# Patient Record
Sex: FEMALE | Race: WHITE | NOT HISPANIC OR LATINO | Employment: OTHER
[De-identification: names, ages, dates, MRNs, and addresses within clinical notes are randomized per-mention and may not be internally consistent; named-entity substitution may affect disease eponyms.]

---

## 2019-10-02 ENCOUNTER — HEALTH MAINTENANCE LETTER (OUTPATIENT)
Age: 69
End: 2019-10-02

## 2019-12-15 ENCOUNTER — HEALTH MAINTENANCE LETTER (OUTPATIENT)
Age: 69
End: 2019-12-15

## 2021-01-15 ENCOUNTER — HEALTH MAINTENANCE LETTER (OUTPATIENT)
Age: 71
End: 2021-01-15

## 2021-09-04 ENCOUNTER — HEALTH MAINTENANCE LETTER (OUTPATIENT)
Age: 71
End: 2021-09-04

## 2021-10-30 ENCOUNTER — HEALTH MAINTENANCE LETTER (OUTPATIENT)
Age: 71
End: 2021-10-30

## 2022-01-01 ENCOUNTER — HEALTH MAINTENANCE LETTER (OUTPATIENT)
Age: 72
End: 2022-01-01

## 2022-02-19 ENCOUNTER — HEALTH MAINTENANCE LETTER (OUTPATIENT)
Age: 72
End: 2022-02-19

## 2023-01-01 ENCOUNTER — APPOINTMENT (OUTPATIENT)
Dept: GENERAL RADIOLOGY | Facility: CLINIC | Age: 73
DRG: 870 | End: 2023-01-01
Attending: INTERNAL MEDICINE
Payer: MEDICARE

## 2023-01-01 ENCOUNTER — APPOINTMENT (OUTPATIENT)
Dept: CARDIOLOGY | Facility: CLINIC | Age: 73
DRG: 870 | End: 2023-01-01
Attending: INTERNAL MEDICINE
Payer: MEDICARE

## 2023-01-01 ENCOUNTER — HOSPITAL ENCOUNTER (EMERGENCY)
Facility: CLINIC | Age: 73
Discharge: HOME | End: 2023-05-17
Attending: EMERGENCY MEDICINE | Admitting: EMERGENCY MEDICINE
Payer: MEDICARE

## 2023-01-01 ENCOUNTER — ANESTHESIA (OUTPATIENT)
Dept: INTENSIVE CARE | Facility: CLINIC | Age: 73
DRG: 870 | End: 2023-01-01
Payer: MEDICARE

## 2023-01-01 ENCOUNTER — APPOINTMENT (OUTPATIENT)
Dept: CT IMAGING | Facility: CLINIC | Age: 73
DRG: 870 | End: 2023-01-01
Attending: INTERNAL MEDICINE
Payer: MEDICARE

## 2023-01-01 ENCOUNTER — HOSPITAL ENCOUNTER (EMERGENCY)
Facility: CLINIC | Age: 73
Discharge: HOME | DRG: 870 | End: 2023-05-21
Attending: EMERGENCY MEDICINE | Admitting: EMERGENCY MEDICINE
Payer: MEDICARE

## 2023-01-01 ENCOUNTER — APPOINTMENT (OUTPATIENT)
Dept: GENERAL RADIOLOGY | Facility: CLINIC | Age: 73
DRG: 870 | End: 2023-01-01
Attending: ANESTHESIOLOGY
Payer: MEDICARE

## 2023-01-01 ENCOUNTER — APPOINTMENT (OUTPATIENT)
Dept: CT IMAGING | Facility: CLINIC | Age: 73
DRG: 870 | End: 2023-01-01
Attending: EMERGENCY MEDICINE
Payer: MEDICARE

## 2023-01-01 ENCOUNTER — APPOINTMENT (OUTPATIENT)
Dept: GENERAL RADIOLOGY | Facility: CLINIC | Age: 73
DRG: 870 | End: 2023-01-01
Attending: EMERGENCY MEDICINE
Payer: MEDICARE

## 2023-01-01 ENCOUNTER — ANESTHESIA EVENT (OUTPATIENT)
Dept: INTENSIVE CARE | Facility: CLINIC | Age: 73
DRG: 870 | End: 2023-01-01
Payer: MEDICARE

## 2023-01-01 ENCOUNTER — HEALTH MAINTENANCE LETTER (OUTPATIENT)
Age: 73
End: 2023-01-01

## 2023-01-01 ENCOUNTER — APPOINTMENT (OUTPATIENT)
Dept: ULTRASOUND IMAGING | Facility: CLINIC | Age: 73
DRG: 870 | End: 2023-01-01
Attending: EMERGENCY MEDICINE
Payer: MEDICARE

## 2023-01-01 ENCOUNTER — HOSPITAL ENCOUNTER (INPATIENT)
Facility: CLINIC | Age: 73
LOS: 13 days | DRG: 870 | End: 2023-06-04
Attending: EMERGENCY MEDICINE | Admitting: INTERNAL MEDICINE
Payer: MEDICARE

## 2023-01-01 ENCOUNTER — APPOINTMENT (OUTPATIENT)
Dept: MRI IMAGING | Facility: CLINIC | Age: 73
DRG: 870 | End: 2023-01-01
Attending: INTERNAL MEDICINE
Payer: MEDICARE

## 2023-01-01 VITALS
BODY MASS INDEX: 30.02 KG/M2 | WEIGHT: 159 LBS | OXYGEN SATURATION: 100 % | TEMPERATURE: 99.6 F | HEIGHT: 61 IN | RESPIRATION RATE: 19 BRPM | DIASTOLIC BLOOD PRESSURE: 42 MMHG | HEART RATE: 77 BPM | SYSTOLIC BLOOD PRESSURE: 92 MMHG

## 2023-01-01 VITALS
OXYGEN SATURATION: 98 % | WEIGHT: 150 LBS | HEART RATE: 69 BPM | TEMPERATURE: 97.1 F | DIASTOLIC BLOOD PRESSURE: 52 MMHG | RESPIRATION RATE: 20 BRPM | SYSTOLIC BLOOD PRESSURE: 99 MMHG | BODY MASS INDEX: 27.89 KG/M2

## 2023-01-01 VITALS
TEMPERATURE: 94.9 F | WEIGHT: 171.08 LBS | DIASTOLIC BLOOD PRESSURE: 45 MMHG | BODY MASS INDEX: 32.32 KG/M2 | SYSTOLIC BLOOD PRESSURE: 102 MMHG | OXYGEN SATURATION: 89 %

## 2023-01-01 DIAGNOSIS — R78.81 GRAM-NEGATIVE BACTEREMIA: ICD-10-CM

## 2023-01-01 DIAGNOSIS — N17.9 ACUTE RENAL FAILURE SUPERIMPOSED ON CHRONIC KIDNEY DISEASE, UNSPECIFIED CKD STAGE, UNSPECIFIED ACUTE RENAL FAILURE TYPE: ICD-10-CM

## 2023-01-01 DIAGNOSIS — L29.9 ITCHING OF BOTH HANDS: ICD-10-CM

## 2023-01-01 DIAGNOSIS — R10.13 ABDOMINAL PAIN, EPIGASTRIC: ICD-10-CM

## 2023-01-01 DIAGNOSIS — M62.81 GENERALIZED MUSCLE WEAKNESS: ICD-10-CM

## 2023-01-01 DIAGNOSIS — D69.6 THROMBOCYTOPENIA (H): ICD-10-CM

## 2023-01-01 DIAGNOSIS — K74.3 PRIMARY BILIARY CIRRHOSIS (H): ICD-10-CM

## 2023-01-01 DIAGNOSIS — I95.9 HYPOTENSION, UNSPECIFIED HYPOTENSION TYPE: ICD-10-CM

## 2023-01-01 DIAGNOSIS — N18.9 ACUTE RENAL FAILURE SUPERIMPOSED ON CHRONIC KIDNEY DISEASE, UNSPECIFIED CKD STAGE, UNSPECIFIED ACUTE RENAL FAILURE TYPE: ICD-10-CM

## 2023-01-01 DIAGNOSIS — A41.9 SEPTIC SHOCK (H): ICD-10-CM

## 2023-01-01 DIAGNOSIS — R65.21 SEPTIC SHOCK (H): ICD-10-CM

## 2023-01-01 DIAGNOSIS — E16.2 HYPOGLYCEMIA: ICD-10-CM

## 2023-01-01 LAB
ABO/RH(D): NORMAL
ABO/RH(D): NORMAL
ABSOLUTE NEUTROPHILS, BODY FLUID: 1757 /UL
ABSOLUTE NEUTROPHILS, BODY FLUID: 96 /UL
ACINETOBACTER SPECIES: NOT DETECTED
ALBUMIN BODY FLUID SOURCE: NORMAL
ALBUMIN FLD-MCNC: 0.3 G/DL
ALBUMIN SERPL BCG-MCNC: 2.3 G/DL (ref 3.5–5.2)
ALBUMIN SERPL BCG-MCNC: 2.6 G/DL (ref 3.5–5.2)
ALBUMIN SERPL BCG-MCNC: 2.7 G/DL (ref 3.5–5.2)
ALBUMIN SERPL BCG-MCNC: 2.8 G/DL (ref 3.5–5.2)
ALBUMIN SERPL BCG-MCNC: 2.9 G/DL (ref 3.5–5.2)
ALBUMIN SERPL BCG-MCNC: 3 G/DL (ref 3.5–5.2)
ALBUMIN SERPL BCG-MCNC: 3 G/DL (ref 3.5–5.2)
ALBUMIN SERPL BCG-MCNC: 3.1 G/DL (ref 3.5–5.2)
ALBUMIN SERPL BCG-MCNC: 3.1 G/DL (ref 3.5–5.2)
ALBUMIN SERPL BCG-MCNC: 3.2 G/DL (ref 3.5–5.2)
ALBUMIN SERPL BCG-MCNC: 3.3 G/DL (ref 3.5–5.2)
ALBUMIN SERPL BCG-MCNC: 3.4 G/DL (ref 3.5–5.2)
ALBUMIN SERPL BCG-MCNC: 3.5 G/DL (ref 3.5–5.2)
ALBUMIN SERPL BCG-MCNC: 3.5 G/DL (ref 3.5–5.2)
ALBUMIN SERPL BCG-MCNC: 3.6 G/DL (ref 3.5–5.2)
ALBUMIN SERPL BCG-MCNC: 3.7 G/DL (ref 3.5–5.2)
ALBUMIN SERPL BCG-MCNC: 3.7 G/DL (ref 3.5–5.2)
ALBUMIN SERPL BCG-MCNC: 3.9 G/DL (ref 3.5–5.2)
ALBUMIN SERPL BCG-MCNC: 4 G/DL (ref 3.5–5.2)
ALBUMIN UR-MCNC: 30 MG/DL
ALLEN'S TEST: ABNORMAL
ALLEN'S TEST: YES
ALP SERPL-CCNC: 100 U/L (ref 35–104)
ALP SERPL-CCNC: 109 U/L (ref 35–104)
ALP SERPL-CCNC: 125 U/L (ref 35–104)
ALP SERPL-CCNC: 126 U/L (ref 35–104)
ALP SERPL-CCNC: 128 U/L (ref 35–104)
ALP SERPL-CCNC: 137 U/L (ref 35–104)
ALP SERPL-CCNC: 138 U/L (ref 35–104)
ALP SERPL-CCNC: 141 U/L (ref 35–104)
ALP SERPL-CCNC: 155 U/L (ref 35–104)
ALP SERPL-CCNC: 160 U/L (ref 35–104)
ALP SERPL-CCNC: 177 U/L (ref 35–104)
ALP SERPL-CCNC: 179 U/L (ref 35–104)
ALP SERPL-CCNC: 181 U/L (ref 35–104)
ALP SERPL-CCNC: 183 U/L (ref 35–104)
ALP SERPL-CCNC: 194 U/L (ref 35–104)
ALP SERPL-CCNC: 221 U/L (ref 35–104)
ALP SERPL-CCNC: 268 U/L (ref 35–104)
ALP SERPL-CCNC: 294 U/L (ref 35–104)
ALP SERPL-CCNC: 326 U/L (ref 35–104)
ALT SERPL W P-5'-P-CCNC: 30 U/L (ref 10–35)
ALT SERPL W P-5'-P-CCNC: 30 U/L (ref 10–35)
ALT SERPL W P-5'-P-CCNC: 31 U/L (ref 10–35)
ALT SERPL W P-5'-P-CCNC: 34 U/L (ref 10–35)
ALT SERPL W P-5'-P-CCNC: 36 U/L (ref 10–35)
ALT SERPL W P-5'-P-CCNC: 38 U/L (ref 10–35)
ALT SERPL W P-5'-P-CCNC: 39 U/L (ref 10–35)
ALT SERPL W P-5'-P-CCNC: 40 U/L (ref 10–35)
ALT SERPL W P-5'-P-CCNC: 43 U/L (ref 10–35)
ALT SERPL W P-5'-P-CCNC: 44 U/L (ref 10–35)
ALT SERPL W P-5'-P-CCNC: 44 U/L (ref 10–35)
ALT SERPL W P-5'-P-CCNC: 45 U/L (ref 10–35)
ALT SERPL W P-5'-P-CCNC: 46 U/L (ref 10–35)
ALT SERPL W P-5'-P-CCNC: 46 U/L (ref 10–35)
ALT SERPL W P-5'-P-CCNC: 48 U/L (ref 10–35)
ALT SERPL W P-5'-P-CCNC: 53 U/L (ref 10–35)
ALT SERPL W P-5'-P-CCNC: 74 U/L (ref 10–35)
ALT SERPL W P-5'-P-CCNC: 79 U/L (ref 10–35)
ALT SERPL W P-5'-P-CCNC: 81 U/L (ref 10–35)
AMMONIA PLAS-SCNC: 46 UMOL/L (ref 11–51)
ANION GAP SERPL CALCULATED.3IONS-SCNC: 10 MMOL/L (ref 7–15)
ANION GAP SERPL CALCULATED.3IONS-SCNC: 10 MMOL/L (ref 7–15)
ANION GAP SERPL CALCULATED.3IONS-SCNC: 11 MMOL/L (ref 7–15)
ANION GAP SERPL CALCULATED.3IONS-SCNC: 12 MMOL/L (ref 7–15)
ANION GAP SERPL CALCULATED.3IONS-SCNC: 13 MMOL/L (ref 7–15)
ANION GAP SERPL CALCULATED.3IONS-SCNC: 13 MMOL/L (ref 7–15)
ANION GAP SERPL CALCULATED.3IONS-SCNC: 14 MMOL/L (ref 7–15)
ANION GAP SERPL CALCULATED.3IONS-SCNC: 15 MMOL/L (ref 7–15)
ANION GAP SERPL CALCULATED.3IONS-SCNC: 15 MMOL/L (ref 7–15)
ANION GAP SERPL CALCULATED.3IONS-SCNC: 16 MMOL/L (ref 7–15)
ANION GAP SERPL CALCULATED.3IONS-SCNC: 17 MMOL/L (ref 7–15)
ANION GAP SERPL CALCULATED.3IONS-SCNC: 18 MMOL/L (ref 7–15)
ANION GAP SERPL CALCULATED.3IONS-SCNC: 20 MMOL/L (ref 7–15)
ANION GAP SERPL CALCULATED.3IONS-SCNC: 21 MMOL/L (ref 7–15)
ANION GAP SERPL CALCULATED.3IONS-SCNC: 22 MMOL/L (ref 7–15)
ANION GAP SERPL CALCULATED.3IONS-SCNC: 23 MMOL/L (ref 7–15)
ANION GAP SERPL CALCULATED.3IONS-SCNC: 24 MMOL/L (ref 7–15)
ANION GAP SERPL CALCULATED.3IONS-SCNC: 24 MMOL/L (ref 7–15)
ANION GAP SERPL CALCULATED.3IONS-SCNC: 25 MMOL/L (ref 7–15)
ANION GAP SERPL CALCULATED.3IONS-SCNC: 25 MMOL/L (ref 7–15)
ANION GAP SERPL CALCULATED.3IONS-SCNC: 26 MMOL/L (ref 7–15)
ANION GAP SERPL CALCULATED.3IONS-SCNC: 26 MMOL/L (ref 7–15)
ANION GAP SERPL CALCULATED.3IONS-SCNC: 27 MMOL/L (ref 7–15)
ANTIBODY SCREEN: NEGATIVE
ANTIBODY SCREEN: NEGATIVE
APPEARANCE FLD: ABNORMAL
APPEARANCE FLD: ABNORMAL
APPEARANCE UR: ABNORMAL
APTT PPP: 41 SECONDS (ref 22–38)
APTT PPP: 42 SECONDS (ref 22–38)
APTT PPP: 43 SECONDS (ref 22–38)
APTT PPP: 46 SECONDS (ref 22–38)
APTT PPP: 48 SECONDS (ref 22–38)
APTT PPP: 49 SECONDS (ref 22–38)
APTT PPP: 50 SECONDS (ref 22–38)
APTT PPP: 51 SECONDS (ref 22–38)
APTT PPP: 53 SECONDS (ref 22–38)
APTT PPP: 58 SECONDS (ref 22–38)
APTT PPP: 59 SECONDS (ref 22–38)
APTT PPP: 60 SECONDS (ref 22–38)
APTT PPP: 61 SECONDS (ref 22–38)
APTT PPP: 63 SECONDS (ref 22–38)
APTT PPP: 68 SECONDS (ref 22–38)
APTT PPP: 75 SECONDS (ref 22–38)
APTT PPP: 78 SECONDS (ref 22–38)
APTT PPP: 79 SECONDS (ref 22–38)
AST SERPL W P-5'-P-CCNC: 113 U/L (ref 10–35)
AST SERPL W P-5'-P-CCNC: 120 U/L (ref 10–35)
AST SERPL W P-5'-P-CCNC: 42 U/L (ref 10–35)
AST SERPL W P-5'-P-CCNC: 43 U/L (ref 10–35)
AST SERPL W P-5'-P-CCNC: 45 U/L (ref 10–35)
AST SERPL W P-5'-P-CCNC: 47 U/L (ref 10–35)
AST SERPL W P-5'-P-CCNC: 49 U/L (ref 10–35)
AST SERPL W P-5'-P-CCNC: 57 U/L (ref 10–35)
AST SERPL W P-5'-P-CCNC: 58 U/L (ref 10–35)
AST SERPL W P-5'-P-CCNC: 60 U/L (ref 10–35)
AST SERPL W P-5'-P-CCNC: 61 U/L (ref 10–35)
AST SERPL W P-5'-P-CCNC: 61 U/L (ref 10–35)
AST SERPL W P-5'-P-CCNC: 62 U/L (ref 10–35)
AST SERPL W P-5'-P-CCNC: 63 U/L (ref 10–35)
AST SERPL W P-5'-P-CCNC: 65 U/L (ref 10–35)
AST SERPL W P-5'-P-CCNC: 68 U/L (ref 10–35)
AST SERPL W P-5'-P-CCNC: 69 U/L (ref 10–35)
AST SERPL W P-5'-P-CCNC: 71 U/L (ref 10–35)
AST SERPL W P-5'-P-CCNC: 98 U/L (ref 10–35)
ATRIAL RATE - MUSE: 69 BPM
ATRIAL RATE - MUSE: 77 BPM
ATRIAL RATE - MUSE: 82 BPM
BACTERIA BLD CULT: ABNORMAL
BACTERIA BLD CULT: NO GROWTH
BACTERIA BRONCH: ABNORMAL
BACTERIA FLD CULT: ABNORMAL
BACTERIA FLD CULT: ABNORMAL
BASE EXCESS BLDA CALC-SCNC: -0.5 MMOL/L (ref -9–1.8)
BASE EXCESS BLDA CALC-SCNC: -10.6 MMOL/L (ref -9–1.8)
BASE EXCESS BLDA CALC-SCNC: -10.6 MMOL/L (ref -9–1.8)
BASE EXCESS BLDA CALC-SCNC: -10.7 MMOL/L (ref -9–1.8)
BASE EXCESS BLDA CALC-SCNC: -11.2 MMOL/L (ref -9–1.8)
BASE EXCESS BLDA CALC-SCNC: -12.9 MMOL/L (ref -9–1.8)
BASE EXCESS BLDA CALC-SCNC: -13.4 MMOL/L (ref -9–1.8)
BASE EXCESS BLDA CALC-SCNC: -14 MMOL/L (ref -9–1.8)
BASE EXCESS BLDA CALC-SCNC: -16.3 MMOL/L (ref -9–1.8)
BASE EXCESS BLDA CALC-SCNC: -18.5 MMOL/L (ref -9–1.8)
BASE EXCESS BLDA CALC-SCNC: -2 MMOL/L (ref -9–1.8)
BASE EXCESS BLDA CALC-SCNC: -2.3 MMOL/L (ref -9–1.8)
BASE EXCESS BLDA CALC-SCNC: -5.6 MMOL/L (ref -9–1.8)
BASE EXCESS BLDA CALC-SCNC: -8.1 MMOL/L (ref -9–1.8)
BASE EXCESS BLDA CALC-SCNC: -8.6 MMOL/L (ref -9–1.8)
BASE EXCESS BLDA CALC-SCNC: -8.8 MMOL/L (ref -9–1.8)
BASE EXCESS BLDA CALC-SCNC: -9.1 MMOL/L (ref -9–1.8)
BASE EXCESS BLDA CALC-SCNC: 0 MMOL/L (ref -9–1.8)
BASE EXCESS BLDA CALC-SCNC: 0.6 MMOL/L (ref -9–1.8)
BASE EXCESS BLDA CALC-SCNC: 0.7 MMOL/L (ref -9–1.8)
BASE EXCESS BLDA CALC-SCNC: 0.8 MMOL/L (ref -9–1.8)
BASE EXCESS BLDA CALC-SCNC: 1.5 MMOL/L (ref -9–1.8)
BASE EXCESS BLDA CALC-SCNC: 2.2 MMOL/L (ref -9–1.8)
BASE EXCESS BLDA CALC-SCNC: 3 MMOL/L (ref -9–1.8)
BASE EXCESS BLDA CALC-SCNC: 3 MMOL/L (ref -9–1.8)
BASE EXCESS BLDA CALC-SCNC: 3.6 MMOL/L (ref -9–1.8)
BASE EXCESS BLDA CALC-SCNC: 4.8 MMOL/L (ref -9–1.8)
BASE EXCESS BLDA CALC-SCNC: 6 MMOL/L (ref -9–1.8)
BASE EXCESS BLDA CALC-SCNC: 6.1 MMOL/L (ref -9–1.8)
BASE EXCESS BLDA CALC-SCNC: 6.3 MMOL/L (ref -9–1.8)
BASOPHILS # BLD AUTO: 0.1 10E3/UL (ref 0–0.2)
BASOPHILS # BLD AUTO: 0.1 10E3/UL (ref 0–0.2)
BASOPHILS # BLD MANUAL: 0 10E3/UL (ref 0–0.2)
BASOPHILS NFR BLD AUTO: 0 %
BASOPHILS NFR BLD AUTO: 0 %
BASOPHILS NFR BLD MANUAL: 0 %
BILIRUB DIRECT SERPL-MCNC: 2.93 MG/DL (ref 0–0.3)
BILIRUB DIRECT SERPL-MCNC: 2.94 MG/DL (ref 0–0.3)
BILIRUB DIRECT SERPL-MCNC: 3.3 MG/DL (ref 0–0.3)
BILIRUB DIRECT SERPL-MCNC: 3.35 MG/DL (ref 0–0.3)
BILIRUB DIRECT SERPL-MCNC: 3.76 MG/DL (ref 0–0.3)
BILIRUB DIRECT SERPL-MCNC: 3.93 MG/DL (ref 0–0.3)
BILIRUB DIRECT SERPL-MCNC: 5.29 MG/DL (ref 0–0.3)
BILIRUB DIRECT SERPL-MCNC: 6.89 MG/DL (ref 0–0.3)
BILIRUB SERPL-MCNC: 11.6 MG/DL
BILIRUB SERPL-MCNC: 11.9 MG/DL
BILIRUB SERPL-MCNC: 13.7 MG/DL
BILIRUB SERPL-MCNC: 13.9 MG/DL
BILIRUB SERPL-MCNC: 16.4 MG/DL
BILIRUB SERPL-MCNC: 16.4 MG/DL
BILIRUB SERPL-MCNC: 4.3 MG/DL
BILIRUB SERPL-MCNC: 4.6 MG/DL
BILIRUB SERPL-MCNC: 4.7 MG/DL
BILIRUB SERPL-MCNC: 5 MG/DL
BILIRUB SERPL-MCNC: 5.3 MG/DL
BILIRUB SERPL-MCNC: 5.3 MG/DL
BILIRUB SERPL-MCNC: 5.9 MG/DL
BILIRUB SERPL-MCNC: 6.2 MG/DL
BILIRUB SERPL-MCNC: 7 MG/DL
BILIRUB SERPL-MCNC: 7.7 MG/DL
BILIRUB SERPL-MCNC: 9.8 MG/DL
BILIRUB UR QL STRIP: ABNORMAL
BLD PROD TYP BPU: NORMAL
BLOOD COMPONENT TYPE: NORMAL
BUN SERPL-MCNC: 14.6 MG/DL (ref 8–23)
BUN SERPL-MCNC: 16.4 MG/DL (ref 8–23)
BUN SERPL-MCNC: 16.6 MG/DL (ref 8–23)
BUN SERPL-MCNC: 16.9 MG/DL (ref 8–23)
BUN SERPL-MCNC: 17 MG/DL (ref 8–23)
BUN SERPL-MCNC: 17.1 MG/DL (ref 8–23)
BUN SERPL-MCNC: 17.1 MG/DL (ref 8–23)
BUN SERPL-MCNC: 17.3 MG/DL (ref 8–23)
BUN SERPL-MCNC: 17.7 MG/DL (ref 8–23)
BUN SERPL-MCNC: 17.8 MG/DL (ref 8–23)
BUN SERPL-MCNC: 18 MG/DL (ref 8–23)
BUN SERPL-MCNC: 19 MG/DL (ref 8–23)
BUN SERPL-MCNC: 19.4 MG/DL (ref 8–23)
BUN SERPL-MCNC: 20.1 MG/DL (ref 8–23)
BUN SERPL-MCNC: 20.9 MG/DL (ref 8–23)
BUN SERPL-MCNC: 21 MG/DL (ref 8–23)
BUN SERPL-MCNC: 21.8 MG/DL (ref 8–23)
BUN SERPL-MCNC: 22.1 MG/DL (ref 8–23)
BUN SERPL-MCNC: 23.3 MG/DL (ref 8–23)
BUN SERPL-MCNC: 24.5 MG/DL (ref 8–23)
BUN SERPL-MCNC: 24.5 MG/DL (ref 8–23)
BUN SERPL-MCNC: 24.7 MG/DL (ref 8–23)
BUN SERPL-MCNC: 24.7 MG/DL (ref 8–23)
BUN SERPL-MCNC: 25.1 MG/DL (ref 8–23)
BUN SERPL-MCNC: 27.1 MG/DL (ref 8–23)
BUN SERPL-MCNC: 27.7 MG/DL (ref 8–23)
BUN SERPL-MCNC: 27.7 MG/DL (ref 8–23)
BUN SERPL-MCNC: 28.9 MG/DL (ref 8–23)
BUN SERPL-MCNC: 29 MG/DL (ref 8–23)
BUN SERPL-MCNC: 30.1 MG/DL (ref 8–23)
BUN SERPL-MCNC: 31.3 MG/DL (ref 8–23)
BUN SERPL-MCNC: 31.7 MG/DL (ref 8–23)
BUN SERPL-MCNC: 33.6 MG/DL (ref 8–23)
BUN SERPL-MCNC: 33.8 MG/DL (ref 8–23)
BUN SERPL-MCNC: 35.4 MG/DL (ref 8–23)
BUN SERPL-MCNC: 35.4 MG/DL (ref 8–23)
BUN SERPL-MCNC: 35.9 MG/DL (ref 8–23)
BUN SERPL-MCNC: 35.9 MG/DL (ref 8–23)
BUN SERPL-MCNC: 36.6 MG/DL (ref 8–23)
BUN SERPL-MCNC: 37.6 MG/DL (ref 8–23)
BUN SERPL-MCNC: 39 MG/DL (ref 8–23)
BUN SERPL-MCNC: 41.9 MG/DL (ref 8–23)
BUN SERPL-MCNC: 46.9 MG/DL (ref 8–23)
BUN SERPL-MCNC: 50.7 MG/DL (ref 8–23)
BUN SERPL-MCNC: 54.9 MG/DL (ref 8–23)
BUN SERPL-MCNC: 56.3 MG/DL (ref 8–23)
BUN SERPL-MCNC: 56.4 MG/DL (ref 8–23)
BUN SERPL-MCNC: 56.6 MG/DL (ref 8–23)
BURR CELLS BLD QL SMEAR: SLIGHT
C DIFF TOX B STL QL: NEGATIVE
CA-I BLD-MCNC: 4 MG/DL (ref 4.4–5.2)
CA-I BLD-MCNC: 4.1 MG/DL (ref 4.4–5.2)
CA-I BLD-MCNC: 4.2 MG/DL (ref 4.4–5.2)
CA-I BLD-MCNC: 4.3 MG/DL (ref 4.4–5.2)
CA-I BLD-MCNC: 4.4 MG/DL (ref 4.4–5.2)
CA-I BLD-MCNC: 4.5 MG/DL (ref 4.4–5.2)
CA-I BLD-MCNC: 4.6 MG/DL (ref 4.4–5.2)
CA-I BLD-MCNC: 4.7 MG/DL (ref 4.4–5.2)
CA-I BLD-MCNC: 4.8 MG/DL (ref 4.4–5.2)
CA-I BLD-MCNC: 5.1 MG/DL (ref 4.4–5.2)
CALCIUM SERPL-MCNC: 7.6 MG/DL (ref 8.8–10.2)
CALCIUM SERPL-MCNC: 7.7 MG/DL (ref 8.8–10.2)
CALCIUM SERPL-MCNC: 7.9 MG/DL (ref 8.8–10.2)
CALCIUM SERPL-MCNC: 8 MG/DL (ref 8.8–10.2)
CALCIUM SERPL-MCNC: 8.1 MG/DL (ref 8.8–10.2)
CALCIUM SERPL-MCNC: 8.1 MG/DL (ref 8.8–10.2)
CALCIUM SERPL-MCNC: 8.2 MG/DL (ref 8.8–10.2)
CALCIUM SERPL-MCNC: 8.3 MG/DL (ref 8.8–10.2)
CALCIUM SERPL-MCNC: 8.4 MG/DL (ref 8.8–10.2)
CALCIUM SERPL-MCNC: 8.6 MG/DL (ref 8.8–10.2)
CALCIUM SERPL-MCNC: 8.7 MG/DL (ref 8.8–10.2)
CALCIUM SERPL-MCNC: 8.7 MG/DL (ref 8.8–10.2)
CALCIUM SERPL-MCNC: 8.8 MG/DL (ref 8.8–10.2)
CALCIUM SERPL-MCNC: 8.8 MG/DL (ref 8.8–10.2)
CALCIUM SERPL-MCNC: 8.9 MG/DL (ref 8.8–10.2)
CALCIUM SERPL-MCNC: 8.9 MG/DL (ref 8.8–10.2)
CALCIUM SERPL-MCNC: 9 MG/DL (ref 8.8–10.2)
CALCIUM SERPL-MCNC: 9 MG/DL (ref 8.8–10.2)
CALCIUM SERPL-MCNC: 9.2 MG/DL (ref 8.8–10.2)
CALCIUM SERPL-MCNC: 9.2 MG/DL (ref 8.8–10.2)
CALCIUM SERPL-MCNC: 9.3 MG/DL (ref 8.8–10.2)
CALCIUM SERPL-MCNC: 9.3 MG/DL (ref 8.8–10.2)
CALCIUM SERPL-MCNC: 9.4 MG/DL (ref 8.8–10.2)
CALCIUM SERPL-MCNC: 9.4 MG/DL (ref 8.8–10.2)
CALCIUM SERPL-MCNC: 9.5 MG/DL (ref 8.8–10.2)
CALCIUM SERPL-MCNC: 9.5 MG/DL (ref 8.8–10.2)
CALCIUM SERPL-MCNC: 9.7 MG/DL (ref 8.8–10.2)
CELL COUNT BODY FLUID SOURCE: ABNORMAL
CELL COUNT BODY FLUID SOURCE: ABNORMAL
CHLORIDE SERPL-SCNC: 100 MMOL/L (ref 98–107)
CHLORIDE SERPL-SCNC: 101 MMOL/L (ref 98–107)
CHLORIDE SERPL-SCNC: 102 MMOL/L (ref 98–107)
CHLORIDE SERPL-SCNC: 103 MMOL/L (ref 98–107)
CHLORIDE SERPL-SCNC: 104 MMOL/L (ref 98–107)
CHLORIDE SERPL-SCNC: 105 MMOL/L (ref 98–107)
CHLORIDE SERPL-SCNC: 105 MMOL/L (ref 98–107)
CHLORIDE SERPL-SCNC: 93 MMOL/L (ref 98–107)
CHLORIDE SERPL-SCNC: 94 MMOL/L (ref 98–107)
CHLORIDE SERPL-SCNC: 94 MMOL/L (ref 98–107)
CHLORIDE SERPL-SCNC: 95 MMOL/L (ref 98–107)
CHLORIDE SERPL-SCNC: 95 MMOL/L (ref 98–107)
CHLORIDE SERPL-SCNC: 99 MMOL/L (ref 98–107)
CITROBACTER SPECIES: NOT DETECTED
CMV DNA SPEC NAA+PROBE-ACNC: NOT DETECTED IU/ML
CODING SYSTEM: NORMAL
COLOR FLD: ABNORMAL
COLOR FLD: YELLOW
COLOR UR AUTO: ABNORMAL
CORTIS SERPL-MCNC: 68.7 UG/DL
CREAT SERPL-MCNC: 0.54 MG/DL (ref 0.51–0.95)
CREAT SERPL-MCNC: 0.6 MG/DL (ref 0.51–0.95)
CREAT SERPL-MCNC: 0.63 MG/DL (ref 0.51–0.95)
CREAT SERPL-MCNC: 0.66 MG/DL (ref 0.51–0.95)
CREAT SERPL-MCNC: 0.75 MG/DL (ref 0.51–0.95)
CREAT SERPL-MCNC: 0.75 MG/DL (ref 0.51–0.95)
CREAT SERPL-MCNC: 0.87 MG/DL (ref 0.51–0.95)
CREAT SERPL-MCNC: 0.88 MG/DL (ref 0.51–0.95)
CREAT SERPL-MCNC: 0.89 MG/DL (ref 0.51–0.95)
CREAT SERPL-MCNC: 0.9 MG/DL (ref 0.51–0.95)
CREAT SERPL-MCNC: 0.91 MG/DL (ref 0.51–0.95)
CREAT SERPL-MCNC: 0.92 MG/DL (ref 0.51–0.95)
CREAT SERPL-MCNC: 0.93 MG/DL (ref 0.51–0.95)
CREAT SERPL-MCNC: 0.94 MG/DL (ref 0.51–0.95)
CREAT SERPL-MCNC: 0.95 MG/DL (ref 0.51–0.95)
CREAT SERPL-MCNC: 0.96 MG/DL (ref 0.51–0.95)
CREAT SERPL-MCNC: 0.97 MG/DL (ref 0.51–0.95)
CREAT SERPL-MCNC: 1.02 MG/DL (ref 0.51–0.95)
CREAT SERPL-MCNC: 1.06 MG/DL (ref 0.51–0.95)
CREAT SERPL-MCNC: 1.1 MG/DL (ref 0.51–0.95)
CREAT SERPL-MCNC: 1.12 MG/DL (ref 0.51–0.95)
CREAT SERPL-MCNC: 1.13 MG/DL (ref 0.51–0.95)
CREAT SERPL-MCNC: 1.21 MG/DL (ref 0.51–0.95)
CREAT SERPL-MCNC: 1.32 MG/DL (ref 0.51–0.95)
CREAT SERPL-MCNC: 1.46 MG/DL (ref 0.51–0.95)
CREAT SERPL-MCNC: 1.53 MG/DL (ref 0.51–0.95)
CREAT SERPL-MCNC: 1.68 MG/DL (ref 0.51–0.95)
CREAT SERPL-MCNC: 1.74 MG/DL (ref 0.51–0.95)
CREAT SERPL-MCNC: 2.03 MG/DL (ref 0.51–0.95)
CREAT SERPL-MCNC: 2.21 MG/DL (ref 0.51–0.95)
CREAT SERPL-MCNC: 2.42 MG/DL (ref 0.51–0.95)
CREAT SERPL-MCNC: 2.5 MG/DL (ref 0.51–0.95)
CREAT SERPL-MCNC: 2.58 MG/DL (ref 0.51–0.95)
CREAT SERPL-MCNC: 2.82 MG/DL (ref 0.51–0.95)
CREAT SERPL-MCNC: 3.04 MG/DL (ref 0.51–0.95)
CROSSMATCH: NORMAL
CTX-M: NOT DETECTED
DAT POLY: NORMAL
DEPRECATED HCO3 PLAS-SCNC: 11 MMOL/L (ref 22–29)
DEPRECATED HCO3 PLAS-SCNC: 12 MMOL/L (ref 22–29)
DEPRECATED HCO3 PLAS-SCNC: 12 MMOL/L (ref 22–29)
DEPRECATED HCO3 PLAS-SCNC: 13 MMOL/L (ref 22–29)
DEPRECATED HCO3 PLAS-SCNC: 14 MMOL/L (ref 22–29)
DEPRECATED HCO3 PLAS-SCNC: 14 MMOL/L (ref 22–29)
DEPRECATED HCO3 PLAS-SCNC: 15 MMOL/L (ref 22–29)
DEPRECATED HCO3 PLAS-SCNC: 16 MMOL/L (ref 22–29)
DEPRECATED HCO3 PLAS-SCNC: 16 MMOL/L (ref 22–29)
DEPRECATED HCO3 PLAS-SCNC: 17 MMOL/L (ref 22–29)
DEPRECATED HCO3 PLAS-SCNC: 17 MMOL/L (ref 22–29)
DEPRECATED HCO3 PLAS-SCNC: 18 MMOL/L (ref 22–29)
DEPRECATED HCO3 PLAS-SCNC: 19 MMOL/L (ref 22–29)
DEPRECATED HCO3 PLAS-SCNC: 20 MMOL/L (ref 22–29)
DEPRECATED HCO3 PLAS-SCNC: 21 MMOL/L (ref 22–29)
DEPRECATED HCO3 PLAS-SCNC: 22 MMOL/L (ref 22–29)
DEPRECATED HCO3 PLAS-SCNC: 23 MMOL/L (ref 22–29)
DEPRECATED HCO3 PLAS-SCNC: 24 MMOL/L (ref 22–29)
DEPRECATED HCO3 PLAS-SCNC: 24 MMOL/L (ref 22–29)
DEPRECATED HCO3 PLAS-SCNC: 25 MMOL/L (ref 22–29)
DEPRECATED HCO3 PLAS-SCNC: 26 MMOL/L (ref 22–29)
DEPRECATED HCO3 PLAS-SCNC: 27 MMOL/L (ref 22–29)
DEPRECATED HCO3 PLAS-SCNC: 27 MMOL/L (ref 22–29)
DIASTOLIC BLOOD PRESSURE - MUSE: NORMAL MMHG
ENTEROBACTER SPECIES: NOT DETECTED
EOSINOPHIL # BLD AUTO: 0 10E3/UL (ref 0–0.7)
EOSINOPHIL # BLD AUTO: 0.2 10E3/UL (ref 0–0.7)
EOSINOPHIL # BLD MANUAL: 0 10E3/UL (ref 0–0.7)
EOSINOPHIL # BLD MANUAL: 0.2 10E3/UL (ref 0–0.7)
EOSINOPHIL NFR BLD AUTO: 0 %
EOSINOPHIL NFR BLD AUTO: 1 %
EOSINOPHIL NFR BLD MANUAL: 0 %
EOSINOPHIL NFR BLD MANUAL: 1 %
ERYTHROCYTE [DISTWIDTH] IN BLOOD BY AUTOMATED COUNT: 17.1 % (ref 10–15)
ERYTHROCYTE [DISTWIDTH] IN BLOOD BY AUTOMATED COUNT: 17.6 % (ref 10–15)
ERYTHROCYTE [DISTWIDTH] IN BLOOD BY AUTOMATED COUNT: 17.7 % (ref 10–15)
ERYTHROCYTE [DISTWIDTH] IN BLOOD BY AUTOMATED COUNT: 17.9 % (ref 10–15)
ERYTHROCYTE [DISTWIDTH] IN BLOOD BY AUTOMATED COUNT: 17.9 % (ref 10–15)
ERYTHROCYTE [DISTWIDTH] IN BLOOD BY AUTOMATED COUNT: 18 % (ref 10–15)
ERYTHROCYTE [DISTWIDTH] IN BLOOD BY AUTOMATED COUNT: 18.1 % (ref 10–15)
ERYTHROCYTE [DISTWIDTH] IN BLOOD BY AUTOMATED COUNT: 18.1 % (ref 10–15)
ERYTHROCYTE [DISTWIDTH] IN BLOOD BY AUTOMATED COUNT: 18.3 % (ref 10–15)
ERYTHROCYTE [DISTWIDTH] IN BLOOD BY AUTOMATED COUNT: 18.3 % (ref 10–15)
ERYTHROCYTE [DISTWIDTH] IN BLOOD BY AUTOMATED COUNT: 18.4 % (ref 10–15)
ERYTHROCYTE [DISTWIDTH] IN BLOOD BY AUTOMATED COUNT: 18.5 % (ref 10–15)
ERYTHROCYTE [DISTWIDTH] IN BLOOD BY AUTOMATED COUNT: 18.8 % (ref 10–15)
ERYTHROCYTE [DISTWIDTH] IN BLOOD BY AUTOMATED COUNT: 18.9 % (ref 10–15)
ERYTHROCYTE [DISTWIDTH] IN BLOOD BY AUTOMATED COUNT: 19 % (ref 10–15)
ERYTHROCYTE [DISTWIDTH] IN BLOOD BY AUTOMATED COUNT: 19 % (ref 10–15)
ERYTHROCYTE [DISTWIDTH] IN BLOOD BY AUTOMATED COUNT: 19.1 % (ref 10–15)
ERYTHROCYTE [DISTWIDTH] IN BLOOD BY AUTOMATED COUNT: 19.1 % (ref 10–15)
ERYTHROCYTE [DISTWIDTH] IN BLOOD BY AUTOMATED COUNT: 19.2 % (ref 10–15)
ERYTHROCYTE [DISTWIDTH] IN BLOOD BY AUTOMATED COUNT: 19.3 % (ref 10–15)
ERYTHROCYTE [DISTWIDTH] IN BLOOD BY AUTOMATED COUNT: 19.4 % (ref 10–15)
ERYTHROCYTE [DISTWIDTH] IN BLOOD BY AUTOMATED COUNT: 19.4 % (ref 10–15)
ERYTHROCYTE [DISTWIDTH] IN BLOOD BY AUTOMATED COUNT: 19.5 % (ref 10–15)
ERYTHROCYTE [DISTWIDTH] IN BLOOD BY AUTOMATED COUNT: 20 % (ref 10–15)
ERYTHROCYTE [DISTWIDTH] IN BLOOD BY AUTOMATED COUNT: 20.7 % (ref 10–15)
ERYTHROCYTE [DISTWIDTH] IN BLOOD BY AUTOMATED COUNT: 20.8 % (ref 10–15)
ERYTHROCYTE [DISTWIDTH] IN BLOOD BY AUTOMATED COUNT: 21.5 % (ref 10–15)
ERYTHROCYTE [DISTWIDTH] IN BLOOD BY AUTOMATED COUNT: 22.2 % (ref 10–15)
ERYTHROCYTE [DISTWIDTH] IN BLOOD BY AUTOMATED COUNT: 22.5 % (ref 10–15)
ERYTHROCYTE [DISTWIDTH] IN BLOOD BY AUTOMATED COUNT: 23.2 % (ref 10–15)
ERYTHROCYTE [DISTWIDTH] IN BLOOD BY AUTOMATED COUNT: 23.8 % (ref 10–15)
ERYTHROCYTE [DISTWIDTH] IN BLOOD BY AUTOMATED COUNT: 24.3 % (ref 10–15)
ERYTHROCYTE [DISTWIDTH] IN BLOOD BY AUTOMATED COUNT: 25 % (ref 10–15)
ERYTHROCYTE [DISTWIDTH] IN BLOOD BY AUTOMATED COUNT: 25.3 % (ref 10–15)
ERYTHROCYTE [DISTWIDTH] IN BLOOD BY AUTOMATED COUNT: 25.4 % (ref 10–15)
ERYTHROCYTE [DISTWIDTH] IN BLOOD BY AUTOMATED COUNT: 26.6 % (ref 10–15)
ERYTHROCYTE [DISTWIDTH] IN BLOOD BY AUTOMATED COUNT: ABNORMAL %
ESCHERICHIA COLI: DETECTED
FIBRINOGEN PPP-MCNC: 101 MG/DL (ref 170–490)
FIBRINOGEN PPP-MCNC: 106 MG/DL (ref 170–490)
FIBRINOGEN PPP-MCNC: 108 MG/DL (ref 170–490)
FIBRINOGEN PPP-MCNC: 108 MG/DL (ref 170–490)
FIBRINOGEN PPP-MCNC: 110 MG/DL (ref 170–490)
FIBRINOGEN PPP-MCNC: 112 MG/DL (ref 170–490)
FIBRINOGEN PPP-MCNC: 118 MG/DL (ref 170–490)
FIBRINOGEN PPP-MCNC: 127 MG/DL (ref 170–490)
FIBRINOGEN PPP-MCNC: 130 MG/DL (ref 170–490)
FIBRINOGEN PPP-MCNC: 134 MG/DL (ref 170–490)
FIBRINOGEN PPP-MCNC: 137 MG/DL (ref 170–490)
FIBRINOGEN PPP-MCNC: 138 MG/DL (ref 170–490)
FIBRINOGEN PPP-MCNC: 146 MG/DL (ref 170–490)
FIBRINOGEN PPP-MCNC: 147 MG/DL (ref 170–490)
FIBRINOGEN PPP-MCNC: 147 MG/DL (ref 170–490)
FIBRINOGEN PPP-MCNC: 149 MG/DL (ref 170–490)
FIBRINOGEN PPP-MCNC: 71 MG/DL (ref 170–490)
FIBRINOGEN PPP-MCNC: 72 MG/DL (ref 170–490)
FIBRINOGEN PPP-MCNC: 85 MG/DL (ref 170–490)
FIBRINOGEN PPP-MCNC: 88 MG/DL (ref 170–490)
FIBRINOGEN PPP-MCNC: 93 MG/DL (ref 170–490)
FIBRINOGEN PPP-MCNC: 93 MG/DL (ref 170–490)
FIBRINOGEN PPP-MCNC: 94 MG/DL (ref 170–490)
FIBRINOGEN PPP-MCNC: 98 MG/DL (ref 170–490)
FIBRINOGEN PPP-MCNC: 98 MG/DL (ref 170–490)
FIBRINOGEN PPP-MCNC: <61 MG/DL (ref 170–490)
FLUAV RNA SPEC QL NAA+PROBE: NEGATIVE
FLUBV RNA RESP QL NAA+PROBE: NEGATIVE
GFR SERPL CREATININE-BSD FRML MDRD: 15 ML/MIN/1.73M2
GFR SERPL CREATININE-BSD FRML MDRD: 17 ML/MIN/1.73M2
GFR SERPL CREATININE-BSD FRML MDRD: 19 ML/MIN/1.73M2
GFR SERPL CREATININE-BSD FRML MDRD: 19 ML/MIN/1.73M2
GFR SERPL CREATININE-BSD FRML MDRD: 20 ML/MIN/1.73M2
GFR SERPL CREATININE-BSD FRML MDRD: 23 ML/MIN/1.73M2
GFR SERPL CREATININE-BSD FRML MDRD: 25 ML/MIN/1.73M2
GFR SERPL CREATININE-BSD FRML MDRD: 30 ML/MIN/1.73M2
GFR SERPL CREATININE-BSD FRML MDRD: 31 ML/MIN/1.73M2
GFR SERPL CREATININE-BSD FRML MDRD: 35 ML/MIN/1.73M2
GFR SERPL CREATININE-BSD FRML MDRD: 37 ML/MIN/1.73M2
GFR SERPL CREATININE-BSD FRML MDRD: 42 ML/MIN/1.73M2
GFR SERPL CREATININE-BSD FRML MDRD: 47 ML/MIN/1.73M2
GFR SERPL CREATININE-BSD FRML MDRD: 50 ML/MIN/1.73M2
GFR SERPL CREATININE-BSD FRML MDRD: 51 ML/MIN/1.73M2
GFR SERPL CREATININE-BSD FRML MDRD: 52 ML/MIN/1.73M2
GFR SERPL CREATININE-BSD FRML MDRD: 55 ML/MIN/1.73M2
GFR SERPL CREATININE-BSD FRML MDRD: 57 ML/MIN/1.73M2
GFR SERPL CREATININE-BSD FRML MDRD: 61 ML/MIN/1.73M2
GFR SERPL CREATININE-BSD FRML MDRD: 61 ML/MIN/1.73M2
GFR SERPL CREATININE-BSD FRML MDRD: 62 ML/MIN/1.73M2
GFR SERPL CREATININE-BSD FRML MDRD: 63 ML/MIN/1.73M2
GFR SERPL CREATININE-BSD FRML MDRD: 64 ML/MIN/1.73M2
GFR SERPL CREATININE-BSD FRML MDRD: 65 ML/MIN/1.73M2
GFR SERPL CREATININE-BSD FRML MDRD: 66 ML/MIN/1.73M2
GFR SERPL CREATININE-BSD FRML MDRD: 67 ML/MIN/1.73M2
GFR SERPL CREATININE-BSD FRML MDRD: 68 ML/MIN/1.73M2
GFR SERPL CREATININE-BSD FRML MDRD: 69 ML/MIN/1.73M2
GFR SERPL CREATININE-BSD FRML MDRD: 83 ML/MIN/1.73M2
GFR SERPL CREATININE-BSD FRML MDRD: 83 ML/MIN/1.73M2
GFR SERPL CREATININE-BSD FRML MDRD: >90 ML/MIN/1.73M2
GLUCOSE BLDC GLUCOMTR-MCNC: 105 MG/DL (ref 70–99)
GLUCOSE BLDC GLUCOMTR-MCNC: 106 MG/DL (ref 70–99)
GLUCOSE BLDC GLUCOMTR-MCNC: 106 MG/DL (ref 70–99)
GLUCOSE BLDC GLUCOMTR-MCNC: 107 MG/DL (ref 70–99)
GLUCOSE BLDC GLUCOMTR-MCNC: 109 MG/DL (ref 70–99)
GLUCOSE BLDC GLUCOMTR-MCNC: 110 MG/DL (ref 70–99)
GLUCOSE BLDC GLUCOMTR-MCNC: 111 MG/DL (ref 70–99)
GLUCOSE BLDC GLUCOMTR-MCNC: 112 MG/DL (ref 70–99)
GLUCOSE BLDC GLUCOMTR-MCNC: 112 MG/DL (ref 70–99)
GLUCOSE BLDC GLUCOMTR-MCNC: 113 MG/DL (ref 70–99)
GLUCOSE BLDC GLUCOMTR-MCNC: 113 MG/DL (ref 70–99)
GLUCOSE BLDC GLUCOMTR-MCNC: 116 MG/DL (ref 70–99)
GLUCOSE BLDC GLUCOMTR-MCNC: 117 MG/DL (ref 70–99)
GLUCOSE BLDC GLUCOMTR-MCNC: 117 MG/DL (ref 70–99)
GLUCOSE BLDC GLUCOMTR-MCNC: 118 MG/DL (ref 70–99)
GLUCOSE BLDC GLUCOMTR-MCNC: 119 MG/DL (ref 70–99)
GLUCOSE BLDC GLUCOMTR-MCNC: 120 MG/DL (ref 70–99)
GLUCOSE BLDC GLUCOMTR-MCNC: 120 MG/DL (ref 70–99)
GLUCOSE BLDC GLUCOMTR-MCNC: 125 MG/DL (ref 70–99)
GLUCOSE BLDC GLUCOMTR-MCNC: 127 MG/DL (ref 70–99)
GLUCOSE BLDC GLUCOMTR-MCNC: 128 MG/DL (ref 70–99)
GLUCOSE BLDC GLUCOMTR-MCNC: 130 MG/DL (ref 70–99)
GLUCOSE BLDC GLUCOMTR-MCNC: 132 MG/DL (ref 70–99)
GLUCOSE BLDC GLUCOMTR-MCNC: 132 MG/DL (ref 70–99)
GLUCOSE BLDC GLUCOMTR-MCNC: 139 MG/DL (ref 70–99)
GLUCOSE BLDC GLUCOMTR-MCNC: 142 MG/DL (ref 70–99)
GLUCOSE BLDC GLUCOMTR-MCNC: 143 MG/DL (ref 70–99)
GLUCOSE BLDC GLUCOMTR-MCNC: 143 MG/DL (ref 70–99)
GLUCOSE BLDC GLUCOMTR-MCNC: 144 MG/DL (ref 70–99)
GLUCOSE BLDC GLUCOMTR-MCNC: 145 MG/DL (ref 70–99)
GLUCOSE BLDC GLUCOMTR-MCNC: 146 MG/DL (ref 70–99)
GLUCOSE BLDC GLUCOMTR-MCNC: 146 MG/DL (ref 70–99)
GLUCOSE BLDC GLUCOMTR-MCNC: 148 MG/DL (ref 70–99)
GLUCOSE BLDC GLUCOMTR-MCNC: 149 MG/DL (ref 70–99)
GLUCOSE BLDC GLUCOMTR-MCNC: 151 MG/DL (ref 70–99)
GLUCOSE BLDC GLUCOMTR-MCNC: 151 MG/DL (ref 70–99)
GLUCOSE BLDC GLUCOMTR-MCNC: 154 MG/DL (ref 70–99)
GLUCOSE BLDC GLUCOMTR-MCNC: 155 MG/DL (ref 70–99)
GLUCOSE BLDC GLUCOMTR-MCNC: 155 MG/DL (ref 70–99)
GLUCOSE BLDC GLUCOMTR-MCNC: 157 MG/DL (ref 70–99)
GLUCOSE BLDC GLUCOMTR-MCNC: 159 MG/DL (ref 70–99)
GLUCOSE BLDC GLUCOMTR-MCNC: 160 MG/DL (ref 70–99)
GLUCOSE BLDC GLUCOMTR-MCNC: 161 MG/DL (ref 70–99)
GLUCOSE BLDC GLUCOMTR-MCNC: 161 MG/DL (ref 70–99)
GLUCOSE BLDC GLUCOMTR-MCNC: 162 MG/DL (ref 70–99)
GLUCOSE BLDC GLUCOMTR-MCNC: 163 MG/DL (ref 70–99)
GLUCOSE BLDC GLUCOMTR-MCNC: 164 MG/DL (ref 70–99)
GLUCOSE BLDC GLUCOMTR-MCNC: 168 MG/DL (ref 70–99)
GLUCOSE BLDC GLUCOMTR-MCNC: 169 MG/DL (ref 70–99)
GLUCOSE BLDC GLUCOMTR-MCNC: 172 MG/DL (ref 70–99)
GLUCOSE BLDC GLUCOMTR-MCNC: 173 MG/DL (ref 70–99)
GLUCOSE BLDC GLUCOMTR-MCNC: 173 MG/DL (ref 70–99)
GLUCOSE BLDC GLUCOMTR-MCNC: 182 MG/DL (ref 70–99)
GLUCOSE BLDC GLUCOMTR-MCNC: 20 MG/DL (ref 70–99)
GLUCOSE BLDC GLUCOMTR-MCNC: 46 MG/DL (ref 70–99)
GLUCOSE BLDC GLUCOMTR-MCNC: 57 MG/DL (ref 70–99)
GLUCOSE BLDC GLUCOMTR-MCNC: 62 MG/DL (ref 70–99)
GLUCOSE BLDC GLUCOMTR-MCNC: 64 MG/DL (ref 70–99)
GLUCOSE BLDC GLUCOMTR-MCNC: 67 MG/DL (ref 70–99)
GLUCOSE BLDC GLUCOMTR-MCNC: 74 MG/DL (ref 70–99)
GLUCOSE BLDC GLUCOMTR-MCNC: 79 MG/DL (ref 70–99)
GLUCOSE BLDC GLUCOMTR-MCNC: 80 MG/DL (ref 70–99)
GLUCOSE BLDC GLUCOMTR-MCNC: 81 MG/DL (ref 70–99)
GLUCOSE BLDC GLUCOMTR-MCNC: 82 MG/DL (ref 70–99)
GLUCOSE BLDC GLUCOMTR-MCNC: 83 MG/DL (ref 70–99)
GLUCOSE BLDC GLUCOMTR-MCNC: 83 MG/DL (ref 70–99)
GLUCOSE BLDC GLUCOMTR-MCNC: 84 MG/DL (ref 70–99)
GLUCOSE BLDC GLUCOMTR-MCNC: 88 MG/DL (ref 70–99)
GLUCOSE BLDC GLUCOMTR-MCNC: 90 MG/DL (ref 70–99)
GLUCOSE BLDC GLUCOMTR-MCNC: 90 MG/DL (ref 70–99)
GLUCOSE BLDC GLUCOMTR-MCNC: 92 MG/DL (ref 70–99)
GLUCOSE BLDC GLUCOMTR-MCNC: 93 MG/DL (ref 70–99)
GLUCOSE BLDC GLUCOMTR-MCNC: 94 MG/DL (ref 70–99)
GLUCOSE BLDC GLUCOMTR-MCNC: 95 MG/DL (ref 70–99)
GLUCOSE BLDC GLUCOMTR-MCNC: 96 MG/DL (ref 70–99)
GLUCOSE BLDC GLUCOMTR-MCNC: 96 MG/DL (ref 70–99)
GLUCOSE BLDC GLUCOMTR-MCNC: 97 MG/DL (ref 70–99)
GLUCOSE BLDC GLUCOMTR-MCNC: 97 MG/DL (ref 70–99)
GLUCOSE BLDC GLUCOMTR-MCNC: 98 MG/DL (ref 70–99)
GLUCOSE BLDC GLUCOMTR-MCNC: 99 MG/DL (ref 70–99)
GLUCOSE SERPL-MCNC: 103 MG/DL (ref 70–99)
GLUCOSE SERPL-MCNC: 104 MG/DL (ref 70–99)
GLUCOSE SERPL-MCNC: 106 MG/DL (ref 70–99)
GLUCOSE SERPL-MCNC: 108 MG/DL (ref 70–99)
GLUCOSE SERPL-MCNC: 109 MG/DL (ref 70–99)
GLUCOSE SERPL-MCNC: 109 MG/DL (ref 70–99)
GLUCOSE SERPL-MCNC: 112 MG/DL (ref 70–99)
GLUCOSE SERPL-MCNC: 116 MG/DL (ref 70–99)
GLUCOSE SERPL-MCNC: 117 MG/DL (ref 70–99)
GLUCOSE SERPL-MCNC: 120 MG/DL (ref 70–99)
GLUCOSE SERPL-MCNC: 123 MG/DL (ref 70–99)
GLUCOSE SERPL-MCNC: 126 MG/DL (ref 70–99)
GLUCOSE SERPL-MCNC: 128 MG/DL (ref 70–99)
GLUCOSE SERPL-MCNC: 129 MG/DL (ref 70–99)
GLUCOSE SERPL-MCNC: 133 MG/DL (ref 70–99)
GLUCOSE SERPL-MCNC: 136 MG/DL (ref 70–99)
GLUCOSE SERPL-MCNC: 139 MG/DL (ref 70–99)
GLUCOSE SERPL-MCNC: 149 MG/DL (ref 70–99)
GLUCOSE SERPL-MCNC: 159 MG/DL (ref 70–99)
GLUCOSE SERPL-MCNC: 162 MG/DL (ref 70–99)
GLUCOSE SERPL-MCNC: 163 MG/DL (ref 70–99)
GLUCOSE SERPL-MCNC: 164 MG/DL (ref 70–99)
GLUCOSE SERPL-MCNC: 168 MG/DL (ref 70–99)
GLUCOSE SERPL-MCNC: 168 MG/DL (ref 70–99)
GLUCOSE SERPL-MCNC: 169 MG/DL (ref 70–99)
GLUCOSE SERPL-MCNC: 172 MG/DL (ref 70–99)
GLUCOSE SERPL-MCNC: 173 MG/DL (ref 70–99)
GLUCOSE SERPL-MCNC: 176 MG/DL (ref 70–99)
GLUCOSE SERPL-MCNC: 176 MG/DL (ref 70–99)
GLUCOSE SERPL-MCNC: 177 MG/DL (ref 70–99)
GLUCOSE SERPL-MCNC: 181 MG/DL (ref 70–99)
GLUCOSE SERPL-MCNC: 188 MG/DL (ref 70–99)
GLUCOSE SERPL-MCNC: 188 MG/DL (ref 70–99)
GLUCOSE SERPL-MCNC: 192 MG/DL (ref 70–99)
GLUCOSE SERPL-MCNC: 40 MG/DL (ref 70–99)
GLUCOSE SERPL-MCNC: 47 MG/DL (ref 70–99)
GLUCOSE SERPL-MCNC: 79 MG/DL (ref 70–99)
GLUCOSE SERPL-MCNC: 87 MG/DL (ref 70–99)
GLUCOSE SERPL-MCNC: 89 MG/DL (ref 70–99)
GLUCOSE SERPL-MCNC: 89 MG/DL (ref 70–99)
GLUCOSE SERPL-MCNC: 93 MG/DL (ref 70–99)
GLUCOSE SERPL-MCNC: 94 MG/DL (ref 70–99)
GLUCOSE SERPL-MCNC: 97 MG/DL (ref 70–99)
GLUCOSE UR STRIP-MCNC: NEGATIVE MG/DL
GRAM STAIN RESULT: NORMAL
GRANULAR CAST: 10 /LPF
HAPTOGLOB SERPL-MCNC: 4 MG/DL (ref 32–197)
HCO3 BLD-SCNC: 12 MMOL/L (ref 21–28)
HCO3 BLD-SCNC: 13 MMOL/L (ref 21–28)
HCO3 BLD-SCNC: 13 MMOL/L (ref 21–28)
HCO3 BLD-SCNC: 14 MMOL/L (ref 21–28)
HCO3 BLD-SCNC: 14 MMOL/L (ref 21–28)
HCO3 BLD-SCNC: 15 MMOL/L (ref 21–28)
HCO3 BLD-SCNC: 15 MMOL/L (ref 21–28)
HCO3 BLD-SCNC: 16 MMOL/L (ref 21–28)
HCO3 BLD-SCNC: 16 MMOL/L (ref 21–28)
HCO3 BLD-SCNC: 17 MMOL/L (ref 21–28)
HCO3 BLD-SCNC: 19 MMOL/L (ref 21–28)
HCO3 BLD-SCNC: 20 MMOL/L (ref 21–28)
HCO3 BLD-SCNC: 21 MMOL/L (ref 21–28)
HCO3 BLD-SCNC: 24 MMOL/L (ref 21–28)
HCO3 BLD-SCNC: 25 MMOL/L (ref 21–28)
HCO3 BLD-SCNC: 25 MMOL/L (ref 21–28)
HCO3 BLD-SCNC: 26 MMOL/L (ref 21–28)
HCO3 BLD-SCNC: 27 MMOL/L (ref 21–28)
HCO3 BLD-SCNC: 29 MMOL/L (ref 21–28)
HCO3 BLD-SCNC: 30 MMOL/L (ref 21–28)
HCO3 BLDV-SCNC: 17 MMOL/L (ref 21–28)
HCT VFR BLD AUTO: 19.4 % (ref 35–47)
HCT VFR BLD AUTO: 20 % (ref 35–47)
HCT VFR BLD AUTO: 21.1 % (ref 35–47)
HCT VFR BLD AUTO: 21.5 % (ref 35–47)
HCT VFR BLD AUTO: 21.7 % (ref 35–47)
HCT VFR BLD AUTO: 22 % (ref 35–47)
HCT VFR BLD AUTO: 22.2 % (ref 35–47)
HCT VFR BLD AUTO: 22.4 % (ref 35–47)
HCT VFR BLD AUTO: 22.4 % (ref 35–47)
HCT VFR BLD AUTO: 22.6 % (ref 35–47)
HCT VFR BLD AUTO: 23 % (ref 35–47)
HCT VFR BLD AUTO: 23.1 % (ref 35–47)
HCT VFR BLD AUTO: 23.2 % (ref 35–47)
HCT VFR BLD AUTO: 23.2 % (ref 35–47)
HCT VFR BLD AUTO: 23.3 % (ref 35–47)
HCT VFR BLD AUTO: 23.3 % (ref 35–47)
HCT VFR BLD AUTO: 23.5 % (ref 35–47)
HCT VFR BLD AUTO: 23.5 % (ref 35–47)
HCT VFR BLD AUTO: 23.6 % (ref 35–47)
HCT VFR BLD AUTO: 23.8 % (ref 35–47)
HCT VFR BLD AUTO: 24 % (ref 35–47)
HCT VFR BLD AUTO: 24.1 % (ref 35–47)
HCT VFR BLD AUTO: 24.1 % (ref 35–47)
HCT VFR BLD AUTO: 24.4 % (ref 35–47)
HCT VFR BLD AUTO: 24.5 % (ref 35–47)
HCT VFR BLD AUTO: 24.6 % (ref 35–47)
HCT VFR BLD AUTO: 24.6 % (ref 35–47)
HCT VFR BLD AUTO: 24.8 % (ref 35–47)
HCT VFR BLD AUTO: 25.1 % (ref 35–47)
HCT VFR BLD AUTO: 25.4 % (ref 35–47)
HCT VFR BLD AUTO: 25.6 % (ref 35–47)
HCT VFR BLD AUTO: 25.6 % (ref 35–47)
HCT VFR BLD AUTO: 25.8 % (ref 35–47)
HCT VFR BLD AUTO: 26 % (ref 35–47)
HCT VFR BLD AUTO: 26.9 % (ref 35–47)
HCT VFR BLD AUTO: 27.4 % (ref 35–47)
HCT VFR BLD AUTO: 27.6 % (ref 35–47)
HCT VFR BLD AUTO: 28.4 % (ref 35–47)
HCT VFR BLD AUTO: 30.3 % (ref 35–47)
HCT VFR BLD AUTO: 30.8 % (ref 35–47)
HCT VFR BLD AUTO: 31.3 % (ref 35–47)
HGB BLD-MCNC: 10.2 G/DL (ref 11.7–15.7)
HGB BLD-MCNC: 10.3 G/DL (ref 11.7–15.7)
HGB BLD-MCNC: 10.6 G/DL (ref 11.7–15.7)
HGB BLD-MCNC: 6.4 G/DL (ref 11.7–15.7)
HGB BLD-MCNC: 6.7 G/DL (ref 11.7–15.7)
HGB BLD-MCNC: 6.9 G/DL (ref 11.7–15.7)
HGB BLD-MCNC: 7.1 G/DL (ref 11.7–15.7)
HGB BLD-MCNC: 7.1 G/DL (ref 11.7–15.7)
HGB BLD-MCNC: 7.2 G/DL (ref 11.7–15.7)
HGB BLD-MCNC: 7.2 G/DL (ref 11.7–15.7)
HGB BLD-MCNC: 7.4 G/DL (ref 11.7–15.7)
HGB BLD-MCNC: 7.5 G/DL (ref 11.7–15.7)
HGB BLD-MCNC: 7.5 G/DL (ref 11.7–15.7)
HGB BLD-MCNC: 7.6 G/DL (ref 11.7–15.7)
HGB BLD-MCNC: 7.7 G/DL (ref 11.7–15.7)
HGB BLD-MCNC: 7.8 G/DL (ref 11.7–15.7)
HGB BLD-MCNC: 7.9 G/DL (ref 11.7–15.7)
HGB BLD-MCNC: 8 G/DL (ref 11.7–15.7)
HGB BLD-MCNC: 8 G/DL (ref 11.7–15.7)
HGB BLD-MCNC: 8.1 G/DL (ref 11.7–15.7)
HGB BLD-MCNC: 8.2 G/DL (ref 11.7–15.7)
HGB BLD-MCNC: 8.3 G/DL (ref 11.7–15.7)
HGB BLD-MCNC: 8.4 G/DL (ref 11.7–15.7)
HGB BLD-MCNC: 8.4 G/DL (ref 11.7–15.7)
HGB BLD-MCNC: 8.5 G/DL (ref 11.7–15.7)
HGB BLD-MCNC: 8.6 G/DL (ref 11.7–15.7)
HGB BLD-MCNC: 8.7 G/DL (ref 11.7–15.7)
HGB BLD-MCNC: 8.8 G/DL (ref 11.7–15.7)
HGB BLD-MCNC: 9.6 G/DL (ref 11.7–15.7)
HGB UR QL STRIP: ABNORMAL
HOLD SPECIMEN: NORMAL
HOLD SPECIMEN: NORMAL
HYALINE CASTS: 33 /LPF
IMM GRANULOCYTES # BLD: 0.2 10E3/UL
IMM GRANULOCYTES # BLD: 1.1 10E3/UL
IMM GRANULOCYTES NFR BLD: 1 %
IMM GRANULOCYTES NFR BLD: 2 %
IMP: NOT DETECTED
INR PPP: 1.75 (ref 0.85–1.15)
INR PPP: 2.02 (ref 0.85–1.15)
INR PPP: 2.23 (ref 0.85–1.15)
INR PPP: 2.24 (ref 0.85–1.15)
INR PPP: 2.37 (ref 0.85–1.15)
INR PPP: 2.48 (ref 0.85–1.15)
INR PPP: 2.51 (ref 0.85–1.15)
INR PPP: 2.52 (ref 0.85–1.15)
INR PPP: 2.53 (ref 0.85–1.15)
INR PPP: 2.53 (ref 0.85–1.15)
INR PPP: 2.57 (ref 0.85–1.15)
INR PPP: 2.65 (ref 0.85–1.15)
INR PPP: 2.66 (ref 0.85–1.15)
INR PPP: 2.68 (ref 0.85–1.15)
INR PPP: 2.72 (ref 0.85–1.15)
INR PPP: 2.82 (ref 0.85–1.15)
INR PPP: 2.88 (ref 0.85–1.15)
INR PPP: 2.95 (ref 0.85–1.15)
INR PPP: 2.97 (ref 0.85–1.15)
INR PPP: 3.05 (ref 0.85–1.15)
INR PPP: 3.42 (ref 0.85–1.15)
INR PPP: 3.46 (ref 0.85–1.15)
INR PPP: 3.56 (ref 0.85–1.15)
INR PPP: 4.66 (ref 0.85–1.15)
INR PPP: 5.27 (ref 0.85–1.15)
INR PPP: 6.18 (ref 0.85–1.15)
INTERPRETATION ECG - MUSE: NORMAL
ISSUE DATE AND TIME: NORMAL
KETONES UR STRIP-MCNC: ABNORMAL MG/DL
KLEBSIELLA OXYTOCA: NOT DETECTED
KLEBSIELLA PNEUMONIAE: NOT DETECTED
KOH PREPARATION: NORMAL
KOH PREPARATION: NORMAL
KPC: NOT DETECTED
LACTATE BLD-SCNC: 5.5 MMOL/L
LACTATE SERPL-SCNC: 10.2 MMOL/L (ref 0.7–2)
LACTATE SERPL-SCNC: 10.2 MMOL/L (ref 0.7–2)
LACTATE SERPL-SCNC: 10.3 MMOL/L (ref 0.7–2)
LACTATE SERPL-SCNC: 11.4 MMOL/L (ref 0.7–2)
LACTATE SERPL-SCNC: 11.8 MMOL/L (ref 0.7–2)
LACTATE SERPL-SCNC: 11.9 MMOL/L (ref 0.7–2)
LACTATE SERPL-SCNC: 12.4 MMOL/L (ref 0.7–2)
LACTATE SERPL-SCNC: 12.4 MMOL/L (ref 0.7–2)
LACTATE SERPL-SCNC: 13.2 MMOL/L (ref 0.7–2)
LACTATE SERPL-SCNC: 13.5 MMOL/L (ref 0.7–2)
LACTATE SERPL-SCNC: 13.6 MMOL/L (ref 0.7–2)
LACTATE SERPL-SCNC: 14.1 MMOL/L (ref 0.7–2)
LACTATE SERPL-SCNC: 14.5 MMOL/L (ref 0.7–2)
LACTATE SERPL-SCNC: 14.8 MMOL/L (ref 0.7–2)
LACTATE SERPL-SCNC: 15 MMOL/L (ref 0.7–2)
LACTATE SERPL-SCNC: 16 MMOL/L (ref 0.7–2)
LACTATE SERPL-SCNC: 2 MMOL/L (ref 0.7–2)
LACTATE SERPL-SCNC: 2.6 MMOL/L (ref 0.7–2)
LACTATE SERPL-SCNC: 2.7 MMOL/L (ref 0.7–2)
LACTATE SERPL-SCNC: 2.8 MMOL/L (ref 0.7–2)
LACTATE SERPL-SCNC: 3.1 MMOL/L (ref 0.7–2)
LACTATE SERPL-SCNC: 3.1 MMOL/L (ref 0.7–2)
LACTATE SERPL-SCNC: 3.2 MMOL/L (ref 0.7–2)
LACTATE SERPL-SCNC: 3.2 MMOL/L (ref 0.7–2)
LACTATE SERPL-SCNC: 3.4 MMOL/L (ref 0.7–2)
LACTATE SERPL-SCNC: 3.7 MMOL/L (ref 0.7–2)
LACTATE SERPL-SCNC: 3.7 MMOL/L (ref 0.7–2)
LACTATE SERPL-SCNC: 4.6 MMOL/L (ref 0.7–2)
LACTATE SERPL-SCNC: 6.1 MMOL/L (ref 0.7–2)
LACTATE SERPL-SCNC: 6.2 MMOL/L (ref 0.7–2)
LACTATE SERPL-SCNC: 6.2 MMOL/L (ref 0.7–2)
LACTATE SERPL-SCNC: 6.6 MMOL/L (ref 0.7–2)
LACTATE SERPL-SCNC: 7 MMOL/L (ref 0.7–2)
LACTATE SERPL-SCNC: 7.2 MMOL/L (ref 0.7–2)
LACTATE SERPL-SCNC: 7.7 MMOL/L (ref 0.7–2)
LACTATE SERPL-SCNC: 7.9 MMOL/L (ref 0.7–2)
LACTATE SERPL-SCNC: 8 MMOL/L (ref 0.7–2)
LACTATE SERPL-SCNC: 8.5 MMOL/L (ref 0.7–2)
LACTATE SERPL-SCNC: 9.7 MMOL/L (ref 0.7–2)
LDH SERPL L TO P-CCNC: 167 U/L (ref 0–250)
LEUKOCYTE ESTERASE UR QL STRIP: NEGATIVE
LIPASE SERPL-CCNC: 42 U/L (ref 13–60)
LIPASE SERPL-CCNC: 49 U/L (ref 13–60)
LVEF ECHO: NORMAL
LYMPHOCYTES # BLD AUTO: 11.7 10E3/UL (ref 0.8–5.3)
LYMPHOCYTES # BLD AUTO: 17.5 10E3/UL (ref 0.8–5.3)
LYMPHOCYTES # BLD MANUAL: 14 10E3/UL (ref 0.8–5.3)
LYMPHOCYTES # BLD MANUAL: 14.7 10E3/UL (ref 0.8–5.3)
LYMPHOCYTES # BLD MANUAL: 2.5 10E3/UL (ref 0.8–5.3)
LYMPHOCYTES # BLD MANUAL: 26.7 10E3/UL (ref 0.8–5.3)
LYMPHOCYTES # BLD MANUAL: 36 10E3/UL (ref 0.8–5.3)
LYMPHOCYTES NFR BLD AUTO: 32 %
LYMPHOCYTES NFR BLD AUTO: 57 %
LYMPHOCYTES NFR BLD MANUAL: 11 %
LYMPHOCYTES NFR BLD MANUAL: 38 %
LYMPHOCYTES NFR BLD MANUAL: 62 %
LYMPHOCYTES NFR BLD MANUAL: 70 %
LYMPHOCYTES NFR BLD MANUAL: 80 %
LYMPHOCYTES NFR FLD MANUAL: 1 %
LYMPHOCYTES NFR FLD MANUAL: 1 %
LYMPHOCYTES NFR FLD MANUAL: 15 %
MAGNESIUM SERPL-MCNC: 1.8 MG/DL (ref 1.7–2.3)
MAGNESIUM SERPL-MCNC: 1.8 MG/DL (ref 1.7–2.3)
MAGNESIUM SERPL-MCNC: 1.9 MG/DL (ref 1.7–2.3)
MAGNESIUM SERPL-MCNC: 2 MG/DL (ref 1.7–2.3)
MAGNESIUM SERPL-MCNC: 2.1 MG/DL (ref 1.7–2.3)
MAGNESIUM SERPL-MCNC: 2.2 MG/DL (ref 1.7–2.3)
MAGNESIUM SERPL-MCNC: 2.3 MG/DL (ref 1.7–2.3)
MAGNESIUM SERPL-MCNC: 2.4 MG/DL (ref 1.7–2.3)
MCH RBC QN AUTO: 31.1 PG (ref 26.5–33)
MCH RBC QN AUTO: 31.1 PG (ref 26.5–33)
MCH RBC QN AUTO: 31.2 PG (ref 26.5–33)
MCH RBC QN AUTO: 31.2 PG (ref 26.5–33)
MCH RBC QN AUTO: 31.3 PG (ref 26.5–33)
MCH RBC QN AUTO: 31.4 PG (ref 26.5–33)
MCH RBC QN AUTO: 31.5 PG (ref 26.5–33)
MCH RBC QN AUTO: 31.6 PG (ref 26.5–33)
MCH RBC QN AUTO: 31.6 PG (ref 26.5–33)
MCH RBC QN AUTO: 31.7 PG (ref 26.5–33)
MCH RBC QN AUTO: 31.8 PG (ref 26.5–33)
MCH RBC QN AUTO: 31.8 PG (ref 26.5–33)
MCH RBC QN AUTO: 31.9 PG (ref 26.5–33)
MCH RBC QN AUTO: 31.9 PG (ref 26.5–33)
MCH RBC QN AUTO: 32 PG (ref 26.5–33)
MCH RBC QN AUTO: 32 PG (ref 26.5–33)
MCH RBC QN AUTO: 32.1 PG (ref 26.5–33)
MCH RBC QN AUTO: 32.2 PG (ref 26.5–33)
MCH RBC QN AUTO: 32.2 PG (ref 26.5–33)
MCH RBC QN AUTO: 32.4 PG (ref 26.5–33)
MCH RBC QN AUTO: 32.5 PG (ref 26.5–33)
MCH RBC QN AUTO: 32.6 PG (ref 26.5–33)
MCH RBC QN AUTO: 32.6 PG (ref 26.5–33)
MCH RBC QN AUTO: 32.7 PG (ref 26.5–33)
MCH RBC QN AUTO: 32.8 PG (ref 26.5–33)
MCH RBC QN AUTO: 32.9 PG (ref 26.5–33)
MCH RBC QN AUTO: 33 PG (ref 26.5–33)
MCH RBC QN AUTO: 33.1 PG (ref 26.5–33)
MCH RBC QN AUTO: 33.2 PG (ref 26.5–33)
MCH RBC QN AUTO: 33.3 PG (ref 26.5–33)
MCH RBC QN AUTO: 33.6 PG (ref 26.5–33)
MCHC RBC AUTO-ENTMCNC: 31.1 G/DL (ref 31.5–36.5)
MCHC RBC AUTO-ENTMCNC: 31.2 G/DL (ref 31.5–36.5)
MCHC RBC AUTO-ENTMCNC: 31.2 G/DL (ref 31.5–36.5)
MCHC RBC AUTO-ENTMCNC: 31.9 G/DL (ref 31.5–36.5)
MCHC RBC AUTO-ENTMCNC: 32 G/DL (ref 31.5–36.5)
MCHC RBC AUTO-ENTMCNC: 32 G/DL (ref 31.5–36.5)
MCHC RBC AUTO-ENTMCNC: 32.1 G/DL (ref 31.5–36.5)
MCHC RBC AUTO-ENTMCNC: 32.1 G/DL (ref 31.5–36.5)
MCHC RBC AUTO-ENTMCNC: 32.2 G/DL (ref 31.5–36.5)
MCHC RBC AUTO-ENTMCNC: 32.4 G/DL (ref 31.5–36.5)
MCHC RBC AUTO-ENTMCNC: 32.4 G/DL (ref 31.5–36.5)
MCHC RBC AUTO-ENTMCNC: 32.5 G/DL (ref 31.5–36.5)
MCHC RBC AUTO-ENTMCNC: 32.6 G/DL (ref 31.5–36.5)
MCHC RBC AUTO-ENTMCNC: 32.7 G/DL (ref 31.5–36.5)
MCHC RBC AUTO-ENTMCNC: 32.8 G/DL (ref 31.5–36.5)
MCHC RBC AUTO-ENTMCNC: 32.8 G/DL (ref 31.5–36.5)
MCHC RBC AUTO-ENTMCNC: 33 G/DL (ref 31.5–36.5)
MCHC RBC AUTO-ENTMCNC: 33 G/DL (ref 31.5–36.5)
MCHC RBC AUTO-ENTMCNC: 33.1 G/DL (ref 31.5–36.5)
MCHC RBC AUTO-ENTMCNC: 33.2 G/DL (ref 31.5–36.5)
MCHC RBC AUTO-ENTMCNC: 33.3 G/DL (ref 31.5–36.5)
MCHC RBC AUTO-ENTMCNC: 33.3 G/DL (ref 31.5–36.5)
MCHC RBC AUTO-ENTMCNC: 33.4 G/DL (ref 31.5–36.5)
MCHC RBC AUTO-ENTMCNC: 33.5 G/DL (ref 31.5–36.5)
MCHC RBC AUTO-ENTMCNC: 33.5 G/DL (ref 31.5–36.5)
MCHC RBC AUTO-ENTMCNC: 33.6 G/DL (ref 31.5–36.5)
MCHC RBC AUTO-ENTMCNC: 33.7 G/DL (ref 31.5–36.5)
MCHC RBC AUTO-ENTMCNC: 33.8 G/DL (ref 31.5–36.5)
MCHC RBC AUTO-ENTMCNC: 33.9 G/DL (ref 31.5–36.5)
MCHC RBC AUTO-ENTMCNC: 34.3 G/DL (ref 31.5–36.5)
MCHC RBC AUTO-ENTMCNC: 34.5 G/DL (ref 31.5–36.5)
MCHC RBC AUTO-ENTMCNC: 34.9 G/DL (ref 31.5–36.5)
MCV RBC AUTO: 100 FL (ref 78–100)
MCV RBC AUTO: 101 FL (ref 78–100)
MCV RBC AUTO: 102 FL (ref 78–100)
MCV RBC AUTO: 103 FL (ref 78–100)
MCV RBC AUTO: 105 FL (ref 78–100)
MCV RBC AUTO: 107 FL (ref 78–100)
MCV RBC AUTO: 90 FL (ref 78–100)
MCV RBC AUTO: 91 FL (ref 78–100)
MCV RBC AUTO: 92 FL (ref 78–100)
MCV RBC AUTO: 93 FL (ref 78–100)
MCV RBC AUTO: 94 FL (ref 78–100)
MCV RBC AUTO: 94 FL (ref 78–100)
MCV RBC AUTO: 95 FL (ref 78–100)
MCV RBC AUTO: 95 FL (ref 78–100)
MCV RBC AUTO: 96 FL (ref 78–100)
MCV RBC AUTO: 97 FL (ref 78–100)
MCV RBC AUTO: 98 FL (ref 78–100)
MCV RBC AUTO: 99 FL (ref 78–100)
METAMYELOCYTES # BLD MANUAL: 0.4 10E3/UL
METAMYELOCYTES NFR BLD MANUAL: 2 %
MONOCYTES # BLD AUTO: 1.2 10E3/UL (ref 0–1.3)
MONOCYTES # BLD AUTO: 1.5 10E3/UL (ref 0–1.3)
MONOCYTES # BLD MANUAL: 0.6 10E3/UL (ref 0–1.3)
MONOCYTES # BLD MANUAL: 0.6 10E3/UL (ref 0–1.3)
MONOCYTES # BLD MANUAL: 0.7 10E3/UL (ref 0–1.3)
MONOCYTES # BLD MANUAL: 1.2 10E3/UL (ref 0–1.3)
MONOCYTES # BLD MANUAL: 1.8 10E3/UL (ref 0–1.3)
MONOCYTES NFR BLD AUTO: 3 %
MONOCYTES NFR BLD AUTO: 6 %
MONOCYTES NFR BLD MANUAL: 1 %
MONOCYTES NFR BLD MANUAL: 2 %
MONOCYTES NFR BLD MANUAL: 3 %
MONOCYTES NFR BLD MANUAL: 3 %
MONOCYTES NFR BLD MANUAL: 8 %
MONOS+MACROS NFR FLD MANUAL: 14 %
MONOS+MACROS NFR FLD MANUAL: 3 %
MONOS+MACROS NFR FLD MANUAL: ABNORMAL %
MRSA DNA SPEC QL NAA+PROBE: NEGATIVE
MUCOUS THREADS #/AREA URNS LPF: PRESENT /LPF
NDM: NOT DETECTED
NEUTROPHILS # BLD AUTO: 34.6 10E3/UL (ref 1.6–8.3)
NEUTROPHILS # BLD AUTO: 7 10E3/UL (ref 1.6–8.3)
NEUTROPHILS # BLD MANUAL: 18.2 10E3/UL (ref 1.6–8.3)
NEUTROPHILS # BLD MANUAL: 2.6 10E3/UL (ref 1.6–8.3)
NEUTROPHILS # BLD MANUAL: 20.9 10E3/UL (ref 1.6–8.3)
NEUTROPHILS # BLD MANUAL: 42.9 10E3/UL (ref 1.6–8.3)
NEUTROPHILS # BLD MANUAL: 5.4 10E3/UL (ref 1.6–8.3)
NEUTROPHILS NFR BLD AUTO: 35 %
NEUTROPHILS NFR BLD AUTO: 63 %
NEUTROPHILS NFR BLD MANUAL: 14 %
NEUTROPHILS NFR BLD MANUAL: 27 %
NEUTROPHILS NFR BLD MANUAL: 36 %
NEUTROPHILS NFR BLD MANUAL: 61 %
NEUTROPHILS NFR BLD MANUAL: 81 %
NEUTS BAND NFR FLD MANUAL: 85 %
NEUTS BAND NFR FLD MANUAL: 85 %
NEUTS BAND NFR FLD MANUAL: 96 %
NITRATE UR QL: NEGATIVE
NRBC # BLD AUTO: 0.2 10E3/UL
NRBC # BLD AUTO: 1.1 10E3/UL
NRBC BLD AUTO-RTO: 1 /100
NRBC BLD MANUAL-RTO: 5 %
O2/TOTAL GAS SETTING VFR VENT: 100 %
O2/TOTAL GAS SETTING VFR VENT: 45 %
O2/TOTAL GAS SETTING VFR VENT: 50 %
O2/TOTAL GAS SETTING VFR VENT: 55 %
O2/TOTAL GAS SETTING VFR VENT: 60 %
O2/TOTAL GAS SETTING VFR VENT: 75 %
O2/TOTAL GAS SETTING VFR VENT: 75 %
O2/TOTAL GAS SETTING VFR VENT: 80 %
OXA (DETECTED/NOT DETECTED): NOT DETECTED
OXYHGB MFR BLD: 93 % (ref 92–100)
P AXIS - MUSE: 50 DEGREES
P AXIS - MUSE: 54 DEGREES
P AXIS - MUSE: 67 DEGREES
PATH REPORT.COMMENTS IMP SPEC: NORMAL
PATH REPORT.FINAL DX SPEC: NORMAL
PATH REPORT.MICROSCOPIC SPEC OTHER STN: NORMAL
PATH REPORT.MICROSCOPIC SPEC OTHER STN: NORMAL
PCO2 BLD: 24 MM HG (ref 35–45)
PCO2 BLD: 24 MM HG (ref 35–45)
PCO2 BLD: 26 MM HG (ref 35–45)
PCO2 BLD: 27 MM HG (ref 35–45)
PCO2 BLD: 28 MM HG (ref 35–45)
PCO2 BLD: 28 MM HG (ref 35–45)
PCO2 BLD: 30 MM HG (ref 35–45)
PCO2 BLD: 31 MM HG (ref 35–45)
PCO2 BLD: 31 MM HG (ref 35–45)
PCO2 BLD: 32 MM HG (ref 35–45)
PCO2 BLD: 32 MM HG (ref 35–45)
PCO2 BLD: 33 MM HG (ref 35–45)
PCO2 BLD: 36 MM HG (ref 35–45)
PCO2 BLD: 37 MM HG (ref 35–45)
PCO2 BLD: 38 MM HG (ref 35–45)
PCO2 BLD: 40 MM HG (ref 35–45)
PCO2 BLD: 40 MM HG (ref 35–45)
PCO2 BLD: 42 MM HG (ref 35–45)
PCO2 BLD: 44 MM HG (ref 35–45)
PCO2 BLD: 49 MM HG (ref 35–45)
PCO2 BLDV: 33 MM HG (ref 40–50)
PERFORMING LABORATORY: NORMAL
PH BLD: 6.99 [PH] (ref 7.35–7.45)
PH BLD: 7.07 [PH] (ref 7.35–7.45)
PH BLD: 7.25 [PH] (ref 7.35–7.45)
PH BLD: 7.28 [PH] (ref 7.35–7.45)
PH BLD: 7.28 [PH] (ref 7.35–7.45)
PH BLD: 7.29 [PH] (ref 7.35–7.45)
PH BLD: 7.32 [PH] (ref 7.35–7.45)
PH BLD: 7.33 [PH] (ref 7.35–7.45)
PH BLD: 7.34 [PH] (ref 7.35–7.45)
PH BLD: 7.35 [PH] (ref 7.35–7.45)
PH BLD: 7.36 [PH] (ref 7.35–7.45)
PH BLD: 7.36 [PH] (ref 7.35–7.45)
PH BLD: 7.38 [PH] (ref 7.35–7.45)
PH BLD: 7.4 [PH] (ref 7.35–7.45)
PH BLD: 7.42 [PH] (ref 7.35–7.45)
PH BLD: 7.44 [PH] (ref 7.35–7.45)
PH BLD: 7.44 [PH] (ref 7.35–7.45)
PH BLD: 7.45 [PH] (ref 7.35–7.45)
PH BLD: 7.46 [PH] (ref 7.35–7.45)
PH BLD: 7.47 [PH] (ref 7.35–7.45)
PH BLD: 7.47 [PH] (ref 7.35–7.45)
PH BLD: 7.48 [PH] (ref 7.35–7.45)
PH BLD: 7.48 [PH] (ref 7.35–7.45)
PH BLD: 7.51 [PH] (ref 7.35–7.45)
PH BLD: 7.53 [PH] (ref 7.35–7.45)
PH BLD: 7.53 [PH] (ref 7.35–7.45)
PH BLDV: 7.32 [PH] (ref 7.32–7.43)
PH UR STRIP: 5 [PH] (ref 5–7)
PHOSPHATE SERPL-MCNC: 2.4 MG/DL (ref 2.5–4.5)
PHOSPHATE SERPL-MCNC: 3 MG/DL (ref 2.5–4.5)
PHOSPHATE SERPL-MCNC: 3.1 MG/DL (ref 2.5–4.5)
PHOSPHATE SERPL-MCNC: 3.3 MG/DL (ref 2.5–4.5)
PHOSPHATE SERPL-MCNC: 3.4 MG/DL (ref 2.5–4.5)
PHOSPHATE SERPL-MCNC: 3.5 MG/DL (ref 2.5–4.5)
PHOSPHATE SERPL-MCNC: 3.6 MG/DL (ref 2.5–4.5)
PHOSPHATE SERPL-MCNC: 3.6 MG/DL (ref 2.5–4.5)
PHOSPHATE SERPL-MCNC: 3.7 MG/DL (ref 2.5–4.5)
PHOSPHATE SERPL-MCNC: 3.7 MG/DL (ref 2.5–4.5)
PHOSPHATE SERPL-MCNC: 3.8 MG/DL (ref 2.5–4.5)
PHOSPHATE SERPL-MCNC: 3.8 MG/DL (ref 2.5–4.5)
PHOSPHATE SERPL-MCNC: 3.9 MG/DL (ref 2.5–4.5)
PHOSPHATE SERPL-MCNC: 3.9 MG/DL (ref 2.5–4.5)
PHOSPHATE SERPL-MCNC: 4 MG/DL (ref 2.5–4.5)
PHOSPHATE SERPL-MCNC: 4.1 MG/DL (ref 2.5–4.5)
PHOSPHATE SERPL-MCNC: 4.1 MG/DL (ref 2.5–4.5)
PHOSPHATE SERPL-MCNC: 4.2 MG/DL (ref 2.5–4.5)
PHOSPHATE SERPL-MCNC: 4.3 MG/DL (ref 2.5–4.5)
PHOSPHATE SERPL-MCNC: 4.4 MG/DL (ref 2.5–4.5)
PHOSPHATE SERPL-MCNC: 4.4 MG/DL (ref 2.5–4.5)
PHOSPHATE SERPL-MCNC: 4.5 MG/DL (ref 2.5–4.5)
PHOSPHATE SERPL-MCNC: 4.5 MG/DL (ref 2.5–4.5)
PHOSPHATE SERPL-MCNC: 4.6 MG/DL (ref 2.5–4.5)
PHOSPHATE SERPL-MCNC: 4.7 MG/DL (ref 2.5–4.5)
PHOSPHATE SERPL-MCNC: 4.8 MG/DL (ref 2.5–4.5)
PHOSPHATE SERPL-MCNC: 4.9 MG/DL (ref 2.5–4.5)
PHOSPHATE SERPL-MCNC: 5.1 MG/DL (ref 2.5–4.5)
PHOSPHATE SERPL-MCNC: 5.7 MG/DL (ref 2.5–4.5)
PLAT MORPH BLD: ABNORMAL
PLAT MORPH BLD: NORMAL
PLATELET # BLD AUTO: 13 10E3/UL (ref 150–450)
PLATELET # BLD AUTO: 14 10E3/UL (ref 150–450)
PLATELET # BLD AUTO: 15 10E3/UL (ref 150–450)
PLATELET # BLD AUTO: 15 10E3/UL (ref 150–450)
PLATELET # BLD AUTO: 16 10E3/UL (ref 150–450)
PLATELET # BLD AUTO: 17 10E3/UL (ref 150–450)
PLATELET # BLD AUTO: 20 10E3/UL (ref 150–450)
PLATELET # BLD AUTO: 20 10E3/UL (ref 150–450)
PLATELET # BLD AUTO: 21 10E3/UL (ref 150–450)
PLATELET # BLD AUTO: 23 10E3/UL (ref 150–450)
PLATELET # BLD AUTO: 25 10E3/UL (ref 150–450)
PLATELET # BLD AUTO: 27 10E3/UL (ref 150–450)
PLATELET # BLD AUTO: 28 10E3/UL (ref 150–450)
PLATELET # BLD AUTO: 29 10E3/UL (ref 150–450)
PLATELET # BLD AUTO: 31 10E3/UL (ref 150–450)
PLATELET # BLD AUTO: 31 10E3/UL (ref 150–450)
PLATELET # BLD AUTO: 32 10E3/UL (ref 150–450)
PLATELET # BLD AUTO: 33 10E3/UL (ref 150–450)
PLATELET # BLD AUTO: 34 10E3/UL (ref 150–450)
PLATELET # BLD AUTO: 35 10E3/UL (ref 150–450)
PLATELET # BLD AUTO: 36 10E3/UL (ref 150–450)
PLATELET # BLD AUTO: 37 10E3/UL (ref 150–450)
PLATELET # BLD AUTO: 39 10E3/UL (ref 150–450)
PLATELET # BLD AUTO: 40 10E3/UL (ref 150–450)
PLATELET # BLD AUTO: 48 10E3/UL (ref 150–450)
PLATELET # BLD AUTO: 49 10E3/UL (ref 150–450)
PLATELET # BLD AUTO: 53 10E3/UL (ref 150–450)
PLATELET # BLD AUTO: 53 10E3/UL (ref 150–450)
PLATELET # BLD AUTO: 58 10E3/UL (ref 150–450)
PLATELET # BLD AUTO: 59 10E3/UL (ref 150–450)
PLATELET # BLD AUTO: 60 10E3/UL (ref 150–450)
PLATELET # BLD AUTO: 60 10E3/UL (ref 150–450)
PLATELET # BLD AUTO: 62 10E3/UL (ref 150–450)
PLATELET # BLD AUTO: 66 10E3/UL (ref 150–450)
PLATELET # BLD AUTO: 70 10E3/UL (ref 150–450)
PO2 BLD: 115 MM HG (ref 80–105)
PO2 BLD: 56 MM HG (ref 80–105)
PO2 BLD: 56 MM HG (ref 80–105)
PO2 BLD: 57 MM HG (ref 80–105)
PO2 BLD: 62 MM HG (ref 80–105)
PO2 BLD: 62 MM HG (ref 80–105)
PO2 BLD: 64 MM HG (ref 80–105)
PO2 BLD: 64 MM HG (ref 80–105)
PO2 BLD: 65 MM HG (ref 80–105)
PO2 BLD: 66 MM HG (ref 80–105)
PO2 BLD: 69 MM HG (ref 80–105)
PO2 BLD: 70 MM HG (ref 80–105)
PO2 BLD: 71 MM HG (ref 80–105)
PO2 BLD: 72 MM HG (ref 80–105)
PO2 BLD: 75 MM HG (ref 80–105)
PO2 BLD: 82 MM HG (ref 80–105)
PO2 BLD: 82 MM HG (ref 80–105)
PO2 BLD: 83 MM HG (ref 80–105)
PO2 BLD: 84 MM HG (ref 80–105)
PO2 BLD: 87 MM HG (ref 80–105)
PO2 BLD: 88 MM HG (ref 80–105)
PO2 BLD: 91 MM HG (ref 80–105)
PO2 BLD: 93 MM HG (ref 80–105)
PO2 BLD: 99 MM HG (ref 80–105)
PO2 BLDV: 28 MM HG (ref 25–47)
POTASSIUM SERPL-SCNC: 3.2 MMOL/L (ref 3.4–5.3)
POTASSIUM SERPL-SCNC: 3.4 MMOL/L (ref 3.4–5.3)
POTASSIUM SERPL-SCNC: 3.5 MMOL/L (ref 3.4–5.3)
POTASSIUM SERPL-SCNC: 3.5 MMOL/L (ref 3.4–5.3)
POTASSIUM SERPL-SCNC: 3.6 MMOL/L (ref 3.4–5.3)
POTASSIUM SERPL-SCNC: 3.7 MMOL/L (ref 3.4–5.3)
POTASSIUM SERPL-SCNC: 3.8 MMOL/L (ref 3.4–5.3)
POTASSIUM SERPL-SCNC: 3.8 MMOL/L (ref 3.4–5.3)
POTASSIUM SERPL-SCNC: 3.9 MMOL/L (ref 3.4–5.3)
POTASSIUM SERPL-SCNC: 4 MMOL/L (ref 3.4–5.3)
POTASSIUM SERPL-SCNC: 4.1 MMOL/L (ref 3.4–5.3)
POTASSIUM SERPL-SCNC: 4.2 MMOL/L (ref 3.4–5.3)
POTASSIUM SERPL-SCNC: 4.3 MMOL/L (ref 3.4–5.3)
POTASSIUM SERPL-SCNC: 4.4 MMOL/L (ref 3.4–5.3)
POTASSIUM SERPL-SCNC: 4.5 MMOL/L (ref 3.4–5.3)
POTASSIUM SERPL-SCNC: 4.6 MMOL/L (ref 3.4–5.3)
POTASSIUM SERPL-SCNC: 4.6 MMOL/L (ref 3.4–5.3)
POTASSIUM SERPL-SCNC: 4.7 MMOL/L (ref 3.4–5.3)
POTASSIUM SERPL-SCNC: 4.8 MMOL/L (ref 3.4–5.3)
POTASSIUM SERPL-SCNC: 5 MMOL/L (ref 3.4–5.3)
POTASSIUM SERPL-SCNC: 5 MMOL/L (ref 3.4–5.3)
POTASSIUM SERPL-SCNC: 5.2 MMOL/L (ref 3.4–5.3)
POTASSIUM SERPL-SCNC: 5.3 MMOL/L (ref 3.4–5.3)
PR INTERVAL - MUSE: 172 MS
PR INTERVAL - MUSE: 182 MS
PR INTERVAL - MUSE: 192 MS
PROCALCITONIN SERPL IA-MCNC: 0.72 NG/ML
PROT FLD-MCNC: 0.6 G/DL
PROT SERPL-MCNC: 4.3 G/DL (ref 6.4–8.3)
PROT SERPL-MCNC: 4.4 G/DL (ref 6.4–8.3)
PROT SERPL-MCNC: 4.4 G/DL (ref 6.4–8.3)
PROT SERPL-MCNC: 4.6 G/DL (ref 6.4–8.3)
PROT SERPL-MCNC: 4.6 G/DL (ref 6.4–8.3)
PROT SERPL-MCNC: 4.7 G/DL (ref 6.4–8.3)
PROT SERPL-MCNC: 4.8 G/DL (ref 6.4–8.3)
PROT SERPL-MCNC: 4.8 G/DL (ref 6.4–8.3)
PROT SERPL-MCNC: 4.9 G/DL (ref 6.4–8.3)
PROT SERPL-MCNC: 5 G/DL (ref 6.4–8.3)
PROT SERPL-MCNC: 5 G/DL (ref 6.4–8.3)
PROT SERPL-MCNC: 5.1 G/DL (ref 6.4–8.3)
PROT SERPL-MCNC: 5.1 G/DL (ref 6.4–8.3)
PROT SERPL-MCNC: 5.2 G/DL (ref 6.4–8.3)
PROT SERPL-MCNC: 5.2 G/DL (ref 6.4–8.3)
PROT SERPL-MCNC: 5.4 G/DL (ref 6.4–8.3)
PROT SERPL-MCNC: 5.6 G/DL (ref 6.4–8.3)
PROTEIN BODY FLUID SOURCE: NORMAL
PROTEUS SPECIES: NOT DETECTED
PSEUDOMONAS AERUGINOSA: NOT DETECTED
QRS DURATION - MUSE: 100 MS
QRS DURATION - MUSE: 110 MS
QRS DURATION - MUSE: 96 MS
QT - MUSE: 356 MS
QT - MUSE: 384 MS
QT - MUSE: 392 MS
QTC - MUSE: 415 MS
QTC - MUSE: 420 MS
QTC - MUSE: 434 MS
R AXIS - MUSE: -13 DEGREES
R AXIS - MUSE: -15 DEGREES
R AXIS - MUSE: -9 DEGREES
RBC # BLD AUTO: 2.01 10E6/UL (ref 3.8–5.2)
RBC # BLD AUTO: 2.06 10E6/UL (ref 3.8–5.2)
RBC # BLD AUTO: 2.07 10E6/UL (ref 3.8–5.2)
RBC # BLD AUTO: 2.16 10E6/UL (ref 3.8–5.2)
RBC # BLD AUTO: 2.2 10E6/UL (ref 3.8–5.2)
RBC # BLD AUTO: 2.21 10E6/UL (ref 3.8–5.2)
RBC # BLD AUTO: 2.22 10E6/UL (ref 3.8–5.2)
RBC # BLD AUTO: 2.24 10E6/UL (ref 3.8–5.2)
RBC # BLD AUTO: 2.26 10E6/UL (ref 3.8–5.2)
RBC # BLD AUTO: 2.26 10E6/UL (ref 3.8–5.2)
RBC # BLD AUTO: 2.32 10E6/UL (ref 3.8–5.2)
RBC # BLD AUTO: 2.32 10E6/UL (ref 3.8–5.2)
RBC # BLD AUTO: 2.34 10E6/UL (ref 3.8–5.2)
RBC # BLD AUTO: 2.34 10E6/UL (ref 3.8–5.2)
RBC # BLD AUTO: 2.36 10E6/UL (ref 3.8–5.2)
RBC # BLD AUTO: 2.37 10E6/UL (ref 3.8–5.2)
RBC # BLD AUTO: 2.41 10E6/UL (ref 3.8–5.2)
RBC # BLD AUTO: 2.42 10E6/UL (ref 3.8–5.2)
RBC # BLD AUTO: 2.48 10E6/UL (ref 3.8–5.2)
RBC # BLD AUTO: 2.49 10E6/UL (ref 3.8–5.2)
RBC # BLD AUTO: 2.49 10E6/UL (ref 3.8–5.2)
RBC # BLD AUTO: 2.5 10E6/UL (ref 3.8–5.2)
RBC # BLD AUTO: 2.52 10E6/UL (ref 3.8–5.2)
RBC # BLD AUTO: 2.53 10E6/UL (ref 3.8–5.2)
RBC # BLD AUTO: 2.54 10E6/UL (ref 3.8–5.2)
RBC # BLD AUTO: 2.55 10E6/UL (ref 3.8–5.2)
RBC # BLD AUTO: 2.56 10E6/UL (ref 3.8–5.2)
RBC # BLD AUTO: 2.56 10E6/UL (ref 3.8–5.2)
RBC # BLD AUTO: 2.57 10E6/UL (ref 3.8–5.2)
RBC # BLD AUTO: 2.57 10E6/UL (ref 3.8–5.2)
RBC # BLD AUTO: 2.59 10E6/UL (ref 3.8–5.2)
RBC # BLD AUTO: 2.61 10E6/UL (ref 3.8–5.2)
RBC # BLD AUTO: 2.62 10E6/UL (ref 3.8–5.2)
RBC # BLD AUTO: 2.65 10E6/UL (ref 3.8–5.2)
RBC # BLD AUTO: 2.7 10E6/UL (ref 3.8–5.2)
RBC # BLD AUTO: 2.72 10E6/UL (ref 3.8–5.2)
RBC # BLD AUTO: 2.78 10E6/UL (ref 3.8–5.2)
RBC # BLD AUTO: 3.03 10E6/UL (ref 3.8–5.2)
RBC # BLD AUTO: 3.12 10E6/UL (ref 3.8–5.2)
RBC # BLD AUTO: 3.12 10E6/UL (ref 3.8–5.2)
RBC # BLD AUTO: 3.2 10E6/UL (ref 3.8–5.2)
RBC # FLD: 6150 /UL
RBC MORPH BLD: ABNORMAL
RBC MORPH BLD: NORMAL
RBC URINE: 2 /HPF
RETICS # AUTO: 0.07 10E6/UL (ref 0.03–0.1)
RETICS/RBC NFR AUTO: 2.6 % (ref 0.5–2)
RSV RNA SPEC NAA+PROBE: NEGATIVE
SA TARGET DNA: NEGATIVE
SAO2 % BLDV: 49 % (ref 94–100)
SARS-COV-2 RNA RESP QL NAA+PROBE: NEGATIVE
SMUDGE CELLS BLD QL SMEAR: PRESENT
SODIUM SERPL-SCNC: 130 MMOL/L (ref 136–145)
SODIUM SERPL-SCNC: 130 MMOL/L (ref 136–145)
SODIUM SERPL-SCNC: 132 MMOL/L (ref 136–145)
SODIUM SERPL-SCNC: 133 MMOL/L (ref 136–145)
SODIUM SERPL-SCNC: 134 MMOL/L (ref 136–145)
SODIUM SERPL-SCNC: 135 MMOL/L (ref 136–145)
SODIUM SERPL-SCNC: 136 MMOL/L (ref 136–145)
SODIUM SERPL-SCNC: 137 MMOL/L (ref 136–145)
SODIUM SERPL-SCNC: 138 MMOL/L (ref 136–145)
SODIUM SERPL-SCNC: 139 MMOL/L (ref 136–145)
SODIUM SERPL-SCNC: 139 MMOL/L (ref 136–145)
SODIUM UR-SCNC: 20 MMOL/L
SP GR UR STRIP: 1.03 (ref 1–1.03)
SPECIMEN EXPIRATION DATE: NORMAL
SQUAMOUS EPITHELIAL: 1 /HPF
SYSTOLIC BLOOD PRESSURE - MUSE: NORMAL MMHG
T AXIS - MUSE: 30 DEGREES
T AXIS - MUSE: 56 DEGREES
T AXIS - MUSE: 73 DEGREES
TEST NAME: NORMAL
TOXIC GRANULES BLD QL SMEAR: PRESENT
TROPONIN T SERPL HS-MCNC: 12 NG/L
TROPONIN T SERPL HS-MCNC: 14 NG/L
UNIT ABO/RH: NORMAL
UNIT NUMBER: NORMAL
UNIT STATUS: NORMAL
UNIT TYPE ISBT: 600
UNIT TYPE ISBT: 6200
UNIT TYPE ISBT: 8400
UROBILINOGEN UR STRIP-MCNC: NORMAL MG/DL
VANCOMYCIN SERPL-MCNC: 16.1 UG/ML
VANCOMYCIN SERPL-MCNC: 8.8 UG/ML
VENTRICULAR RATE- MUSE: 69 BPM
VENTRICULAR RATE- MUSE: 77 BPM
VENTRICULAR RATE- MUSE: 82 BPM
VIM: NOT DETECTED
WBC # BLD AUTO: 14.9 10E3/UL (ref 4–11)
WBC # BLD AUTO: 15.3 10E3/UL (ref 4–11)
WBC # BLD AUTO: 18.4 10E3/UL (ref 4–11)
WBC # BLD AUTO: 19.1 10E3/UL (ref 4–11)
WBC # BLD AUTO: 20 10E3/UL (ref 4–11)
WBC # BLD AUTO: 20 10E3/UL (ref 4–11)
WBC # BLD AUTO: 20.3 10E3/UL (ref 4–11)
WBC # BLD AUTO: 20.9 10E3/UL (ref 4–11)
WBC # BLD AUTO: 22.1 10E3/UL (ref 4–11)
WBC # BLD AUTO: 22.5 10E3/UL (ref 4–11)
WBC # BLD AUTO: 22.5 10E3/UL (ref 4–11)
WBC # BLD AUTO: 22.8 10E3/UL (ref 4–11)
WBC # BLD AUTO: 24.8 10E3/UL (ref 4–11)
WBC # BLD AUTO: 24.8 10E3/UL (ref 4–11)
WBC # BLD AUTO: 27.4 10E3/UL (ref 4–11)
WBC # BLD AUTO: 28.2 10E3/UL (ref 4–11)
WBC # BLD AUTO: 30.3 10E3/UL (ref 4–11)
WBC # BLD AUTO: 31.9 10E3/UL (ref 4–11)
WBC # BLD AUTO: 33.3 10E3/UL (ref 4–11)
WBC # BLD AUTO: 34.2 10E3/UL (ref 4–11)
WBC # BLD AUTO: 35.3 10E3/UL (ref 4–11)
WBC # BLD AUTO: 35.3 10E3/UL (ref 4–11)
WBC # BLD AUTO: 36.4 10E3/UL (ref 4–11)
WBC # BLD AUTO: 36.5 10E3/UL (ref 4–11)
WBC # BLD AUTO: 38.9 10E3/UL (ref 4–11)
WBC # BLD AUTO: 39 10E3/UL (ref 4–11)
WBC # BLD AUTO: 39.4 10E3/UL (ref 4–11)
WBC # BLD AUTO: 40.9 10E3/UL (ref 4–11)
WBC # BLD AUTO: 43.4 10E3/UL (ref 4–11)
WBC # BLD AUTO: 44 10E3/UL (ref 4–11)
WBC # BLD AUTO: 45.1 10E3/UL (ref 4–11)
WBC # BLD AUTO: 47.9 10E3/UL (ref 4–11)
WBC # BLD AUTO: 48.3 10E3/UL (ref 4–11)
WBC # BLD AUTO: 49.9 10E3/UL (ref 4–11)
WBC # BLD AUTO: 52.1 10E3/UL (ref 4–11)
WBC # BLD AUTO: 55.2 10E3/UL (ref 4–11)
WBC # BLD AUTO: 56.7 10E3/UL (ref 4–11)
WBC # BLD AUTO: 58.1 10E3/UL (ref 4–11)
WBC # BLD AUTO: 60.8 10E3/UL (ref 4–11)
WBC # BLD AUTO: 65.9 10E3/UL (ref 4–11)
WBC # BLD AUTO: 68.4 10E3/UL (ref 4–11)
WBC # BLD AUTO: 69.2 10E3/UL (ref 4–11)
WBC # BLD AUTO: 70.3 10E3/UL (ref 4–11)
WBC # BLD AUTO: 70.3 10E3/UL (ref 4–11)
WBC # BLD AUTO: ABNORMAL 10*3/UL
WBC # FLD AUTO: 8450 /UL
WBC # FLD AUTO: ABNORMAL /UL
WBC # FLD AUTO: ABNORMAL 10*3/UL
WBC URINE: 3 /HPF

## 2023-01-01 PROCEDURE — 999N000065 XR CHEST PORT 1 VIEW

## 2023-01-01 PROCEDURE — 250N000013 HC RX MED GY IP 250 OP 250 PS 637: Performed by: INTERNAL MEDICINE

## 2023-01-01 PROCEDURE — 84100 ASSAY OF PHOSPHORUS: CPT | Performed by: STUDENT IN AN ORGANIZED HEALTH CARE EDUCATION/TRAINING PROGRAM

## 2023-01-01 PROCEDURE — 250N000011 HC RX IP 250 OP 636: Performed by: ANESTHESIOLOGY

## 2023-01-01 PROCEDURE — 99291 CRITICAL CARE FIRST HOUR: CPT | Mod: 25

## 2023-01-01 PROCEDURE — 250N000009 HC RX 250: Performed by: STUDENT IN AN ORGANIZED HEALTH CARE EDUCATION/TRAINING PROGRAM

## 2023-01-01 PROCEDURE — 85610 PROTHROMBIN TIME: CPT | Performed by: INTERNAL MEDICINE

## 2023-01-01 PROCEDURE — 80053 COMPREHEN METABOLIC PANEL: CPT | Performed by: EMERGENCY MEDICINE

## 2023-01-01 PROCEDURE — 999N000157 HC STATISTIC RCP TIME EA 10 MIN

## 2023-01-01 PROCEDURE — 99207 PR BUNDLED PROCEDURE IN GLOBAL PKG STATISTIC: CPT | Performed by: PHYSICIAN ASSISTANT

## 2023-01-01 PROCEDURE — 80053 COMPREHEN METABOLIC PANEL: CPT | Performed by: STUDENT IN AN ORGANIZED HEALTH CARE EDUCATION/TRAINING PROGRAM

## 2023-01-01 PROCEDURE — 94644 CONT INHLJ TX 1ST HOUR: CPT

## 2023-01-01 PROCEDURE — 250N000011 HC RX IP 250 OP 636: Performed by: INTERNAL MEDICINE

## 2023-01-01 PROCEDURE — 99233 SBSQ HOSP IP/OBS HIGH 50: CPT | Performed by: INTERNAL MEDICINE

## 2023-01-01 PROCEDURE — 85730 THROMBOPLASTIN TIME PARTIAL: CPT | Performed by: INTERNAL MEDICINE

## 2023-01-01 PROCEDURE — 250N000009 HC RX 250: Performed by: INTERNAL MEDICINE

## 2023-01-01 PROCEDURE — 83735 ASSAY OF MAGNESIUM: CPT | Performed by: INTERNAL MEDICINE

## 2023-01-01 PROCEDURE — 87070 CULTURE OTHR SPECIMN AEROBIC: CPT | Performed by: INTERNAL MEDICINE

## 2023-01-01 PROCEDURE — 258N000001 HC RX 258: Performed by: INTERNAL MEDICINE

## 2023-01-01 PROCEDURE — 250N000009 HC RX 250: Performed by: EMERGENCY MEDICINE

## 2023-01-01 PROCEDURE — 87637 SARSCOV2&INF A&B&RSV AMP PRB: CPT

## 2023-01-01 PROCEDURE — 83605 ASSAY OF LACTIC ACID: CPT | Performed by: INTERNAL MEDICINE

## 2023-01-01 PROCEDURE — 84460 ALANINE AMINO (ALT) (SGPT): CPT | Performed by: STUDENT IN AN ORGANIZED HEALTH CARE EDUCATION/TRAINING PROGRAM

## 2023-01-01 PROCEDURE — 258N000003 HC RX IP 258 OP 636: Performed by: ANESTHESIOLOGY

## 2023-01-01 PROCEDURE — 85014 HEMATOCRIT: CPT | Performed by: INTERNAL MEDICINE

## 2023-01-01 PROCEDURE — 99232 SBSQ HOSP IP/OBS MODERATE 35: CPT | Performed by: INTERNAL MEDICINE

## 2023-01-01 PROCEDURE — 84155 ASSAY OF PROTEIN SERUM: CPT | Performed by: INTERNAL MEDICINE

## 2023-01-01 PROCEDURE — 999N000208 ECHOCARDIOGRAM COMPLETE

## 2023-01-01 PROCEDURE — 258N000003 HC RX IP 258 OP 636: Performed by: STUDENT IN AN ORGANIZED HEALTH CARE EDUCATION/TRAINING PROGRAM

## 2023-01-01 PROCEDURE — 82330 ASSAY OF CALCIUM: CPT | Performed by: INTERNAL MEDICINE

## 2023-01-01 PROCEDURE — P9035 PLATELET PHERES LEUKOREDUCED: HCPCS

## 2023-01-01 PROCEDURE — 83605 ASSAY OF LACTIC ACID: CPT | Performed by: EMERGENCY MEDICINE

## 2023-01-01 PROCEDURE — 84157 ASSAY OF PROTEIN OTHER: CPT | Performed by: EMERGENCY MEDICINE

## 2023-01-01 PROCEDURE — 99239 HOSP IP/OBS DSCHRG MGMT >30: CPT | Performed by: INTERNAL MEDICINE

## 2023-01-01 PROCEDURE — P9016 RBC LEUKOCYTES REDUCED: HCPCS | Performed by: INTERNAL MEDICINE

## 2023-01-01 PROCEDURE — 05HN33Z INSERTION OF INFUSION DEVICE INTO LEFT INTERNAL JUGULAR VEIN, PERCUTANEOUS APPROACH: ICD-10-PCS | Performed by: INTERNAL MEDICINE

## 2023-01-01 PROCEDURE — 82247 BILIRUBIN TOTAL: CPT | Performed by: INTERNAL MEDICINE

## 2023-01-01 PROCEDURE — 82803 BLOOD GASES ANY COMBINATION: CPT | Performed by: INTERNAL MEDICINE

## 2023-01-01 PROCEDURE — 87077 CULTURE AEROBIC IDENTIFY: CPT | Performed by: EMERGENCY MEDICINE

## 2023-01-01 PROCEDURE — 82803 BLOOD GASES ANY COMBINATION: CPT | Performed by: ANESTHESIOLOGY

## 2023-01-01 PROCEDURE — 82140 ASSAY OF AMMONIA: CPT | Performed by: HOSPITALIST

## 2023-01-01 PROCEDURE — 71045 X-RAY EXAM CHEST 1 VIEW: CPT

## 2023-01-01 PROCEDURE — 87040 BLOOD CULTURE FOR BACTERIA: CPT | Performed by: INTERNAL MEDICINE

## 2023-01-01 PROCEDURE — 84155 ASSAY OF PROTEIN SERUM: CPT | Performed by: STUDENT IN AN ORGANIZED HEALTH CARE EDUCATION/TRAINING PROGRAM

## 2023-01-01 PROCEDURE — 5A1955Z RESPIRATORY VENTILATION, GREATER THAN 96 CONSECUTIVE HOURS: ICD-10-PCS | Performed by: INTERNAL MEDICINE

## 2023-01-01 PROCEDURE — C9113 INJ PANTOPRAZOLE SODIUM, VIA: HCPCS | Performed by: INTERNAL MEDICINE

## 2023-01-01 PROCEDURE — 82310 ASSAY OF CALCIUM: CPT | Performed by: STUDENT IN AN ORGANIZED HEALTH CARE EDUCATION/TRAINING PROGRAM

## 2023-01-01 PROCEDURE — 82803 BLOOD GASES ANY COMBINATION: CPT | Performed by: HOSPITALIST

## 2023-01-01 PROCEDURE — 99233 SBSQ HOSP IP/OBS HIGH 50: CPT | Performed by: HOSPITALIST

## 2023-01-01 PROCEDURE — 86901 BLOOD TYPING SEROLOGIC RH(D): CPT | Performed by: INTERNAL MEDICINE

## 2023-01-01 PROCEDURE — 87149 DNA/RNA DIRECT PROBE: CPT | Mod: 91

## 2023-01-01 PROCEDURE — 84450 TRANSFERASE (AST) (SGOT): CPT | Performed by: STUDENT IN AN ORGANIZED HEALTH CARE EDUCATION/TRAINING PROGRAM

## 2023-01-01 PROCEDURE — P9037 PLATE PHERES LEUKOREDU IRRAD: HCPCS | Performed by: INTERNAL MEDICINE

## 2023-01-01 PROCEDURE — 83690 ASSAY OF LIPASE: CPT | Performed by: EMERGENCY MEDICINE

## 2023-01-01 PROCEDURE — 99291 CRITICAL CARE FIRST HOUR: CPT | Performed by: INTERNAL MEDICINE

## 2023-01-01 PROCEDURE — 258N000003 HC RX IP 258 OP 636: Performed by: INTERNAL MEDICINE

## 2023-01-01 PROCEDURE — 82040 ASSAY OF SERUM ALBUMIN: CPT | Performed by: STUDENT IN AN ORGANIZED HEALTH CARE EDUCATION/TRAINING PROGRAM

## 2023-01-01 PROCEDURE — 99291 CRITICAL CARE FIRST HOUR: CPT | Mod: 25 | Performed by: INTERNAL MEDICINE

## 2023-01-01 PROCEDURE — 82533 TOTAL CORTISOL: CPT | Performed by: INTERNAL MEDICINE

## 2023-01-01 PROCEDURE — 99285 EMERGENCY DEPT VISIT HI MDM: CPT | Mod: 25

## 2023-01-01 PROCEDURE — G1010 CDSM STANSON: HCPCS

## 2023-01-01 PROCEDURE — 85025 COMPLETE CBC W/AUTO DIFF WBC: CPT | Performed by: EMERGENCY MEDICINE

## 2023-01-01 PROCEDURE — 258N000003 HC RX IP 258 OP 636: Performed by: HOSPITALIST

## 2023-01-01 PROCEDURE — 999N000185 HC STATISTIC TRANSPORT TIME EA 15 MIN

## 2023-01-01 PROCEDURE — 89051 BODY FLUID CELL COUNT: CPT | Performed by: EMERGENCY MEDICINE

## 2023-01-01 PROCEDURE — 36556 INSERT NON-TUNNEL CV CATH: CPT

## 2023-01-01 PROCEDURE — 200N000001 HC R&B ICU

## 2023-01-01 PROCEDURE — 36415 COLL VENOUS BLD VENIPUNCTURE: CPT | Performed by: EMERGENCY MEDICINE

## 2023-01-01 PROCEDURE — 82248 BILIRUBIN DIRECT: CPT | Performed by: STUDENT IN AN ORGANIZED HEALTH CARE EDUCATION/TRAINING PROGRAM

## 2023-01-01 PROCEDURE — P9012 CRYOPRECIPITATE EACH UNIT: HCPCS | Performed by: INTERNAL MEDICINE

## 2023-01-01 PROCEDURE — 94003 VENT MGMT INPAT SUBQ DAY: CPT

## 2023-01-01 PROCEDURE — 85007 BL SMEAR W/DIFF WBC COUNT: CPT

## 2023-01-01 PROCEDURE — 99223 1ST HOSP IP/OBS HIGH 75: CPT | Performed by: STUDENT IN AN ORGANIZED HEALTH CARE EDUCATION/TRAINING PROGRAM

## 2023-01-01 PROCEDURE — 86870 RBC ANTIBODY IDENTIFICATION: CPT | Performed by: INTERNAL MEDICINE

## 2023-01-01 PROCEDURE — 36600 WITHDRAWAL OF ARTERIAL BLOOD: CPT

## 2023-01-01 PROCEDURE — 250N000009 HC RX 250

## 2023-01-01 PROCEDURE — 99231 SBSQ HOSP IP/OBS SF/LOW 25: CPT | Performed by: INTERNAL MEDICINE

## 2023-01-01 PROCEDURE — 87040 BLOOD CULTURE FOR BACTERIA: CPT | Performed by: HOSPITALIST

## 2023-01-01 PROCEDURE — P9047 ALBUMIN (HUMAN), 25%, 50ML: HCPCS | Performed by: INTERNAL MEDICINE

## 2023-01-01 PROCEDURE — 250N000013 HC RX MED GY IP 250 OP 250 PS 637

## 2023-01-01 PROCEDURE — 82247 BILIRUBIN TOTAL: CPT | Performed by: STUDENT IN AN ORGANIZED HEALTH CARE EDUCATION/TRAINING PROGRAM

## 2023-01-01 PROCEDURE — 250N000011 HC RX IP 250 OP 636: Performed by: EMERGENCY MEDICINE

## 2023-01-01 PROCEDURE — 85007 BL SMEAR W/DIFF WBC COUNT: CPT | Performed by: PHYSICIAN ASSISTANT

## 2023-01-01 PROCEDURE — 250N000013 HC RX MED GY IP 250 OP 250 PS 637: Performed by: HOSPITALIST

## 2023-01-01 PROCEDURE — 83010 ASSAY OF HAPTOGLOBIN QUANT: CPT | Performed by: INTERNAL MEDICINE

## 2023-01-01 PROCEDURE — 85027 COMPLETE CBC AUTOMATED: CPT | Performed by: INTERNAL MEDICINE

## 2023-01-01 PROCEDURE — 250N000013 HC RX MED GY IP 250 OP 250 PS 637: Performed by: EMERGENCY MEDICINE

## 2023-01-01 PROCEDURE — 86860 RBC ANTIBODY ELUTION: CPT | Performed by: INTERNAL MEDICINE

## 2023-01-01 PROCEDURE — 85027 COMPLETE CBC AUTOMATED: CPT | Performed by: EMERGENCY MEDICINE

## 2023-01-01 PROCEDURE — 250N000011 HC RX IP 250 OP 636: Performed by: STUDENT IN AN ORGANIZED HEALTH CARE EDUCATION/TRAINING PROGRAM

## 2023-01-01 PROCEDURE — 250N000012 HC RX MED GY IP 250 OP 636 PS 637: Performed by: HOSPITALIST

## 2023-01-01 PROCEDURE — 80069 RENAL FUNCTION PANEL: CPT | Performed by: STUDENT IN AN ORGANIZED HEALTH CARE EDUCATION/TRAINING PROGRAM

## 2023-01-01 PROCEDURE — 250N000011 HC RX IP 250 OP 636

## 2023-01-01 PROCEDURE — 85384 FIBRINOGEN ACTIVITY: CPT | Performed by: INTERNAL MEDICINE

## 2023-01-01 PROCEDURE — 86850 RBC ANTIBODY SCREEN: CPT | Performed by: INTERNAL MEDICINE

## 2023-01-01 PROCEDURE — 99207 PR NO BILLABLE SERVICE THIS VISIT: CPT | Performed by: INTERNAL MEDICINE

## 2023-01-01 PROCEDURE — 250N000009 HC RX 250: Performed by: NURSE ANESTHETIST, CERTIFIED REGISTERED

## 2023-01-01 PROCEDURE — 80053 COMPREHEN METABOLIC PANEL: CPT

## 2023-01-01 PROCEDURE — 84100 ASSAY OF PHOSPHORUS: CPT | Performed by: INTERNAL MEDICINE

## 2023-01-01 PROCEDURE — 87205 SMEAR GRAM STAIN: CPT | Performed by: INTERNAL MEDICINE

## 2023-01-01 PROCEDURE — 80053 COMPREHEN METABOLIC PANEL: CPT | Performed by: INTERNAL MEDICINE

## 2023-01-01 PROCEDURE — 99207 PR NO CHARGE LOS: CPT | Performed by: NURSE PRACTITIONER

## 2023-01-01 PROCEDURE — 87102 FUNGUS ISOLATION CULTURE: CPT | Performed by: INTERNAL MEDICINE

## 2023-01-01 PROCEDURE — 88305 TISSUE EXAM BY PATHOLOGIST: CPT | Mod: TC | Performed by: INTERNAL MEDICINE

## 2023-01-01 PROCEDURE — 99223 1ST HOSP IP/OBS HIGH 75: CPT | Performed by: INTERNAL MEDICINE

## 2023-01-01 PROCEDURE — 93010 ELECTROCARDIOGRAM REPORT: CPT | Performed by: INTERNAL MEDICINE

## 2023-01-01 PROCEDURE — 99232 SBSQ HOSP IP/OBS MODERATE 35: CPT | Performed by: NURSE PRACTITIONER

## 2023-01-01 PROCEDURE — 99223 1ST HOSP IP/OBS HIGH 75: CPT | Mod: AI | Performed by: INTERNAL MEDICINE

## 2023-01-01 PROCEDURE — 0W9G3ZZ DRAINAGE OF PERITONEAL CAVITY, PERCUTANEOUS APPROACH: ICD-10-PCS | Performed by: RADIOLOGY

## 2023-01-01 PROCEDURE — 82042 OTHER SOURCE ALBUMIN QUAN EA: CPT | Performed by: EMERGENCY MEDICINE

## 2023-01-01 PROCEDURE — 87077 CULTURE AEROBIC IDENTIFY: CPT

## 2023-01-01 PROCEDURE — 999N000065 XR ABDOMEN PORT 1 VIEW

## 2023-01-01 PROCEDURE — 272N000706 US PARACENTESIS WITHOUT ALBUMIN

## 2023-01-01 PROCEDURE — 85007 BL SMEAR W/DIFF WBC COUNT: CPT | Performed by: INTERNAL MEDICINE

## 2023-01-01 PROCEDURE — 74176 CT ABD & PELVIS W/O CONTRAST: CPT | Mod: MA

## 2023-01-01 PROCEDURE — 36415 COLL VENOUS BLD VENIPUNCTURE: CPT

## 2023-01-01 PROCEDURE — 99222 1ST HOSP IP/OBS MODERATE 55: CPT | Performed by: NURSE PRACTITIONER

## 2023-01-01 PROCEDURE — 99232 SBSQ HOSP IP/OBS MODERATE 35: CPT | Performed by: STUDENT IN AN ORGANIZED HEALTH CARE EDUCATION/TRAINING PROGRAM

## 2023-01-01 PROCEDURE — 83615 LACTATE (LD) (LDH) ENZYME: CPT | Performed by: INTERNAL MEDICINE

## 2023-01-01 PROCEDURE — 86923 COMPATIBILITY TEST ELECTRIC: CPT | Performed by: INTERNAL MEDICINE

## 2023-01-01 PROCEDURE — 99233 SBSQ HOSP IP/OBS HIGH 50: CPT | Performed by: STUDENT IN AN ORGANIZED HEALTH CARE EDUCATION/TRAINING PROGRAM

## 2023-01-01 PROCEDURE — 87070 CULTURE OTHR SPECIMN AEROBIC: CPT | Performed by: EMERGENCY MEDICINE

## 2023-01-01 PROCEDURE — G0426 INPT/ED TELECONSULT50: HCPCS | Mod: G0 | Performed by: NURSE PRACTITIONER

## 2023-01-01 PROCEDURE — 370N000003 HC ANESTHESIA WARD SERVICE: Performed by: NURSE ANESTHETIST, CERTIFIED REGISTERED

## 2023-01-01 PROCEDURE — 93005 ELECTROCARDIOGRAM TRACING: CPT

## 2023-01-01 PROCEDURE — 0W9G3ZX DRAINAGE OF PERITONEAL CAVITY, PERCUTANEOUS APPROACH, DIAGNOSTIC: ICD-10-PCS | Performed by: INTERNAL MEDICINE

## 2023-01-01 PROCEDURE — 5A1D90Z PERFORMANCE OF URINARY FILTRATION, CONTINUOUS, GREATER THAN 18 HOURS PER DAY: ICD-10-PCS | Performed by: STUDENT IN AN ORGANIZED HEALTH CARE EDUCATION/TRAINING PROGRAM

## 2023-01-01 PROCEDURE — 85018 HEMOGLOBIN: CPT | Performed by: INTERNAL MEDICINE

## 2023-01-01 PROCEDURE — 82962 GLUCOSE BLOOD TEST: CPT

## 2023-01-01 PROCEDURE — 81001 URINALYSIS AUTO W/SCOPE: CPT | Performed by: INTERNAL MEDICINE

## 2023-01-01 PROCEDURE — 87116 MYCOBACTERIA CULTURE: CPT | Performed by: INTERNAL MEDICINE

## 2023-01-01 PROCEDURE — 85045 AUTOMATED RETICULOCYTE COUNT: CPT | Performed by: INTERNAL MEDICINE

## 2023-01-01 PROCEDURE — 31624 DX BRONCHOSCOPE/LAVAGE: CPT | Performed by: INTERNAL MEDICINE

## 2023-01-01 PROCEDURE — 83605 ASSAY OF LACTIC ACID: CPT

## 2023-01-01 PROCEDURE — 89051 BODY FLUID CELL COUNT: CPT | Performed by: INTERNAL MEDICINE

## 2023-01-01 PROCEDURE — P9037 PLATE PHERES LEUKOREDU IRRAD: HCPCS | Performed by: SURGERY

## 2023-01-01 PROCEDURE — 87641 MR-STAPH DNA AMP PROBE: CPT | Performed by: INTERNAL MEDICINE

## 2023-01-01 PROCEDURE — 87206 SMEAR FLUORESCENT/ACID STAI: CPT | Performed by: INTERNAL MEDICINE

## 2023-01-01 PROCEDURE — 258N000001 HC RX 258

## 2023-01-01 PROCEDURE — 84132 ASSAY OF SERUM POTASSIUM: CPT | Performed by: HOSPITALIST

## 2023-01-01 PROCEDURE — 87071 CULTURE AEROBIC QUANT OTHER: CPT | Performed by: INTERNAL MEDICINE

## 2023-01-01 PROCEDURE — 84132 ASSAY OF SERUM POTASSIUM: CPT | Performed by: STUDENT IN AN ORGANIZED HEALTH CARE EDUCATION/TRAINING PROGRAM

## 2023-01-01 PROCEDURE — 258N000003 HC RX IP 258 OP 636: Performed by: EMERGENCY MEDICINE

## 2023-01-01 PROCEDURE — 83690 ASSAY OF LIPASE: CPT

## 2023-01-01 PROCEDURE — P9059 PLASMA, FRZ BETWEEN 8-24HOUR: HCPCS | Performed by: SURGERY

## 2023-01-01 PROCEDURE — 258N000001 HC RX 258: Performed by: HOSPITALIST

## 2023-01-01 PROCEDURE — C9803 HOPD COVID-19 SPEC COLLECT: HCPCS

## 2023-01-01 PROCEDURE — 49083 ABD PARACENTESIS W/IMAGING: CPT

## 2023-01-01 PROCEDURE — 85027 COMPLETE CBC AUTOMATED: CPT

## 2023-01-01 PROCEDURE — 85060 BLOOD SMEAR INTERPRETATION: CPT | Performed by: PATHOLOGY

## 2023-01-01 PROCEDURE — 96360 HYDRATION IV INFUSION INIT: CPT

## 2023-01-01 PROCEDURE — P9059 PLASMA, FRZ BETWEEN 8-24HOUR: HCPCS | Performed by: INTERNAL MEDICINE

## 2023-01-01 PROCEDURE — 99223 1ST HOSP IP/OBS HIGH 75: CPT | Performed by: SPECIALIST

## 2023-01-01 PROCEDURE — 82803 BLOOD GASES ANY COMBINATION: CPT

## 2023-01-01 PROCEDURE — 88305 TISSUE EXAM BY PATHOLOGIST: CPT | Mod: 26 | Performed by: PATHOLOGY

## 2023-01-01 PROCEDURE — 86880 COOMBS TEST DIRECT: CPT | Performed by: INTERNAL MEDICINE

## 2023-01-01 PROCEDURE — 88108 CYTOPATH CONCENTRATE TECH: CPT | Mod: 26 | Performed by: PATHOLOGY

## 2023-01-01 PROCEDURE — 70450 CT HEAD/BRAIN W/O DYE: CPT | Mod: MG

## 2023-01-01 PROCEDURE — 99231 SBSQ HOSP IP/OBS SF/LOW 25: CPT | Performed by: STUDENT IN AN ORGANIZED HEALTH CARE EDUCATION/TRAINING PROGRAM

## 2023-01-01 PROCEDURE — 3E043XZ INTRODUCTION OF VASOPRESSOR INTO CENTRAL VEIN, PERCUTANEOUS APPROACH: ICD-10-PCS | Performed by: EMERGENCY MEDICINE

## 2023-01-01 PROCEDURE — 85018 HEMOGLOBIN: CPT | Performed by: HOSPITALIST

## 2023-01-01 PROCEDURE — 82248 BILIRUBIN DIRECT: CPT | Performed by: EMERGENCY MEDICINE

## 2023-01-01 PROCEDURE — 49083 ABD PARACENTESIS W/IMAGING: CPT | Performed by: INTERNAL MEDICINE

## 2023-01-01 PROCEDURE — 250N000009 HC RX 250: Performed by: RADIOLOGY

## 2023-01-01 PROCEDURE — 87493 C DIFF AMPLIFIED PROBE: CPT | Performed by: INTERNAL MEDICINE

## 2023-01-01 PROCEDURE — P9012 CRYOPRECIPITATE EACH UNIT: HCPCS | Performed by: HOSPITALIST

## 2023-01-01 PROCEDURE — 82805 BLOOD GASES W/O2 SATURATION: CPT | Performed by: INTERNAL MEDICINE

## 2023-01-01 PROCEDURE — 87081 CULTURE SCREEN ONLY: CPT | Performed by: INTERNAL MEDICINE

## 2023-01-01 PROCEDURE — 84999 UNLISTED CHEMISTRY PROCEDURE: CPT | Performed by: INTERNAL MEDICINE

## 2023-01-01 PROCEDURE — 87210 SMEAR WET MOUNT SALINE/INK: CPT | Performed by: INTERNAL MEDICINE

## 2023-01-01 PROCEDURE — 36556 INSERT NON-TUNNEL CV CATH: CPT | Performed by: INTERNAL MEDICINE

## 2023-01-01 PROCEDURE — G0463 HOSPITAL OUTPT CLINIC VISIT: HCPCS

## 2023-01-01 PROCEDURE — 84484 ASSAY OF TROPONIN QUANT: CPT

## 2023-01-01 PROCEDURE — 85027 COMPLETE CBC AUTOMATED: CPT | Performed by: HOSPITALIST

## 2023-01-01 PROCEDURE — 93306 TTE W/DOPPLER COMPLETE: CPT | Mod: 26 | Performed by: INTERNAL MEDICINE

## 2023-01-01 PROCEDURE — 84300 ASSAY OF URINE SODIUM: CPT | Performed by: INTERNAL MEDICINE

## 2023-01-01 PROCEDURE — 85007 BL SMEAR W/DIFF WBC COUNT: CPT | Performed by: EMERGENCY MEDICINE

## 2023-01-01 PROCEDURE — P9012 CRYOPRECIPITATE EACH UNIT: HCPCS | Performed by: SURGERY

## 2023-01-01 PROCEDURE — 83735 ASSAY OF MAGNESIUM: CPT | Performed by: STUDENT IN AN ORGANIZED HEALTH CARE EDUCATION/TRAINING PROGRAM

## 2023-01-01 PROCEDURE — 250N000011 HC RX IP 250 OP 636: Performed by: NURSE ANESTHETIST, CERTIFIED REGISTERED

## 2023-01-01 PROCEDURE — 99291 CRITICAL CARE FIRST HOUR: CPT | Performed by: SURGERY

## 2023-01-01 PROCEDURE — 80202 ASSAY OF VANCOMYCIN: CPT | Performed by: INTERNAL MEDICINE

## 2023-01-01 PROCEDURE — 86900 BLOOD TYPING SEROLOGIC ABO: CPT | Performed by: INTERNAL MEDICINE

## 2023-01-01 PROCEDURE — 99498 ADVNCD CARE PLAN ADDL 30 MIN: CPT | Mod: 25 | Performed by: NURSE PRACTITIONER

## 2023-01-01 PROCEDURE — 94645 CONT INHLJ TX EACH ADDL HOUR: CPT

## 2023-01-01 PROCEDURE — 255N000002 HC RX 255 OP 636: Performed by: INTERNAL MEDICINE

## 2023-01-01 PROCEDURE — 82248 BILIRUBIN DIRECT: CPT | Performed by: INTERNAL MEDICINE

## 2023-01-01 PROCEDURE — 84484 ASSAY OF TROPONIN QUANT: CPT | Performed by: EMERGENCY MEDICINE

## 2023-01-01 PROCEDURE — 94002 VENT MGMT INPAT INIT DAY: CPT

## 2023-01-01 PROCEDURE — 99284 EMERGENCY DEPT VISIT MOD MDM: CPT

## 2023-01-01 PROCEDURE — 99497 ADVNCD CARE PLAN 30 MIN: CPT | Mod: 25 | Performed by: NURSE PRACTITIONER

## 2023-01-01 PROCEDURE — 84145 PROCALCITONIN (PCT): CPT | Performed by: INTERNAL MEDICINE

## 2023-01-01 PROCEDURE — 80051 ELECTROLYTE PANEL: CPT | Performed by: INTERNAL MEDICINE

## 2023-01-01 RX ORDER — NALOXONE HYDROCHLORIDE 0.4 MG/ML
0.4 INJECTION, SOLUTION INTRAMUSCULAR; INTRAVENOUS; SUBCUTANEOUS
Status: DISCONTINUED | OUTPATIENT
Start: 2023-01-01 | End: 2023-06-05 | Stop reason: HOSPADM

## 2023-01-01 RX ORDER — MIDAZOLAM HCL IN 0.9 % NACL/PF 1 MG/ML
1-8 PLASTIC BAG, INJECTION (ML) INTRAVENOUS CONTINUOUS
Status: DISCONTINUED | OUTPATIENT
Start: 2023-01-01 | End: 2023-06-05 | Stop reason: HOSPADM

## 2023-01-01 RX ORDER — DEXTROSE MONOHYDRATE 100 MG/ML
INJECTION, SOLUTION INTRAVENOUS CONTINUOUS PRN
Status: DISCONTINUED | OUTPATIENT
Start: 2023-01-01 | End: 2023-06-05 | Stop reason: HOSPADM

## 2023-01-01 RX ORDER — ROPIVACAINE IN 0.9% SOD CHL/PF 0.1 %
.03-.125 PLASTIC BAG, INJECTION (ML) EPIDURAL CONTINUOUS
Status: DISCONTINUED | OUTPATIENT
Start: 2023-01-01 | End: 2023-01-01

## 2023-01-01 RX ORDER — LIDOCAINE 40 MG/G
CREAM TOPICAL
Status: ACTIVE | OUTPATIENT
Start: 2023-01-01 | End: 2023-01-01

## 2023-01-01 RX ORDER — URSODIOL 250 MG/1
500 TABLET, FILM COATED ORAL 2 TIMES DAILY
Status: DISCONTINUED | OUTPATIENT
Start: 2023-01-01 | End: 2023-06-05 | Stop reason: HOSPADM

## 2023-01-01 RX ORDER — DEXTROSE MONOHYDRATE 25 G/50ML
25 INJECTION, SOLUTION INTRAVENOUS ONCE
Status: COMPLETED | OUTPATIENT
Start: 2023-01-01 | End: 2023-01-01

## 2023-01-01 RX ORDER — CALCIUM CHLORIDE, MAGNESIUM CHLORIDE, DEXTROSE MONOHYDRATE, LACTIC ACID, SODIUM CHLORIDE, SODIUM BICARBONATE AND POTASSIUM CHLORIDE 5.15; 2.03; 22; 5.4; 6.46; 3.09; .157 G/L; G/L; G/L; G/L; G/L; G/L; G/L
INJECTION INTRAVENOUS CONTINUOUS
Status: DISCONTINUED | OUTPATIENT
Start: 2023-01-01 | End: 2023-01-01

## 2023-01-01 RX ORDER — DEXTROSE MONOHYDRATE 50 MG/ML
10-20 INJECTION, SOLUTION INTRAVENOUS EVERY 24 HOURS
Status: DISCONTINUED | OUTPATIENT
Start: 2023-01-01 | End: 2023-06-05 | Stop reason: HOSPADM

## 2023-01-01 RX ORDER — DEXTROSE MONOHYDRATE 100 MG/ML
INJECTION, SOLUTION INTRAVENOUS CONTINUOUS
Status: DISCONTINUED | OUTPATIENT
Start: 2023-01-01 | End: 2023-01-01

## 2023-01-01 RX ORDER — NOREPINEPHRINE BITARTRATE 0.02 MG/ML
.01-.6 INJECTION, SOLUTION INTRAVENOUS CONTINUOUS
Status: DISCONTINUED | OUTPATIENT
Start: 2023-01-01 | End: 2023-01-01

## 2023-01-01 RX ORDER — CALCIUM GLUCONATE 20 MG/ML
2 INJECTION, SOLUTION INTRAVENOUS EVERY 8 HOURS PRN
Status: DISCONTINUED | OUTPATIENT
Start: 2023-01-01 | End: 2023-01-01

## 2023-01-01 RX ORDER — ALBUMIN (HUMAN) 12.5 G/50ML
100 SOLUTION INTRAVENOUS ONCE
Status: COMPLETED | OUTPATIENT
Start: 2023-01-01 | End: 2023-01-01

## 2023-01-01 RX ORDER — MAGNESIUM SULFATE HEPTAHYDRATE 40 MG/ML
2 INJECTION, SOLUTION INTRAVENOUS EVERY 8 HOURS PRN
Status: DISCONTINUED | OUTPATIENT
Start: 2023-01-01 | End: 2023-01-01

## 2023-01-01 RX ORDER — DEXTROSE MONOHYDRATE 25 G/50ML
25-50 INJECTION, SOLUTION INTRAVENOUS
Status: DISCONTINUED | OUTPATIENT
Start: 2023-01-01 | End: 2023-01-01

## 2023-01-01 RX ORDER — ONDANSETRON 4 MG/1
4 TABLET, ORALLY DISINTEGRATING ORAL ONCE
Status: COMPLETED | OUTPATIENT
Start: 2023-01-01 | End: 2023-01-01

## 2023-01-01 RX ORDER — NOREPINEPHRINE BITARTRATE 0.02 MG/ML
.01-.6 INJECTION, SOLUTION INTRAVENOUS CONTINUOUS
Status: DISCONTINUED | OUTPATIENT
Start: 2023-01-01 | End: 2023-01-01 | Stop reason: ALTCHOICE

## 2023-01-01 RX ORDER — NICOTINE POLACRILEX 4 MG
15-30 LOZENGE BUCCAL
Status: DISCONTINUED | OUTPATIENT
Start: 2023-01-01 | End: 2023-01-01

## 2023-01-01 RX ORDER — B COMPLEX C NO.10/FOLIC ACID 900MCG/5ML
5 LIQUID (ML) ORAL DAILY
Status: DISCONTINUED | OUTPATIENT
Start: 2023-01-01 | End: 2023-06-05 | Stop reason: HOSPADM

## 2023-01-01 RX ORDER — THYROID 30 MG/1
90 TABLET ORAL DAILY
Status: DISCONTINUED | OUTPATIENT
Start: 2023-01-01 | End: 2023-06-05 | Stop reason: HOSPADM

## 2023-01-01 RX ORDER — SPIRONOLACTONE 50 MG/1
50 TABLET, FILM COATED ORAL DAILY
COMMUNITY

## 2023-01-01 RX ORDER — DIPHENHYDRAMINE HCL 25 MG
25 CAPSULE ORAL EVERY 24 HOURS
Status: DISCONTINUED | OUTPATIENT
Start: 2023-01-01 | End: 2023-06-05 | Stop reason: HOSPADM

## 2023-01-01 RX ORDER — CIPROFLOXACIN 500 MG/1
500 TABLET, FILM COATED ORAL
Status: DISCONTINUED | OUTPATIENT
Start: 2023-01-01 | End: 2023-01-01

## 2023-01-01 RX ORDER — HYDROXYZINE HYDROCHLORIDE 25 MG/1
25 TABLET, FILM COATED ORAL ONCE
Status: COMPLETED | OUTPATIENT
Start: 2023-01-01 | End: 2023-01-01

## 2023-01-01 RX ORDER — PROPOFOL 10 MG/ML
INJECTION, EMULSION INTRAVENOUS
Status: COMPLETED
Start: 2023-01-01 | End: 2023-01-01

## 2023-01-01 RX ORDER — PROPOFOL 10 MG/ML
5-75 INJECTION, EMULSION INTRAVENOUS CONTINUOUS
Status: DISCONTINUED | OUTPATIENT
Start: 2023-01-01 | End: 2023-01-01

## 2023-01-01 RX ORDER — MAGNESIUM CARB/ALUMINUM HYDROX 105-160MG
30 TABLET,CHEWABLE ORAL ONCE
Status: DISCONTINUED | OUTPATIENT
Start: 2023-01-01 | End: 2023-01-01

## 2023-01-01 RX ORDER — FUROSEMIDE 20 MG
20 TABLET ORAL
COMMUNITY

## 2023-01-01 RX ORDER — NOREPINEPHRINE BITARTRATE 0.06 MG/ML
.01-1 INJECTION, SOLUTION INTRAVENOUS CONTINUOUS
Status: DISCONTINUED | OUTPATIENT
Start: 2023-01-01 | End: 2023-06-05 | Stop reason: HOSPADM

## 2023-01-01 RX ORDER — CALCIUM CHLORIDE, MAGNESIUM CHLORIDE, SODIUM CHLORIDE, SODIUM BICARBONATE, POTASSIUM CHLORIDE AND SODIUM PHOSPHATE DIBASIC DIHYDRATE 3.68; 3.05; 6.34; 3.09; .314; .187 G/L; G/L; G/L; G/L; G/L; G/L
INJECTION INTRAVENOUS CONTINUOUS
Status: DISCONTINUED | OUTPATIENT
Start: 2023-01-01 | End: 2023-01-01

## 2023-01-01 RX ORDER — PHENYLEPHRINE HCL IN 0.9% NACL 50MG/250ML
.1-6 PLASTIC BAG, INJECTION (ML) INTRAVENOUS CONTINUOUS
Status: DISCONTINUED | OUTPATIENT
Start: 2023-01-01 | End: 2023-01-01 | Stop reason: ALTCHOICE

## 2023-01-01 RX ORDER — METOPROLOL TARTRATE 1 MG/ML
5 INJECTION, SOLUTION INTRAVENOUS ONCE
Status: COMPLETED | OUTPATIENT
Start: 2023-01-01 | End: 2023-01-01

## 2023-01-01 RX ORDER — METOPROLOL TARTRATE 1 MG/ML
INJECTION, SOLUTION INTRAVENOUS
Status: COMPLETED
Start: 2023-01-01 | End: 2023-01-01

## 2023-01-01 RX ORDER — DEXTROSE MONOHYDRATE 25 G/50ML
INJECTION, SOLUTION INTRAVENOUS
Status: COMPLETED
Start: 2023-01-01 | End: 2023-01-01

## 2023-01-01 RX ORDER — FENTANYL CITRATE 50 UG/ML
50 INJECTION, SOLUTION INTRAMUSCULAR; INTRAVENOUS
Status: DISCONTINUED | OUTPATIENT
Start: 2023-01-01 | End: 2023-06-05 | Stop reason: HOSPADM

## 2023-01-01 RX ORDER — PROCHLORPERAZINE 25 MG
12.5 SUPPOSITORY, RECTAL RECTAL EVERY 12 HOURS PRN
Status: DISCONTINUED | OUTPATIENT
Start: 2023-01-01 | End: 2023-06-05 | Stop reason: HOSPADM

## 2023-01-01 RX ORDER — DIPHENHYDRAMINE HYDROCHLORIDE 50 MG/ML
25 INJECTION INTRAMUSCULAR; INTRAVENOUS EVERY 24 HOURS
Status: DISCONTINUED | OUTPATIENT
Start: 2023-01-01 | End: 2023-06-05 | Stop reason: HOSPADM

## 2023-01-01 RX ORDER — POLYETHYLENE GLYCOL 3350 17 G/17G
1 POWDER, FOR SOLUTION ORAL DAILY
COMMUNITY
End: 2023-01-01

## 2023-01-01 RX ORDER — ACETAMINOPHEN 325 MG/10.15ML
650 LIQUID ORAL EVERY 24 HOURS
Status: DISCONTINUED | OUTPATIENT
Start: 2023-01-01 | End: 2023-06-05 | Stop reason: HOSPADM

## 2023-01-01 RX ORDER — VECURONIUM BROMIDE 1 MG/ML
10 INJECTION, POWDER, LYOPHILIZED, FOR SOLUTION INTRAVENOUS ONCE
Status: COMPLETED | OUTPATIENT
Start: 2023-01-01 | End: 2023-01-01

## 2023-01-01 RX ORDER — FENTANYL CITRATE 50 UG/ML
100 INJECTION, SOLUTION INTRAMUSCULAR; INTRAVENOUS ONCE
Status: DISCONTINUED | OUTPATIENT
Start: 2023-01-01 | End: 2023-06-05 | Stop reason: HOSPADM

## 2023-01-01 RX ORDER — MAGNESIUM CARB/ALUMINUM HYDROX 105-160MG
148 TABLET,CHEWABLE ORAL ONCE
Status: DISCONTINUED | OUTPATIENT
Start: 2023-01-01 | End: 2023-01-01

## 2023-01-01 RX ORDER — NALOXONE HYDROCHLORIDE 0.4 MG/ML
0.2 INJECTION, SOLUTION INTRAMUSCULAR; INTRAVENOUS; SUBCUTANEOUS
Status: DISCONTINUED | OUTPATIENT
Start: 2023-01-01 | End: 2023-06-05 | Stop reason: HOSPADM

## 2023-01-01 RX ORDER — CALCIUM CHLORIDE, MAGNESIUM CHLORIDE, SODIUM CHLORIDE, SODIUM BICARBONATE, POTASSIUM CHLORIDE AND SODIUM PHOSPHATE DIBASIC DIHYDRATE 3.68; 3.05; 6.34; 3.09; .314; .187 G/L; G/L; G/L; G/L; G/L; G/L
200 INJECTION INTRAVENOUS CONTINUOUS
Status: DISCONTINUED | OUTPATIENT
Start: 2023-01-01 | End: 2023-01-01

## 2023-01-01 RX ORDER — ETOMIDATE 2 MG/ML
INJECTION INTRAVENOUS PRN
Status: DISCONTINUED | OUTPATIENT
Start: 2023-01-01 | End: 2023-01-01

## 2023-01-01 RX ORDER — SODIUM CHLORIDE 9 MG/ML
INJECTION, SOLUTION INTRAVENOUS CONTINUOUS
Status: DISCONTINUED | OUTPATIENT
Start: 2023-01-01 | End: 2023-06-05 | Stop reason: HOSPADM

## 2023-01-01 RX ORDER — CALCIUM GLUCONATE 20 MG/ML
1 INJECTION, SOLUTION INTRAVENOUS ONCE
Status: COMPLETED | OUTPATIENT
Start: 2023-01-01 | End: 2023-01-01

## 2023-01-01 RX ORDER — POLYETHYLENE GLYCOL 3350 17 G/17G
17 POWDER, FOR SOLUTION ORAL DAILY
Status: DISCONTINUED | OUTPATIENT
Start: 2023-01-01 | End: 2023-01-01

## 2023-01-01 RX ORDER — METOPROLOL TARTRATE 1 MG/ML
5 INJECTION, SOLUTION INTRAVENOUS EVERY 4 HOURS PRN
Status: DISCONTINUED | OUTPATIENT
Start: 2023-01-01 | End: 2023-06-05 | Stop reason: HOSPADM

## 2023-01-01 RX ORDER — CEFTRIAXONE 2 G/1
2 INJECTION, POWDER, FOR SOLUTION INTRAMUSCULAR; INTRAVENOUS EVERY 24 HOURS
Status: DISCONTINUED | OUTPATIENT
Start: 2023-01-01 | End: 2023-01-01

## 2023-01-01 RX ORDER — VANCOMYCIN HYDROCHLORIDE 500 MG/10ML
500 INJECTION, POWDER, LYOPHILIZED, FOR SOLUTION INTRAVENOUS
Status: DISCONTINUED | OUTPATIENT
Start: 2023-01-01 | End: 2023-01-01

## 2023-01-01 RX ORDER — SODIUM CHLORIDE 9 MG/ML
INJECTION, SOLUTION INTRAVENOUS CONTINUOUS
Status: DISCONTINUED | OUTPATIENT
Start: 2023-01-01 | End: 2023-01-01

## 2023-01-01 RX ORDER — FENTANYL CITRATE 50 UG/ML
INJECTION, SOLUTION INTRAMUSCULAR; INTRAVENOUS
Status: COMPLETED
Start: 2023-01-01 | End: 2023-01-01

## 2023-01-01 RX ORDER — MIDAZOLAM HCL IN 0.9 % NACL/PF 1 MG/ML
1-8 PLASTIC BAG, INJECTION (ML) INTRAVENOUS CONTINUOUS
Status: DISCONTINUED | OUTPATIENT
Start: 2023-01-01 | End: 2023-01-01

## 2023-01-01 RX ORDER — FENTANYL CITRATE 50 UG/ML
50 INJECTION, SOLUTION INTRAMUSCULAR; INTRAVENOUS
Status: DISCONTINUED | OUTPATIENT
Start: 2023-01-01 | End: 2023-01-01

## 2023-01-01 RX ORDER — ONDANSETRON 2 MG/ML
4 INJECTION INTRAMUSCULAR; INTRAVENOUS ONCE
Status: COMPLETED | OUTPATIENT
Start: 2023-01-01 | End: 2023-01-01

## 2023-01-01 RX ORDER — OMEPRAZOLE 40 MG/1
40 CAPSULE, DELAYED RELEASE ORAL DAILY
Qty: 30 CAPSULE | Refills: 0 | Status: SHIPPED | OUTPATIENT
Start: 2023-01-01 | End: 2023-06-16

## 2023-01-01 RX ORDER — URSODIOL 500 MG/1
500 TABLET, FILM COATED ORAL 2 TIMES DAILY
COMMUNITY

## 2023-01-01 RX ORDER — PROCHLORPERAZINE MALEATE 5 MG
5 TABLET ORAL EVERY 6 HOURS PRN
Status: DISCONTINUED | OUTPATIENT
Start: 2023-01-01 | End: 2023-06-05 | Stop reason: HOSPADM

## 2023-01-01 RX ORDER — MEROPENEM 1 G/1
1 INJECTION, POWDER, FOR SOLUTION INTRAVENOUS EVERY 8 HOURS
Status: DISCONTINUED | OUTPATIENT
Start: 2023-01-01 | End: 2023-06-05 | Stop reason: HOSPADM

## 2023-01-01 RX ORDER — NOREPINEPHRINE BITARTRATE 0.06 MG/ML
.01-.6 INJECTION, SOLUTION INTRAVENOUS CONTINUOUS
Status: DISCONTINUED | OUTPATIENT
Start: 2023-01-01 | End: 2023-01-01

## 2023-01-01 RX ORDER — OXYCODONE HYDROCHLORIDE 5 MG/1
5 TABLET ORAL EVERY 6 HOURS PRN
Qty: 12 TABLET | Refills: 0 | Status: SHIPPED | OUTPATIENT
Start: 2023-01-01 | End: 2023-01-01

## 2023-01-01 RX ORDER — NICOTINE POLACRILEX 4 MG
15-30 LOZENGE BUCCAL
Status: DISCONTINUED | OUTPATIENT
Start: 2023-01-01 | End: 2023-06-05 | Stop reason: HOSPADM

## 2023-01-01 RX ORDER — THYROID 30 MG/1
90 TABLET ORAL DAILY
Status: DISCONTINUED | OUTPATIENT
Start: 2023-01-01 | End: 2023-01-01

## 2023-01-01 RX ORDER — POTASSIUM CHLORIDE 29.8 MG/ML
20 INJECTION INTRAVENOUS EVERY 8 HOURS PRN
Status: DISCONTINUED | OUTPATIENT
Start: 2023-01-01 | End: 2023-01-01

## 2023-01-01 RX ORDER — CALCIUM CHLORIDE, MAGNESIUM CHLORIDE, SODIUM CHLORIDE, SODIUM BICARBONATE, POTASSIUM CHLORIDE AND SODIUM PHOSPHATE DIBASIC DIHYDRATE 3.68; 3.05; 6.34; 3.09; .314; .187 G/L; G/L; G/L; G/L; G/L; G/L
INJECTION INTRAVENOUS CONTINUOUS
Status: DISCONTINUED | OUTPATIENT
Start: 2023-01-01 | End: 2023-01-01 | Stop reason: ALTCHOICE

## 2023-01-01 RX ORDER — DEXTROSE MONOHYDRATE 25 G/50ML
25-50 INJECTION, SOLUTION INTRAVENOUS
Status: DISCONTINUED | OUTPATIENT
Start: 2023-01-01 | End: 2023-06-05 | Stop reason: HOSPADM

## 2023-01-01 RX ORDER — ALBUMIN (HUMAN) 12.5 G/50ML
100 SOLUTION INTRAVENOUS ONCE
Status: DISCONTINUED | OUTPATIENT
Start: 2023-01-01 | End: 2023-01-01

## 2023-01-01 RX ORDER — ACETAMINOPHEN 325 MG/1
650 TABLET ORAL EVERY 24 HOURS
Status: DISCONTINUED | OUTPATIENT
Start: 2023-01-01 | End: 2023-01-01

## 2023-01-01 RX ORDER — EPINEPHRINE IN 0.9 % SOD CHLOR 5 MG/250ML
.01-.3 PLASTIC BAG, INJECTION (ML) INTRAVENOUS CONTINUOUS
Status: DISCONTINUED | OUTPATIENT
Start: 2023-01-01 | End: 2023-01-01

## 2023-01-01 RX ORDER — DIPHENHYDRAMINE HCL 25 MG
25 CAPSULE ORAL EVERY 6 HOURS PRN
Status: DISCONTINUED | OUTPATIENT
Start: 2023-01-01 | End: 2023-06-05 | Stop reason: HOSPADM

## 2023-01-01 RX ORDER — ALBUMIN (HUMAN) 12.5 G/50ML
12.5 SOLUTION INTRAVENOUS EVERY 6 HOURS
Status: DISCONTINUED | OUTPATIENT
Start: 2023-01-01 | End: 2023-01-01

## 2023-01-01 RX ORDER — MAGNESIUM HYDROXIDE/ALUMINUM HYDROXICE/SIMETHICONE 120; 1200; 1200 MG/30ML; MG/30ML; MG/30ML
15 SUSPENSION ORAL ONCE
Status: COMPLETED | OUTPATIENT
Start: 2023-01-01 | End: 2023-01-01

## 2023-01-01 RX ORDER — ONDANSETRON 4 MG/1
4 TABLET, ORALLY DISINTEGRATING ORAL ONCE
Status: DISCONTINUED | OUTPATIENT
Start: 2023-01-01 | End: 2023-01-01

## 2023-01-01 RX ORDER — DIPHENHYDRAMINE HYDROCHLORIDE 50 MG/ML
25 INJECTION INTRAMUSCULAR; INTRAVENOUS EVERY 6 HOURS PRN
Status: DISCONTINUED | OUTPATIENT
Start: 2023-01-01 | End: 2023-06-05 | Stop reason: HOSPADM

## 2023-01-01 RX ADMIN — CALCIUM GLUCONATE 2 G: 20 INJECTION, SOLUTION INTRAVENOUS at 20:33

## 2023-01-01 RX ADMIN — CALCIUM CHLORIDE, MAGNESIUM CHLORIDE, DEXTROSE MONOHYDRATE, LACTIC ACID, SODIUM CHLORIDE, SODIUM BICARBONATE AND POTASSIUM CHLORIDE 5000 ML: 5.15; 2.03; 22; 5.4; 6.46; 3.09; .157 INJECTION INTRAVENOUS at 20:03

## 2023-01-01 RX ADMIN — HYDROCORTISONE SODIUM SUCCINATE 50 MG: 100 INJECTION, POWDER, FOR SOLUTION INTRAMUSCULAR; INTRAVENOUS at 22:51

## 2023-01-01 RX ADMIN — VASOPRESSIN 2.4 UNITS/HR: 20 INJECTION, SOLUTION INTRAMUSCULAR; SUBCUTANEOUS at 00:06

## 2023-01-01 RX ADMIN — VASOPRESSIN 2.4 UNITS/HR: 20 INJECTION, SOLUTION INTRAMUSCULAR; SUBCUTANEOUS at 20:09

## 2023-01-01 RX ADMIN — CALCIUM GLUCONATE 2 G: 20 INJECTION, SOLUTION INTRAVENOUS at 14:55

## 2023-01-01 RX ADMIN — CALCIUM CHLORIDE, MAGNESIUM CHLORIDE, SODIUM CHLORIDE, SODIUM BICARBONATE, POTASSIUM CHLORIDE AND SODIUM PHOSPHATE DIBASIC DIHYDRATE 5000 ML: 3.68; 3.05; 6.34; 3.09; .314; .187 INJECTION INTRAVENOUS at 04:45

## 2023-01-01 RX ADMIN — INSULIN ASPART 1 UNITS: 100 INJECTION, SOLUTION INTRAVENOUS; SUBCUTANEOUS at 04:07

## 2023-01-01 RX ADMIN — METOPROLOL TARTRATE 5 MG: 5 INJECTION INTRAVENOUS at 19:00

## 2023-01-01 RX ADMIN — PIPERACILLIN SODIUM AND TAZOBACTAM SODIUM 4.5 G: 4; .5 INJECTION, SOLUTION INTRAVENOUS at 08:16

## 2023-01-01 RX ADMIN — SODIUM CHLORIDE 1000 ML: 9 INJECTION, SOLUTION INTRAVENOUS at 08:51

## 2023-01-01 RX ADMIN — CALCIUM GLUCONATE 2 G: 20 INJECTION, SOLUTION INTRAVENOUS at 08:17

## 2023-01-01 RX ADMIN — URSODIOL 500 MG: 250 TABLET ORAL at 20:33

## 2023-01-01 RX ADMIN — Medication 0.8 MCG/KG/MIN: at 06:58

## 2023-01-01 RX ADMIN — INSULIN ASPART 1 UNITS: 100 INJECTION, SOLUTION INTRAVENOUS; SUBCUTANEOUS at 08:16

## 2023-01-01 RX ADMIN — HYDROCORTISONE SODIUM SUCCINATE 25 MG: 100 INJECTION, POWDER, FOR SOLUTION INTRAMUSCULAR; INTRAVENOUS at 20:08

## 2023-01-01 RX ADMIN — MEROPENEM 1 G: 1 INJECTION, POWDER, FOR SOLUTION INTRAVENOUS at 00:07

## 2023-01-01 RX ADMIN — CALCIUM CHLORIDE, MAGNESIUM CHLORIDE, SODIUM CHLORIDE, SODIUM BICARBONATE, POTASSIUM CHLORIDE AND SODIUM PHOSPHATE DIBASIC DIHYDRATE 5000 ML: 3.68; 3.05; 6.34; 3.09; .314; .187 INJECTION INTRAVENOUS at 13:18

## 2023-01-01 RX ADMIN — Medication 5 ML: at 08:49

## 2023-01-01 RX ADMIN — ALBUMIN HUMAN 12.5 G: 0.25 SOLUTION INTRAVENOUS at 00:06

## 2023-01-01 RX ADMIN — CALCIUM CHLORIDE, MAGNESIUM CHLORIDE, SODIUM CHLORIDE, SODIUM BICARBONATE, POTASSIUM CHLORIDE AND SODIUM PHOSPHATE DIBASIC DIHYDRATE 5000 ML: 3.68; 3.05; 6.34; 3.09; .314; .187 INJECTION INTRAVENOUS at 10:05

## 2023-01-01 RX ADMIN — CALCIUM CHLORIDE, MAGNESIUM CHLORIDE, DEXTROSE MONOHYDRATE, LACTIC ACID, SODIUM CHLORIDE, SODIUM BICARBONATE AND POTASSIUM CHLORIDE 5000 ML: 5.15; 2.03; 22; 5.4; 6.46; 3.09; .157 INJECTION INTRAVENOUS at 21:52

## 2023-01-01 RX ADMIN — ALBUMIN HUMAN 12.5 G: 0.25 SOLUTION INTRAVENOUS at 23:32

## 2023-01-01 RX ADMIN — MEROPENEM 1 G: 1 INJECTION, POWDER, FOR SOLUTION INTRAVENOUS at 16:33

## 2023-01-01 RX ADMIN — CALCIUM GLUCONATE 1 G: 20 INJECTION, SOLUTION INTRAVENOUS at 23:12

## 2023-01-01 RX ADMIN — PIPERACILLIN SODIUM AND TAZOBACTAM SODIUM 4.5 G: 4; .5 INJECTION, SOLUTION INTRAVENOUS at 08:04

## 2023-01-01 RX ADMIN — EPOPROSTENOL 20 NG/KG/MIN: 1.5 INJECTION, POWDER, LYOPHILIZED, FOR SOLUTION INTRAVENOUS at 13:08

## 2023-01-01 RX ADMIN — HYDROCORTISONE SODIUM SUCCINATE 50 MG: 100 INJECTION, POWDER, FOR SOLUTION INTRAMUSCULAR; INTRAVENOUS at 18:52

## 2023-01-01 RX ADMIN — CALCIUM CHLORIDE, MAGNESIUM CHLORIDE, SODIUM CHLORIDE, SODIUM BICARBONATE, POTASSIUM CHLORIDE AND SODIUM PHOSPHATE DIBASIC DIHYDRATE 5000 ML: 3.68; 3.05; 6.34; 3.09; .314; .187 INJECTION INTRAVENOUS at 01:24

## 2023-01-01 RX ADMIN — CALCIUM CHLORIDE, MAGNESIUM CHLORIDE, DEXTROSE MONOHYDRATE, LACTIC ACID, SODIUM CHLORIDE, SODIUM BICARBONATE AND POTASSIUM CHLORIDE 5000 ML: 5.15; 2.03; 22; 5.4; 6.46; 3.09; .157 INJECTION INTRAVENOUS at 10:05

## 2023-01-01 RX ADMIN — Medication 5 ML: at 08:23

## 2023-01-01 RX ADMIN — VASOPRESSIN 2.4 UNITS/HR: 20 INJECTION, SOLUTION INTRAMUSCULAR; SUBCUTANEOUS at 04:52

## 2023-01-01 RX ADMIN — INSULIN ASPART 1 UNITS: 100 INJECTION, SOLUTION INTRAVENOUS; SUBCUTANEOUS at 12:19

## 2023-01-01 RX ADMIN — Medication 0.34 MCG/KG/MIN: at 18:19

## 2023-01-01 RX ADMIN — DEXTROSE MONOHYDRATE 20 ML: 50 INJECTION, SOLUTION INTRAVENOUS at 18:31

## 2023-01-01 RX ADMIN — Medication 40 MG: at 05:58

## 2023-01-01 RX ADMIN — POTASSIUM CHLORIDE 20 MEQ: 29.8 INJECTION, SOLUTION INTRAVENOUS at 18:22

## 2023-01-01 RX ADMIN — CALCIUM CHLORIDE, MAGNESIUM CHLORIDE, DEXTROSE MONOHYDRATE, LACTIC ACID, SODIUM CHLORIDE, SODIUM BICARBONATE AND POTASSIUM CHLORIDE: 5.15; 2.03; 22; 5.4; 6.46; 3.09; .157 INJECTION INTRAVENOUS at 15:40

## 2023-01-01 RX ADMIN — VANCOMYCIN HYDROCHLORIDE 1250 MG: 10 INJECTION, POWDER, LYOPHILIZED, FOR SOLUTION INTRAVENOUS at 17:44

## 2023-01-01 RX ADMIN — LEVOTHYROXINE, LIOTHYRONINE 90 MG: 19; 4.5 TABLET ORAL at 08:42

## 2023-01-01 RX ADMIN — HYDROCORTISONE SODIUM SUCCINATE 50 MG: 100 INJECTION, POWDER, FOR SOLUTION INTRAMUSCULAR; INTRAVENOUS at 00:01

## 2023-01-01 RX ADMIN — SODIUM CHLORIDE 1000 ML: 9 INJECTION, SOLUTION INTRAVENOUS at 12:01

## 2023-01-01 RX ADMIN — CALCIUM CHLORIDE, MAGNESIUM CHLORIDE, SODIUM CHLORIDE, SODIUM BICARBONATE, POTASSIUM CHLORIDE AND SODIUM PHOSPHATE DIBASIC DIHYDRATE 5000 ML: 3.68; 3.05; 6.34; 3.09; .314; .187 INJECTION INTRAVENOUS at 11:36

## 2023-01-01 RX ADMIN — HYDROCORTISONE SODIUM SUCCINATE 50 MG: 100 INJECTION, POWDER, FOR SOLUTION INTRAMUSCULAR; INTRAVENOUS at 23:08

## 2023-01-01 RX ADMIN — FENTANYL CITRATE 100 MCG: 50 INJECTION INTRAMUSCULAR; INTRAVENOUS at 09:27

## 2023-01-01 RX ADMIN — PHENYLEPHRINE HYDROCHLORIDE 3 MCG/KG/MIN: 50 INJECTION INTRAVENOUS at 00:39

## 2023-01-01 RX ADMIN — HYDROCORTISONE SODIUM SUCCINATE 25 MG: 100 INJECTION, POWDER, FOR SOLUTION INTRAMUSCULAR; INTRAVENOUS at 23:26

## 2023-01-01 RX ADMIN — PIPERACILLIN SODIUM AND TAZOBACTAM SODIUM 4.5 G: 4; .5 INJECTION, SOLUTION INTRAVENOUS at 14:36

## 2023-01-01 RX ADMIN — CALCIUM CHLORIDE, MAGNESIUM CHLORIDE, SODIUM CHLORIDE, SODIUM BICARBONATE, POTASSIUM CHLORIDE AND SODIUM PHOSPHATE DIBASIC DIHYDRATE 5000 ML: 3.68; 3.05; 6.34; 3.09; .314; .187 INJECTION INTRAVENOUS at 00:51

## 2023-01-01 RX ADMIN — CALCIUM CHLORIDE, MAGNESIUM CHLORIDE, SODIUM CHLORIDE, SODIUM BICARBONATE, POTASSIUM CHLORIDE AND SODIUM PHOSPHATE DIBASIC DIHYDRATE 5000 ML: 3.68; 3.05; 6.34; 3.09; .314; .187 INJECTION INTRAVENOUS at 06:47

## 2023-01-01 RX ADMIN — ONDANSETRON 4 MG: 4 TABLET, ORALLY DISINTEGRATING ORAL at 08:35

## 2023-01-01 RX ADMIN — CALCIUM CHLORIDE, MAGNESIUM CHLORIDE, SODIUM CHLORIDE, SODIUM BICARBONATE, POTASSIUM CHLORIDE AND SODIUM PHOSPHATE DIBASIC DIHYDRATE 5000 ML: 3.68; 3.05; 6.34; 3.09; .314; .187 INJECTION INTRAVENOUS at 08:55

## 2023-01-01 RX ADMIN — AMPHOTERICIN B 400 MG: 50 INJECTION, POWDER, LYOPHILIZED, FOR SOLUTION INTRAVENOUS at 18:38

## 2023-01-01 RX ADMIN — INSULIN ASPART 1 UNITS: 100 INJECTION, SOLUTION INTRAVENOUS; SUBCUTANEOUS at 17:02

## 2023-01-01 RX ADMIN — ALBUMIN HUMAN 100 G: 0.25 SOLUTION INTRAVENOUS at 18:47

## 2023-01-01 RX ADMIN — SODIUM CHLORIDE 1000 ML: 9 INJECTION, SOLUTION INTRAVENOUS at 18:03

## 2023-01-01 RX ADMIN — CALCIUM GLUCONATE 2 G: 20 INJECTION, SOLUTION INTRAVENOUS at 04:47

## 2023-01-01 RX ADMIN — CALCIUM CHLORIDE, MAGNESIUM CHLORIDE, SODIUM CHLORIDE, SODIUM BICARBONATE, POTASSIUM CHLORIDE AND SODIUM PHOSPHATE DIBASIC DIHYDRATE 5000 ML: 3.68; 3.05; 6.34; 3.09; .314; .187 INJECTION INTRAVENOUS at 20:32

## 2023-01-01 RX ADMIN — HYDROCORTISONE SODIUM SUCCINATE 50 MG: 100 INJECTION, POWDER, FOR SOLUTION INTRAMUSCULAR; INTRAVENOUS at 17:40

## 2023-01-01 RX ADMIN — INSULIN ASPART 1 UNITS: 100 INJECTION, SOLUTION INTRAVENOUS; SUBCUTANEOUS at 08:06

## 2023-01-01 RX ADMIN — INSULIN ASPART 1 UNITS: 100 INJECTION, SOLUTION INTRAVENOUS; SUBCUTANEOUS at 04:41

## 2023-01-01 RX ADMIN — ALBUMIN HUMAN 12.5 G: 0.25 SOLUTION INTRAVENOUS at 00:13

## 2023-01-01 RX ADMIN — PIPERACILLIN SODIUM AND TAZOBACTAM SODIUM 4.5 G: 4; .5 INJECTION, SOLUTION INTRAVENOUS at 02:02

## 2023-01-01 RX ADMIN — CALCIUM CHLORIDE, MAGNESIUM CHLORIDE, SODIUM CHLORIDE, SODIUM BICARBONATE, POTASSIUM CHLORIDE AND SODIUM PHOSPHATE DIBASIC DIHYDRATE 5000 ML: 3.68; 3.05; 6.34; 3.09; .314; .187 INJECTION INTRAVENOUS at 14:47

## 2023-01-01 RX ADMIN — CALCIUM GLUCONATE 2 G: 20 INJECTION, SOLUTION INTRAVENOUS at 15:00

## 2023-01-01 RX ADMIN — INSULIN ASPART 1 UNITS: 100 INJECTION, SOLUTION INTRAVENOUS; SUBCUTANEOUS at 20:11

## 2023-01-01 RX ADMIN — VANCOMYCIN HYDROCHLORIDE 1500 MG: 5 INJECTION, POWDER, LYOPHILIZED, FOR SOLUTION INTRAVENOUS at 08:58

## 2023-01-01 RX ADMIN — VASOPRESSIN 2.4 UNITS/HR: 20 INJECTION, SOLUTION INTRAMUSCULAR; SUBCUTANEOUS at 02:35

## 2023-01-01 RX ADMIN — ETOMIDATE 20 MG: 40 INJECTION, SOLUTION INTRAVENOUS at 18:39

## 2023-01-01 RX ADMIN — URSODIOL 500 MG: 250 TABLET ORAL at 08:16

## 2023-01-01 RX ADMIN — EPINEPHRINE 0.14 MCG/KG/MIN: 1 INJECTION INTRAMUSCULAR; INTRAVENOUS; SUBCUTANEOUS at 02:33

## 2023-01-01 RX ADMIN — CALCIUM CHLORIDE, MAGNESIUM CHLORIDE, SODIUM CHLORIDE, SODIUM BICARBONATE, POTASSIUM CHLORIDE AND SODIUM PHOSPHATE DIBASIC DIHYDRATE 200 ML/HR: 3.68; 3.05; 6.34; 3.09; .314; .187 INJECTION INTRAVENOUS at 19:48

## 2023-01-01 RX ADMIN — CALCIUM CHLORIDE, MAGNESIUM CHLORIDE, SODIUM CHLORIDE, SODIUM BICARBONATE, POTASSIUM CHLORIDE AND SODIUM PHOSPHATE DIBASIC DIHYDRATE 5000 ML: 3.68; 3.05; 6.34; 3.09; .314; .187 INJECTION INTRAVENOUS at 01:05

## 2023-01-01 RX ADMIN — CALCIUM CHLORIDE, MAGNESIUM CHLORIDE, SODIUM CHLORIDE, SODIUM BICARBONATE, POTASSIUM CHLORIDE AND SODIUM PHOSPHATE DIBASIC DIHYDRATE 5000 ML: 3.68; 3.05; 6.34; 3.09; .314; .187 INJECTION INTRAVENOUS at 20:05

## 2023-01-01 RX ADMIN — URSODIOL 500 MG: 250 TABLET ORAL at 08:15

## 2023-01-01 RX ADMIN — INSULIN ASPART 1 UNITS: 100 INJECTION, SOLUTION INTRAVENOUS; SUBCUTANEOUS at 13:26

## 2023-01-01 RX ADMIN — CALCIUM CHLORIDE, MAGNESIUM CHLORIDE, SODIUM CHLORIDE, SODIUM BICARBONATE, POTASSIUM CHLORIDE AND SODIUM PHOSPHATE DIBASIC DIHYDRATE 200 ML/HR: 3.68; 3.05; 6.34; 3.09; .314; .187 INJECTION INTRAVENOUS at 15:29

## 2023-01-01 RX ADMIN — Medication 5 ML: at 08:14

## 2023-01-01 RX ADMIN — MEROPENEM 1 G: 1 INJECTION, POWDER, FOR SOLUTION INTRAVENOUS at 08:14

## 2023-01-01 RX ADMIN — URSODIOL 500 MG: 250 TABLET ORAL at 08:42

## 2023-01-01 RX ADMIN — HYDROCORTISONE SODIUM SUCCINATE 50 MG: 100 INJECTION, POWDER, FOR SOLUTION INTRAMUSCULAR; INTRAVENOUS at 20:27

## 2023-01-01 RX ADMIN — SODIUM CHLORIDE 1000 ML: 9 INJECTION, SOLUTION INTRAVENOUS at 12:31

## 2023-01-01 RX ADMIN — SODIUM CHLORIDE: 9 INJECTION, SOLUTION INTRAVENOUS at 14:54

## 2023-01-01 RX ADMIN — CALCIUM CHLORIDE, MAGNESIUM CHLORIDE, DEXTROSE MONOHYDRATE, LACTIC ACID, SODIUM CHLORIDE, SODIUM BICARBONATE AND POTASSIUM CHLORIDE 5000 ML: 5.15; 2.03; 22; 5.4; 6.46; 3.09; .157 INJECTION INTRAVENOUS at 16:55

## 2023-01-01 RX ADMIN — HYDROCORTISONE SODIUM SUCCINATE 50 MG: 100 INJECTION, POWDER, FOR SOLUTION INTRAMUSCULAR; INTRAVENOUS at 17:19

## 2023-01-01 RX ADMIN — CALCIUM CHLORIDE, MAGNESIUM CHLORIDE, SODIUM CHLORIDE, SODIUM BICARBONATE, POTASSIUM CHLORIDE AND SODIUM PHOSPHATE DIBASIC DIHYDRATE 5000 ML: 3.68; 3.05; 6.34; 3.09; .314; .187 INJECTION INTRAVENOUS at 20:04

## 2023-01-01 RX ADMIN — CALCIUM GLUCONATE 2 G: 20 INJECTION, SOLUTION INTRAVENOUS at 04:16

## 2023-01-01 RX ADMIN — ALBUMIN HUMAN 12.5 G: 0.25 SOLUTION INTRAVENOUS at 06:47

## 2023-01-01 RX ADMIN — PIPERACILLIN SODIUM AND TAZOBACTAM SODIUM 4.5 G: 4; .5 INJECTION, SOLUTION INTRAVENOUS at 19:41

## 2023-01-01 RX ADMIN — HYDROCORTISONE SODIUM SUCCINATE 50 MG: 100 INJECTION, POWDER, FOR SOLUTION INTRAMUSCULAR; INTRAVENOUS at 16:57

## 2023-01-01 RX ADMIN — CALCIUM CHLORIDE, MAGNESIUM CHLORIDE, DEXTROSE MONOHYDRATE, LACTIC ACID, SODIUM CHLORIDE, SODIUM BICARBONATE AND POTASSIUM CHLORIDE 5000 ML: 5.15; 2.03; 22; 5.4; 6.46; 3.09; .157 INJECTION INTRAVENOUS at 02:41

## 2023-01-01 RX ADMIN — HYDROCORTISONE SODIUM SUCCINATE 50 MG: 100 INJECTION, POWDER, FOR SOLUTION INTRAMUSCULAR; INTRAVENOUS at 04:33

## 2023-01-01 RX ADMIN — LEVOTHYROXINE, LIOTHYRONINE 90 MG: 19; 4.5 TABLET ORAL at 08:17

## 2023-01-01 RX ADMIN — MEROPENEM 1 G: 1 INJECTION, POWDER, FOR SOLUTION INTRAVENOUS at 17:06

## 2023-01-01 RX ADMIN — CALCIUM CHLORIDE, MAGNESIUM CHLORIDE, DEXTROSE MONOHYDRATE, LACTIC ACID, SODIUM CHLORIDE, SODIUM BICARBONATE AND POTASSIUM CHLORIDE 5000 ML: 5.15; 2.03; 22; 5.4; 6.46; 3.09; .157 INJECTION INTRAVENOUS at 07:56

## 2023-01-01 RX ADMIN — CALCIUM CHLORIDE, MAGNESIUM CHLORIDE, SODIUM CHLORIDE, SODIUM BICARBONATE, POTASSIUM CHLORIDE AND SODIUM PHOSPHATE DIBASIC DIHYDRATE 5000 ML: 3.68; 3.05; 6.34; 3.09; .314; .187 INJECTION INTRAVENOUS at 09:23

## 2023-01-01 RX ADMIN — HYDROCORTISONE SODIUM SUCCINATE 25 MG: 100 INJECTION, POWDER, FOR SOLUTION INTRAMUSCULAR; INTRAVENOUS at 21:27

## 2023-01-01 RX ADMIN — Medication 0.03 MCG/KG/MIN: at 09:47

## 2023-01-01 RX ADMIN — FENTANYL CITRATE 50 MCG: 50 INJECTION INTRAMUSCULAR; INTRAVENOUS at 13:19

## 2023-01-01 RX ADMIN — ACETAMINOPHEN 650 MG: 325 SOLUTION ORAL at 17:54

## 2023-01-01 RX ADMIN — CALCIUM CHLORIDE, MAGNESIUM CHLORIDE, SODIUM CHLORIDE, SODIUM BICARBONATE, POTASSIUM CHLORIDE AND SODIUM PHOSPHATE DIBASIC DIHYDRATE 200 ML/HR: 3.68; 3.05; 6.34; 3.09; .314; .187 INJECTION INTRAVENOUS at 11:36

## 2023-01-01 RX ADMIN — PIPERACILLIN SODIUM AND TAZOBACTAM SODIUM 4.5 G: 4; .5 INJECTION, SOLUTION INTRAVENOUS at 02:08

## 2023-01-01 RX ADMIN — DEXTROSE MONOHYDRATE 50 ML: 25 INJECTION, SOLUTION INTRAVENOUS at 09:29

## 2023-01-01 RX ADMIN — ALBUMIN HUMAN 100 G: 0.25 SOLUTION INTRAVENOUS at 15:08

## 2023-01-01 RX ADMIN — HYDROCORTISONE SODIUM SUCCINATE 25 MG: 100 INJECTION, POWDER, FOR SOLUTION INTRAMUSCULAR; INTRAVENOUS at 14:10

## 2023-01-01 RX ADMIN — CALCIUM CHLORIDE, MAGNESIUM CHLORIDE, SODIUM CHLORIDE, SODIUM BICARBONATE, POTASSIUM CHLORIDE AND SODIUM PHOSPHATE DIBASIC DIHYDRATE 5000 ML: 3.68; 3.05; 6.34; 3.09; .314; .187 INJECTION INTRAVENOUS at 19:49

## 2023-01-01 RX ADMIN — CALCIUM CHLORIDE, MAGNESIUM CHLORIDE, DEXTROSE MONOHYDRATE, LACTIC ACID, SODIUM CHLORIDE, SODIUM BICARBONATE AND POTASSIUM CHLORIDE 5000 ML: 5.15; 2.03; 22; 5.4; 6.46; 3.09; .157 INJECTION INTRAVENOUS at 05:35

## 2023-01-01 RX ADMIN — VASOPRESSIN 2.4 UNITS/HR: 20 INJECTION, SOLUTION INTRAMUSCULAR; SUBCUTANEOUS at 11:50

## 2023-01-01 RX ADMIN — CALCIUM CHLORIDE, MAGNESIUM CHLORIDE, DEXTROSE MONOHYDRATE, LACTIC ACID, SODIUM CHLORIDE, SODIUM BICARBONATE AND POTASSIUM CHLORIDE 5000 ML: 5.15; 2.03; 22; 5.4; 6.46; 3.09; .157 INJECTION INTRAVENOUS at 02:43

## 2023-01-01 RX ADMIN — CALCIUM CHLORIDE, MAGNESIUM CHLORIDE, DEXTROSE MONOHYDRATE, LACTIC ACID, SODIUM CHLORIDE, SODIUM BICARBONATE AND POTASSIUM CHLORIDE 5000 ML: 5.15; 2.03; 22; 5.4; 6.46; 3.09; .157 INJECTION INTRAVENOUS at 21:34

## 2023-01-01 RX ADMIN — INSULIN ASPART 1 UNITS: 100 INJECTION, SOLUTION INTRAVENOUS; SUBCUTANEOUS at 16:10

## 2023-01-01 RX ADMIN — CALCIUM CHLORIDE, MAGNESIUM CHLORIDE, SODIUM CHLORIDE, SODIUM BICARBONATE, POTASSIUM CHLORIDE AND SODIUM PHOSPHATE DIBASIC DIHYDRATE 5000 ML: 3.68; 3.05; 6.34; 3.09; .314; .187 INJECTION INTRAVENOUS at 13:53

## 2023-01-01 RX ADMIN — INSULIN ASPART 1 UNITS: 100 INJECTION, SOLUTION INTRAVENOUS; SUBCUTANEOUS at 20:08

## 2023-01-01 RX ADMIN — ONDANSETRON 4 MG: 2 INJECTION INTRAMUSCULAR; INTRAVENOUS at 10:56

## 2023-01-01 RX ADMIN — ALBUMIN HUMAN 12.5 G: 0.25 SOLUTION INTRAVENOUS at 18:13

## 2023-01-01 RX ADMIN — CALCIUM CHLORIDE, MAGNESIUM CHLORIDE, SODIUM CHLORIDE, SODIUM BICARBONATE, POTASSIUM CHLORIDE AND SODIUM PHOSPHATE DIBASIC DIHYDRATE 5000 ML: 3.68; 3.05; 6.34; 3.09; .314; .187 INJECTION INTRAVENOUS at 16:36

## 2023-01-01 RX ADMIN — CALCIUM CHLORIDE, MAGNESIUM CHLORIDE, SODIUM CHLORIDE, SODIUM BICARBONATE, POTASSIUM CHLORIDE AND SODIUM PHOSPHATE DIBASIC DIHYDRATE 5000 ML: 3.68; 3.05; 6.34; 3.09; .314; .187 INJECTION INTRAVENOUS at 12:06

## 2023-01-01 RX ADMIN — HYDROCORTISONE SODIUM SUCCINATE 50 MG: 100 INJECTION, POWDER, FOR SOLUTION INTRAMUSCULAR; INTRAVENOUS at 05:11

## 2023-01-01 RX ADMIN — PIPERACILLIN SODIUM AND TAZOBACTAM SODIUM 4.5 G: 4; .5 INJECTION, SOLUTION INTRAVENOUS at 13:28

## 2023-01-01 RX ADMIN — Medication 5 ML: at 14:14

## 2023-01-01 RX ADMIN — PIPERACILLIN SODIUM AND TAZOBACTAM SODIUM 4.5 G: 4; .5 INJECTION, SOLUTION INTRAVENOUS at 19:52

## 2023-01-01 RX ADMIN — CALCIUM CHLORIDE, MAGNESIUM CHLORIDE, DEXTROSE MONOHYDRATE, LACTIC ACID, SODIUM CHLORIDE, SODIUM BICARBONATE AND POTASSIUM CHLORIDE 5000 ML: 5.15; 2.03; 22; 5.4; 6.46; 3.09; .157 INJECTION INTRAVENOUS at 13:14

## 2023-01-01 RX ADMIN — Medication 40 MG: at 08:07

## 2023-01-01 RX ADMIN — CALCIUM CHLORIDE, MAGNESIUM CHLORIDE, SODIUM CHLORIDE, SODIUM BICARBONATE, POTASSIUM CHLORIDE AND SODIUM PHOSPHATE DIBASIC DIHYDRATE 5000 ML: 3.68; 3.05; 6.34; 3.09; .314; .187 INJECTION INTRAVENOUS at 17:02

## 2023-01-01 RX ADMIN — INSULIN ASPART 1 UNITS: 100 INJECTION, SOLUTION INTRAVENOUS; SUBCUTANEOUS at 00:48

## 2023-01-01 RX ADMIN — CALCIUM CHLORIDE, MAGNESIUM CHLORIDE, SODIUM CHLORIDE, SODIUM BICARBONATE, POTASSIUM CHLORIDE AND SODIUM PHOSPHATE DIBASIC DIHYDRATE 5000 ML: 3.68; 3.05; 6.34; 3.09; .314; .187 INJECTION INTRAVENOUS at 04:40

## 2023-01-01 RX ADMIN — DEXTROSE MONOHYDRATE 50 ML: 25 INJECTION, SOLUTION INTRAVENOUS at 08:49

## 2023-01-01 RX ADMIN — Medication 0.04 MCG/KG/MIN: at 16:43

## 2023-01-01 RX ADMIN — SODIUM BICARBONATE: 84 INJECTION, SOLUTION INTRAVENOUS at 10:28

## 2023-01-01 RX ADMIN — VASOPRESSIN 2.4 UNITS/HR: 20 INJECTION, SOLUTION INTRAMUSCULAR; SUBCUTANEOUS at 09:09

## 2023-01-01 RX ADMIN — SODIUM BICARBONATE 50 MEQ: 84 INJECTION, SOLUTION INTRAVENOUS at 08:55

## 2023-01-01 RX ADMIN — CALCIUM CHLORIDE, MAGNESIUM CHLORIDE, DEXTROSE MONOHYDRATE, LACTIC ACID, SODIUM CHLORIDE, SODIUM BICARBONATE AND POTASSIUM CHLORIDE 5000 ML: 5.15; 2.03; 22; 5.4; 6.46; 3.09; .157 INJECTION INTRAVENOUS at 23:38

## 2023-01-01 RX ADMIN — LIDOCAINE HYDROCHLORIDE 10 ML: 10 INJECTION, SOLUTION EPIDURAL; INFILTRATION; INTRACAUDAL; PERINEURAL at 14:21

## 2023-01-01 RX ADMIN — HYDROCORTISONE SODIUM SUCCINATE 50 MG: 100 INJECTION, POWDER, FOR SOLUTION INTRAMUSCULAR; INTRAVENOUS at 06:01

## 2023-01-01 RX ADMIN — PHENYLEPHRINE HYDROCHLORIDE 6 MCG/KG/MIN: 50 INJECTION INTRAVENOUS at 06:53

## 2023-01-01 RX ADMIN — CALCIUM CHLORIDE, MAGNESIUM CHLORIDE, SODIUM CHLORIDE, SODIUM BICARBONATE, POTASSIUM CHLORIDE AND SODIUM PHOSPHATE DIBASIC DIHYDRATE 200 ML/HR: 3.68; 3.05; 6.34; 3.09; .314; .187 INJECTION INTRAVENOUS at 12:50

## 2023-01-01 RX ADMIN — CALCIUM CHLORIDE, MAGNESIUM CHLORIDE, DEXTROSE MONOHYDRATE, LACTIC ACID, SODIUM CHLORIDE, SODIUM BICARBONATE AND POTASSIUM CHLORIDE 5000 ML: 5.15; 2.03; 22; 5.4; 6.46; 3.09; .157 INJECTION INTRAVENOUS at 05:36

## 2023-01-01 RX ADMIN — CALCIUM CHLORIDE, MAGNESIUM CHLORIDE, SODIUM CHLORIDE, SODIUM BICARBONATE, POTASSIUM CHLORIDE AND SODIUM PHOSPHATE DIBASIC DIHYDRATE 5000 ML: 3.68; 3.05; 6.34; 3.09; .314; .187 INJECTION INTRAVENOUS at 10:07

## 2023-01-01 RX ADMIN — INSULIN ASPART 1 UNITS: 100 INJECTION, SOLUTION INTRAVENOUS; SUBCUTANEOUS at 15:53

## 2023-01-01 RX ADMIN — CALCIUM GLUCONATE 2 G: 20 INJECTION, SOLUTION INTRAVENOUS at 21:11

## 2023-01-01 RX ADMIN — HYDROCORTISONE SODIUM SUCCINATE 50 MG: 100 INJECTION, POWDER, FOR SOLUTION INTRAMUSCULAR; INTRAVENOUS at 04:49

## 2023-01-01 RX ADMIN — SODIUM BICARBONATE: 84 INJECTION, SOLUTION INTRAVENOUS at 00:43

## 2023-01-01 RX ADMIN — VASOPRESSIN 2.4 UNITS/HR: 20 INJECTION, SOLUTION INTRAMUSCULAR; SUBCUTANEOUS at 18:38

## 2023-01-01 RX ADMIN — PHENYLEPHRINE HYDROCHLORIDE 6 MCG/KG/MIN: 50 INJECTION INTRAVENOUS at 02:47

## 2023-01-01 RX ADMIN — PROPOFOL 20 MCG/KG/MIN: 10 INJECTION, EMULSION INTRAVENOUS at 18:05

## 2023-01-01 RX ADMIN — Medication 5 ML: at 08:04

## 2023-01-01 RX ADMIN — CALCIUM CHLORIDE, MAGNESIUM CHLORIDE, SODIUM CHLORIDE, SODIUM BICARBONATE, POTASSIUM CHLORIDE AND SODIUM PHOSPHATE DIBASIC DIHYDRATE 5000 ML: 3.68; 3.05; 6.34; 3.09; .314; .187 INJECTION INTRAVENOUS at 06:16

## 2023-01-01 RX ADMIN — CALCIUM CHLORIDE, MAGNESIUM CHLORIDE, SODIUM CHLORIDE, SODIUM BICARBONATE, POTASSIUM CHLORIDE AND SODIUM PHOSPHATE DIBASIC DIHYDRATE 5000 ML: 3.68; 3.05; 6.34; 3.09; .314; .187 INJECTION INTRAVENOUS at 15:13

## 2023-01-01 RX ADMIN — HYDROCORTISONE SODIUM SUCCINATE 50 MG: 100 INJECTION, POWDER, FOR SOLUTION INTRAMUSCULAR; INTRAVENOUS at 12:40

## 2023-01-01 RX ADMIN — ALBUMIN HUMAN 12.5 G: 0.25 SOLUTION INTRAVENOUS at 05:10

## 2023-01-01 RX ADMIN — LEVOTHYROXINE, LIOTHYRONINE 90 MG: 19; 4.5 TABLET ORAL at 11:41

## 2023-01-01 RX ADMIN — CALCIUM GLUCONATE 2 G: 20 INJECTION, SOLUTION INTRAVENOUS at 06:20

## 2023-01-01 RX ADMIN — CALCIUM CHLORIDE, MAGNESIUM CHLORIDE, SODIUM CHLORIDE, SODIUM BICARBONATE, POTASSIUM CHLORIDE AND SODIUM PHOSPHATE DIBASIC DIHYDRATE 5000 ML: 3.68; 3.05; 6.34; 3.09; .314; .187 INJECTION INTRAVENOUS at 19:00

## 2023-01-01 RX ADMIN — SODIUM CHLORIDE: 9 INJECTION, SOLUTION INTRAVENOUS at 12:00

## 2023-01-01 RX ADMIN — SODIUM BICARBONATE 35 MEQ/HR: 84 INJECTION, SOLUTION INTRAVENOUS at 15:06

## 2023-01-01 RX ADMIN — MEROPENEM 1 G: 1 INJECTION, POWDER, FOR SOLUTION INTRAVENOUS at 08:18

## 2023-01-01 RX ADMIN — DEXTROSE MONOHYDRATE 20 ML: 50 INJECTION, SOLUTION INTRAVENOUS at 20:42

## 2023-01-01 RX ADMIN — Medication 40 MG: at 08:23

## 2023-01-01 RX ADMIN — ALBUMIN HUMAN 12.5 G: 0.25 SOLUTION INTRAVENOUS at 05:51

## 2023-01-01 RX ADMIN — CALCIUM CHLORIDE, MAGNESIUM CHLORIDE, DEXTROSE MONOHYDRATE, LACTIC ACID, SODIUM CHLORIDE, SODIUM BICARBONATE AND POTASSIUM CHLORIDE 5000 ML: 5.15; 2.03; 22; 5.4; 6.46; 3.09; .157 INJECTION INTRAVENOUS at 21:59

## 2023-01-01 RX ADMIN — MAGNESIUM SULFATE HEPTAHYDRATE 2 G: 2 INJECTION, SOLUTION INTRAVENOUS at 21:39

## 2023-01-01 RX ADMIN — PANTOPRAZOLE SODIUM 40 MG: 40 INJECTION, POWDER, FOR SOLUTION INTRAVENOUS at 08:26

## 2023-01-01 RX ADMIN — CALCIUM CHLORIDE, MAGNESIUM CHLORIDE, SODIUM CHLORIDE, SODIUM BICARBONATE, POTASSIUM CHLORIDE AND SODIUM PHOSPHATE DIBASIC DIHYDRATE 5000 ML: 3.68; 3.05; 6.34; 3.09; .314; .187 INJECTION INTRAVENOUS at 18:52

## 2023-01-01 RX ADMIN — INSULIN ASPART 1 UNITS: 100 INJECTION, SOLUTION INTRAVENOUS; SUBCUTANEOUS at 13:19

## 2023-01-01 RX ADMIN — CALCIUM CHLORIDE, MAGNESIUM CHLORIDE, SODIUM CHLORIDE, SODIUM BICARBONATE, POTASSIUM CHLORIDE AND SODIUM PHOSPHATE DIBASIC DIHYDRATE 5000 ML: 3.68; 3.05; 6.34; 3.09; .314; .187 INJECTION INTRAVENOUS at 22:14

## 2023-01-01 RX ADMIN — CALCIUM CHLORIDE, MAGNESIUM CHLORIDE, SODIUM CHLORIDE, SODIUM BICARBONATE, POTASSIUM CHLORIDE AND SODIUM PHOSPHATE DIBASIC DIHYDRATE 5000 ML: 3.68; 3.05; 6.34; 3.09; .314; .187 INJECTION INTRAVENOUS at 14:44

## 2023-01-01 RX ADMIN — HYDROXYZINE HYDROCHLORIDE 25 MG: 25 TABLET, FILM COATED ORAL at 15:06

## 2023-01-01 RX ADMIN — CALCIUM CHLORIDE, MAGNESIUM CHLORIDE, SODIUM CHLORIDE, SODIUM BICARBONATE, POTASSIUM CHLORIDE AND SODIUM PHOSPHATE DIBASIC DIHYDRATE 5000 ML: 3.68; 3.05; 6.34; 3.09; .314; .187 INJECTION INTRAVENOUS at 20:30

## 2023-01-01 RX ADMIN — CALCIUM CHLORIDE, MAGNESIUM CHLORIDE, DEXTROSE MONOHYDRATE, LACTIC ACID, SODIUM CHLORIDE, SODIUM BICARBONATE AND POTASSIUM CHLORIDE 5000 ML: 5.15; 2.03; 22; 5.4; 6.46; 3.09; .157 INJECTION INTRAVENOUS at 08:25

## 2023-01-01 RX ADMIN — CALCIUM CHLORIDE, MAGNESIUM CHLORIDE, SODIUM CHLORIDE, SODIUM BICARBONATE, POTASSIUM CHLORIDE AND SODIUM PHOSPHATE DIBASIC DIHYDRATE 5000 ML: 3.68; 3.05; 6.34; 3.09; .314; .187 INJECTION INTRAVENOUS at 19:48

## 2023-01-01 RX ADMIN — INSULIN ASPART 1 UNITS: 100 INJECTION, SOLUTION INTRAVENOUS; SUBCUTANEOUS at 20:04

## 2023-01-01 RX ADMIN — Medication 0.6 MCG/KG/MIN: at 20:54

## 2023-01-01 RX ADMIN — CALCIUM GLUCONATE 2 G: 20 INJECTION, SOLUTION INTRAVENOUS at 15:19

## 2023-01-01 RX ADMIN — SODIUM CHLORIDE 1000 ML: 9 INJECTION, SOLUTION INTRAVENOUS at 22:16

## 2023-01-01 RX ADMIN — DEXTROSE AND SODIUM CHLORIDE: 5; 900 INJECTION, SOLUTION INTRAVENOUS at 13:38

## 2023-01-01 RX ADMIN — PIPERACILLIN SODIUM AND TAZOBACTAM SODIUM 4.5 G: 4; .5 INJECTION, SOLUTION INTRAVENOUS at 01:41

## 2023-01-01 RX ADMIN — Medication 0.6 MCG/KG/MIN: at 08:17

## 2023-01-01 RX ADMIN — TAZOBACTAM SODIUM AND PIPERACILLIN SODIUM 2.25 G: 250; 2 INJECTION, SOLUTION INTRAVENOUS at 20:40

## 2023-01-01 RX ADMIN — PIPERACILLIN SODIUM AND TAZOBACTAM SODIUM 4.5 G: 4; .5 INJECTION, SOLUTION INTRAVENOUS at 20:02

## 2023-01-01 RX ADMIN — VASOPRESSIN 2.4 UNITS/HR: 20 INJECTION, SOLUTION INTRAMUSCULAR; SUBCUTANEOUS at 14:12

## 2023-01-01 RX ADMIN — DEXTROSE MONOHYDRATE 25 G: 25 INJECTION, SOLUTION INTRAVENOUS at 12:11

## 2023-01-01 RX ADMIN — CALCIUM CHLORIDE, MAGNESIUM CHLORIDE, DEXTROSE MONOHYDRATE, LACTIC ACID, SODIUM CHLORIDE, SODIUM BICARBONATE AND POTASSIUM CHLORIDE 5000 ML: 5.15; 2.03; 22; 5.4; 6.46; 3.09; .157 INJECTION INTRAVENOUS at 15:22

## 2023-01-01 RX ADMIN — SODIUM BICARBONATE 50 MEQ: 84 INJECTION INTRAVENOUS at 06:30

## 2023-01-01 RX ADMIN — CALCIUM CHLORIDE, MAGNESIUM CHLORIDE, SODIUM CHLORIDE, SODIUM BICARBONATE, POTASSIUM CHLORIDE AND SODIUM PHOSPHATE DIBASIC DIHYDRATE 5000 ML: 3.68; 3.05; 6.34; 3.09; .314; .187 INJECTION INTRAVENOUS at 01:28

## 2023-01-01 RX ADMIN — LEVOTHYROXINE, LIOTHYRONINE 90 MG: 19; 4.5 TABLET ORAL at 08:16

## 2023-01-01 RX ADMIN — PHENYLEPHRINE HYDROCHLORIDE 0.5 MCG/KG/MIN: 50 INJECTION INTRAVENOUS at 20:22

## 2023-01-01 RX ADMIN — DEXTROSE MONOHYDRATE: 100 INJECTION, SOLUTION INTRAVENOUS at 11:05

## 2023-01-01 RX ADMIN — PIPERACILLIN SODIUM AND TAZOBACTAM SODIUM 4.5 G: 4; .5 INJECTION, SOLUTION INTRAVENOUS at 14:27

## 2023-01-01 RX ADMIN — CALCIUM CHLORIDE, MAGNESIUM CHLORIDE, SODIUM CHLORIDE, SODIUM BICARBONATE, POTASSIUM CHLORIDE AND SODIUM PHOSPHATE DIBASIC DIHYDRATE 5000 ML: 3.68; 3.05; 6.34; 3.09; .314; .187 INJECTION INTRAVENOUS at 07:21

## 2023-01-01 RX ADMIN — Medication 5 ML: at 08:07

## 2023-01-01 RX ADMIN — SODIUM PHOSPHATE, MONOBASIC, MONOHYDRATE AND SODIUM PHOSPHATE, DIBASIC, ANHYDROUS 15 MMOL: 142; 276 INJECTION, SOLUTION INTRAVENOUS at 15:58

## 2023-01-01 RX ADMIN — EPOPROSTENOL 20 NG/KG/MIN: 1.5 INJECTION, POWDER, LYOPHILIZED, FOR SOLUTION INTRAVENOUS at 04:03

## 2023-01-01 RX ADMIN — CALCIUM CHLORIDE, MAGNESIUM CHLORIDE, SODIUM CHLORIDE, SODIUM BICARBONATE, POTASSIUM CHLORIDE AND SODIUM PHOSPHATE DIBASIC DIHYDRATE 5000 ML: 3.68; 3.05; 6.34; 3.09; .314; .187 INJECTION INTRAVENOUS at 10:04

## 2023-01-01 RX ADMIN — Medication 5 ML: at 09:20

## 2023-01-01 RX ADMIN — CALCIUM CHLORIDE, MAGNESIUM CHLORIDE, SODIUM CHLORIDE, SODIUM BICARBONATE, POTASSIUM CHLORIDE AND SODIUM PHOSPHATE DIBASIC DIHYDRATE 5000 ML: 3.68; 3.05; 6.34; 3.09; .314; .187 INJECTION INTRAVENOUS at 03:41

## 2023-01-01 RX ADMIN — VANCOMYCIN HYDROCHLORIDE 1500 MG: 10 INJECTION, POWDER, LYOPHILIZED, FOR SOLUTION INTRAVENOUS at 18:20

## 2023-01-01 RX ADMIN — Medication 0.15 MCG/KG/MIN: at 05:15

## 2023-01-01 RX ADMIN — CALCIUM CHLORIDE, MAGNESIUM CHLORIDE, SODIUM CHLORIDE, SODIUM BICARBONATE, POTASSIUM CHLORIDE AND SODIUM PHOSPHATE DIBASIC DIHYDRATE 5000 ML: 3.68; 3.05; 6.34; 3.09; .314; .187 INJECTION INTRAVENOUS at 01:41

## 2023-01-01 RX ADMIN — SODIUM BICARBONATE: 84 INJECTION, SOLUTION INTRAVENOUS at 20:28

## 2023-01-01 RX ADMIN — SODIUM BICARBONATE: 84 INJECTION, SOLUTION INTRAVENOUS at 05:15

## 2023-01-01 RX ADMIN — CALCIUM CHLORIDE, MAGNESIUM CHLORIDE, SODIUM CHLORIDE, SODIUM BICARBONATE, POTASSIUM CHLORIDE AND SODIUM PHOSPHATE DIBASIC DIHYDRATE 5000 ML: 3.68; 3.05; 6.34; 3.09; .314; .187 INJECTION INTRAVENOUS at 06:43

## 2023-01-01 RX ADMIN — HYDROCORTISONE SODIUM SUCCINATE 50 MG: 100 INJECTION, POWDER, FOR SOLUTION INTRAMUSCULAR; INTRAVENOUS at 05:58

## 2023-01-01 RX ADMIN — SODIUM CHLORIDE 1000 ML: 9 INJECTION, SOLUTION INTRAVENOUS at 19:49

## 2023-01-01 RX ADMIN — INSULIN ASPART 1 UNITS: 100 INJECTION, SOLUTION INTRAVENOUS; SUBCUTANEOUS at 19:53

## 2023-01-01 RX ADMIN — CALCIUM CHLORIDE, MAGNESIUM CHLORIDE, SODIUM CHLORIDE, SODIUM BICARBONATE, POTASSIUM CHLORIDE AND SODIUM PHOSPHATE DIBASIC DIHYDRATE 5000 ML: 3.68; 3.05; 6.34; 3.09; .314; .187 INJECTION INTRAVENOUS at 07:24

## 2023-01-01 RX ADMIN — HYDROCORTISONE SODIUM SUCCINATE 50 MG: 100 INJECTION, POWDER, FOR SOLUTION INTRAMUSCULAR; INTRAVENOUS at 08:49

## 2023-01-01 RX ADMIN — EPINEPHRINE 0.11 MCG/KG/MIN: 1 INJECTION INTRAMUSCULAR; INTRAVENOUS; SUBCUTANEOUS at 04:28

## 2023-01-01 RX ADMIN — CALCIUM CHLORIDE, MAGNESIUM CHLORIDE, SODIUM CHLORIDE, SODIUM BICARBONATE, POTASSIUM CHLORIDE AND SODIUM PHOSPHATE DIBASIC DIHYDRATE 5000 ML: 3.68; 3.05; 6.34; 3.09; .314; .187 INJECTION INTRAVENOUS at 22:16

## 2023-01-01 RX ADMIN — CALCIUM CHLORIDE, MAGNESIUM CHLORIDE, DEXTROSE MONOHYDRATE, LACTIC ACID, SODIUM CHLORIDE, SODIUM BICARBONATE AND POTASSIUM CHLORIDE 5000 ML: 5.15; 2.03; 22; 5.4; 6.46; 3.09; .157 INJECTION INTRAVENOUS at 03:09

## 2023-01-01 RX ADMIN — PIPERACILLIN SODIUM AND TAZOBACTAM SODIUM 4.5 G: 4; .5 INJECTION, SOLUTION INTRAVENOUS at 20:46

## 2023-01-01 RX ADMIN — HYDROCORTISONE SODIUM SUCCINATE 50 MG: 100 INJECTION, POWDER, FOR SOLUTION INTRAMUSCULAR; INTRAVENOUS at 10:52

## 2023-01-01 RX ADMIN — INSULIN ASPART 1 UNITS: 100 INJECTION, SOLUTION INTRAVENOUS; SUBCUTANEOUS at 20:30

## 2023-01-01 RX ADMIN — CALCIUM CHLORIDE, MAGNESIUM CHLORIDE, SODIUM CHLORIDE, SODIUM BICARBONATE, POTASSIUM CHLORIDE AND SODIUM PHOSPHATE DIBASIC DIHYDRATE 5000 ML: 3.68; 3.05; 6.34; 3.09; .314; .187 INJECTION INTRAVENOUS at 16:37

## 2023-01-01 RX ADMIN — Medication 5 ML: at 08:24

## 2023-01-01 RX ADMIN — ALBUMIN HUMAN 12.5 G: 0.25 SOLUTION INTRAVENOUS at 19:04

## 2023-01-01 RX ADMIN — CALCIUM CHLORIDE, MAGNESIUM CHLORIDE, SODIUM CHLORIDE, SODIUM BICARBONATE, POTASSIUM CHLORIDE AND SODIUM PHOSPHATE DIBASIC DIHYDRATE 5000 ML: 3.68; 3.05; 6.34; 3.09; .314; .187 INJECTION INTRAVENOUS at 00:50

## 2023-01-01 RX ADMIN — CALCIUM CHLORIDE, MAGNESIUM CHLORIDE, DEXTROSE MONOHYDRATE, LACTIC ACID, SODIUM CHLORIDE, SODIUM BICARBONATE AND POTASSIUM CHLORIDE 5000 ML: 5.15; 2.03; 22; 5.4; 6.46; 3.09; .157 INJECTION INTRAVENOUS at 22:02

## 2023-01-01 RX ADMIN — URSODIOL 500 MG: 250 TABLET ORAL at 21:09

## 2023-01-01 RX ADMIN — CALCIUM CHLORIDE, MAGNESIUM CHLORIDE, SODIUM CHLORIDE, SODIUM BICARBONATE, POTASSIUM CHLORIDE AND SODIUM PHOSPHATE DIBASIC DIHYDRATE 5000 ML: 3.68; 3.05; 6.34; 3.09; .314; .187 INJECTION INTRAVENOUS at 09:00

## 2023-01-01 RX ADMIN — CALCIUM CHLORIDE, MAGNESIUM CHLORIDE, SODIUM CHLORIDE, SODIUM BICARBONATE, POTASSIUM CHLORIDE AND SODIUM PHOSPHATE DIBASIC DIHYDRATE 5000 ML: 3.68; 3.05; 6.34; 3.09; .314; .187 INJECTION INTRAVENOUS at 02:45

## 2023-01-01 RX ADMIN — URSODIOL 500 MG: 250 TABLET ORAL at 21:00

## 2023-01-01 RX ADMIN — INSULIN ASPART 1 UNITS: 100 INJECTION, SOLUTION INTRAVENOUS; SUBCUTANEOUS at 00:19

## 2023-01-01 RX ADMIN — PIPERACILLIN SODIUM AND TAZOBACTAM SODIUM 4.5 G: 4; .5 INJECTION, SOLUTION INTRAVENOUS at 01:09

## 2023-01-01 RX ADMIN — VASOPRESSIN 2.4 UNITS/HR: 20 INJECTION, SOLUTION INTRAMUSCULAR; SUBCUTANEOUS at 22:04

## 2023-01-01 RX ADMIN — VANCOMYCIN HYDROCHLORIDE 1500 MG: 10 INJECTION, POWDER, LYOPHILIZED, FOR SOLUTION INTRAVENOUS at 20:23

## 2023-01-01 RX ADMIN — PIPERACILLIN SODIUM AND TAZOBACTAM SODIUM 4.5 G: 4; .5 INJECTION, SOLUTION INTRAVENOUS at 09:18

## 2023-01-01 RX ADMIN — CALCIUM GLUCONATE 2 G: 20 INJECTION, SOLUTION INTRAVENOUS at 21:35

## 2023-01-01 RX ADMIN — CALCIUM CHLORIDE, MAGNESIUM CHLORIDE, DEXTROSE MONOHYDRATE, LACTIC ACID, SODIUM CHLORIDE, SODIUM BICARBONATE AND POTASSIUM CHLORIDE 5000 ML: 5.15; 2.03; 22; 5.4; 6.46; 3.09; .157 INJECTION INTRAVENOUS at 03:08

## 2023-01-01 RX ADMIN — HYDROCORTISONE SODIUM SUCCINATE 50 MG: 100 INJECTION, POWDER, FOR SOLUTION INTRAMUSCULAR; INTRAVENOUS at 00:12

## 2023-01-01 RX ADMIN — PHENYLEPHRINE HYDROCHLORIDE 6 MCG/KG/MIN: 50 INJECTION INTRAVENOUS at 00:48

## 2023-01-01 RX ADMIN — HYDROCORTISONE SODIUM SUCCINATE 50 MG: 100 INJECTION, POWDER, FOR SOLUTION INTRAMUSCULAR; INTRAVENOUS at 23:16

## 2023-01-01 RX ADMIN — HYDROCORTISONE SODIUM SUCCINATE 50 MG: 100 INJECTION, POWDER, FOR SOLUTION INTRAMUSCULAR; INTRAVENOUS at 11:41

## 2023-01-01 RX ADMIN — Medication 0.6 MCG/KG/MIN: at 02:35

## 2023-01-01 RX ADMIN — VASOPRESSIN 2.4 UNITS/HR: 20 INJECTION, SOLUTION INTRAMUSCULAR; SUBCUTANEOUS at 06:26

## 2023-01-01 RX ADMIN — CALCIUM CHLORIDE, MAGNESIUM CHLORIDE, DEXTROSE MONOHYDRATE, LACTIC ACID, SODIUM CHLORIDE, SODIUM BICARBONATE AND POTASSIUM CHLORIDE 5000 ML: 5.15; 2.03; 22; 5.4; 6.46; 3.09; .157 INJECTION INTRAVENOUS at 04:23

## 2023-01-01 RX ADMIN — PROPOFOL 20 MCG/KG/MIN: 10 INJECTION, EMULSION INTRAVENOUS at 23:05

## 2023-01-01 RX ADMIN — HYDROCORTISONE SODIUM SUCCINATE 50 MG: 100 INJECTION, POWDER, FOR SOLUTION INTRAMUSCULAR; INTRAVENOUS at 17:05

## 2023-01-01 RX ADMIN — CALCIUM CHLORIDE, MAGNESIUM CHLORIDE, DEXTROSE MONOHYDRATE, LACTIC ACID, SODIUM CHLORIDE, SODIUM BICARBONATE AND POTASSIUM CHLORIDE 5000 ML: 5.15; 2.03; 22; 5.4; 6.46; 3.09; .157 INJECTION INTRAVENOUS at 03:12

## 2023-01-01 RX ADMIN — EPINEPHRINE 0.01 MCG/KG/MIN: 1 INJECTION INTRAMUSCULAR; INTRAVENOUS; SUBCUTANEOUS at 19:36

## 2023-01-01 RX ADMIN — CALCIUM CHLORIDE, MAGNESIUM CHLORIDE, DEXTROSE MONOHYDRATE, LACTIC ACID, SODIUM CHLORIDE, SODIUM BICARBONATE AND POTASSIUM CHLORIDE: 5.15; 2.03; 22; 5.4; 6.46; 3.09; .157 INJECTION INTRAVENOUS at 16:00

## 2023-01-01 RX ADMIN — HYDROCORTISONE SODIUM SUCCINATE 50 MG: 100 INJECTION, POWDER, FOR SOLUTION INTRAMUSCULAR; INTRAVENOUS at 12:29

## 2023-01-01 RX ADMIN — PROPOFOL 20 MCG/KG/MIN: 10 INJECTION, EMULSION INTRAVENOUS at 08:41

## 2023-01-01 RX ADMIN — CALCIUM CHLORIDE, MAGNESIUM CHLORIDE, SODIUM CHLORIDE, SODIUM BICARBONATE, POTASSIUM CHLORIDE AND SODIUM PHOSPHATE DIBASIC DIHYDRATE 5000 ML: 3.68; 3.05; 6.34; 3.09; .314; .187 INJECTION INTRAVENOUS at 18:05

## 2023-01-01 RX ADMIN — PIPERACILLIN SODIUM AND TAZOBACTAM SODIUM 4.5 G: 4; .5 INJECTION, SOLUTION INTRAVENOUS at 08:22

## 2023-01-01 RX ADMIN — PANTOPRAZOLE SODIUM 40 MG: 40 INJECTION, POWDER, FOR SOLUTION INTRAVENOUS at 08:18

## 2023-01-01 RX ADMIN — SODIUM BICARBONATE: 84 INJECTION, SOLUTION INTRAVENOUS at 15:45

## 2023-01-01 RX ADMIN — CALCIUM CHLORIDE, MAGNESIUM CHLORIDE, DEXTROSE MONOHYDRATE, LACTIC ACID, SODIUM CHLORIDE, SODIUM BICARBONATE AND POTASSIUM CHLORIDE 5000 ML: 5.15; 2.03; 22; 5.4; 6.46; 3.09; .157 INJECTION INTRAVENOUS at 13:40

## 2023-01-01 RX ADMIN — CALCIUM CHLORIDE, MAGNESIUM CHLORIDE, SODIUM CHLORIDE, SODIUM BICARBONATE, POTASSIUM CHLORIDE AND SODIUM PHOSPHATE DIBASIC DIHYDRATE 5000 ML: 3.68; 3.05; 6.34; 3.09; .314; .187 INJECTION INTRAVENOUS at 17:07

## 2023-01-01 RX ADMIN — HYDROCORTISONE SODIUM SUCCINATE 50 MG: 100 INJECTION, POWDER, FOR SOLUTION INTRAMUSCULAR; INTRAVENOUS at 02:17

## 2023-01-01 RX ADMIN — CALCIUM CHLORIDE, MAGNESIUM CHLORIDE, DEXTROSE MONOHYDRATE, LACTIC ACID, SODIUM CHLORIDE, SODIUM BICARBONATE AND POTASSIUM CHLORIDE 5000 ML: 5.15; 2.03; 22; 5.4; 6.46; 3.09; .157 INJECTION INTRAVENOUS at 08:23

## 2023-01-01 RX ADMIN — CALCIUM GLUCONATE 1 G: 20 INJECTION, SOLUTION INTRAVENOUS at 05:11

## 2023-01-01 RX ADMIN — VASOPRESSIN 2.4 UNITS/HR: 20 INJECTION, SOLUTION INTRAMUSCULAR; SUBCUTANEOUS at 21:20

## 2023-01-01 RX ADMIN — LEVOTHYROXINE, LIOTHYRONINE 90 MG: 19; 4.5 TABLET ORAL at 08:18

## 2023-01-01 RX ADMIN — INSULIN ASPART 1 UNITS: 100 INJECTION, SOLUTION INTRAVENOUS; SUBCUTANEOUS at 17:00

## 2023-01-01 RX ADMIN — PHENYLEPHRINE HYDROCHLORIDE 3 MCG/KG/MIN: 50 INJECTION INTRAVENOUS at 10:37

## 2023-01-01 RX ADMIN — POLYETHYLENE GLYCOL 3350 17 G: 17 POWDER, FOR SOLUTION ORAL at 14:40

## 2023-01-01 RX ADMIN — PHENYLEPHRINE HYDROCHLORIDE 6 MCG/KG/MIN: 50 INJECTION INTRAVENOUS at 08:49

## 2023-01-01 RX ADMIN — VASOPRESSIN 2.4 UNITS/HR: 20 INJECTION, SOLUTION INTRAMUSCULAR; SUBCUTANEOUS at 09:51

## 2023-01-01 RX ADMIN — DEXTROSE AND SODIUM CHLORIDE: 10; .45 INJECTION, SOLUTION INTRAVENOUS at 18:56

## 2023-01-01 RX ADMIN — LEVOTHYROXINE, LIOTHYRONINE 90 MG: 19; 4.5 TABLET ORAL at 08:23

## 2023-01-01 RX ADMIN — HYDROCORTISONE SODIUM SUCCINATE 50 MG: 100 INJECTION, POWDER, FOR SOLUTION INTRAMUSCULAR; INTRAVENOUS at 11:06

## 2023-01-01 RX ADMIN — CALCIUM CHLORIDE, MAGNESIUM CHLORIDE, DEXTROSE MONOHYDRATE, LACTIC ACID, SODIUM CHLORIDE, SODIUM BICARBONATE AND POTASSIUM CHLORIDE 5000 ML: 5.15; 2.03; 22; 5.4; 6.46; 3.09; .157 INJECTION INTRAVENOUS at 08:19

## 2023-01-01 RX ADMIN — VASOPRESSIN 2.4 UNITS/HR: 20 INJECTION, SOLUTION INTRAMUSCULAR; SUBCUTANEOUS at 12:07

## 2023-01-01 RX ADMIN — CALCIUM CHLORIDE, MAGNESIUM CHLORIDE, DEXTROSE MONOHYDRATE, LACTIC ACID, SODIUM CHLORIDE, SODIUM BICARBONATE AND POTASSIUM CHLORIDE 5000 ML: 5.15; 2.03; 22; 5.4; 6.46; 3.09; .157 INJECTION INTRAVENOUS at 17:06

## 2023-01-01 RX ADMIN — CALCIUM CHLORIDE, MAGNESIUM CHLORIDE, SODIUM CHLORIDE, SODIUM BICARBONATE, POTASSIUM CHLORIDE AND SODIUM PHOSPHATE DIBASIC DIHYDRATE 5000 ML: 3.68; 3.05; 6.34; 3.09; .314; .187 INJECTION INTRAVENOUS at 18:51

## 2023-01-01 RX ADMIN — CALCIUM CHLORIDE, MAGNESIUM CHLORIDE, SODIUM CHLORIDE, SODIUM BICARBONATE, POTASSIUM CHLORIDE AND SODIUM PHOSPHATE DIBASIC DIHYDRATE 5000 ML: 3.68; 3.05; 6.34; 3.09; .314; .187 INJECTION INTRAVENOUS at 12:16

## 2023-01-01 RX ADMIN — CALCIUM CHLORIDE, MAGNESIUM CHLORIDE, SODIUM CHLORIDE, SODIUM BICARBONATE, POTASSIUM CHLORIDE AND SODIUM PHOSPHATE DIBASIC DIHYDRATE 5000 ML: 3.68; 3.05; 6.34; 3.09; .314; .187 INJECTION INTRAVENOUS at 01:23

## 2023-01-01 RX ADMIN — TAZOBACTAM SODIUM AND PIPERACILLIN SODIUM 2.25 G: 250; 2 INJECTION, SOLUTION INTRAVENOUS at 08:19

## 2023-01-01 RX ADMIN — VANCOMYCIN HYDROCHLORIDE 1250 MG: 10 INJECTION, POWDER, LYOPHILIZED, FOR SOLUTION INTRAVENOUS at 16:26

## 2023-01-01 RX ADMIN — PIPERACILLIN SODIUM AND TAZOBACTAM SODIUM 4.5 G: 4; .5 INJECTION, SOLUTION INTRAVENOUS at 19:56

## 2023-01-01 RX ADMIN — CALCIUM CHLORIDE, MAGNESIUM CHLORIDE, SODIUM CHLORIDE, SODIUM BICARBONATE, POTASSIUM CHLORIDE AND SODIUM PHOSPHATE DIBASIC DIHYDRATE 5000 ML: 3.68; 3.05; 6.34; 3.09; .314; .187 INJECTION INTRAVENOUS at 12:09

## 2023-01-01 RX ADMIN — INSULIN ASPART 1 UNITS: 100 INJECTION, SOLUTION INTRAVENOUS; SUBCUTANEOUS at 08:24

## 2023-01-01 RX ADMIN — Medication 0.58 MCG/KG/MIN: at 14:16

## 2023-01-01 RX ADMIN — ALBUMIN HUMAN 12.5 G: 0.25 SOLUTION INTRAVENOUS at 12:12

## 2023-01-01 RX ADMIN — CALCIUM CHLORIDE, MAGNESIUM CHLORIDE, DEXTROSE MONOHYDRATE, LACTIC ACID, SODIUM CHLORIDE, SODIUM BICARBONATE AND POTASSIUM CHLORIDE 5000 ML: 5.15; 2.03; 22; 5.4; 6.46; 3.09; .157 INJECTION INTRAVENOUS at 13:13

## 2023-01-01 RX ADMIN — CALCIUM CHLORIDE, MAGNESIUM CHLORIDE, SODIUM CHLORIDE, SODIUM BICARBONATE, POTASSIUM CHLORIDE AND SODIUM PHOSPHATE DIBASIC DIHYDRATE 5000 ML: 3.68; 3.05; 6.34; 3.09; .314; .187 INJECTION INTRAVENOUS at 03:53

## 2023-01-01 RX ADMIN — INSULIN ASPART 1 UNITS: 100 INJECTION, SOLUTION INTRAVENOUS; SUBCUTANEOUS at 16:15

## 2023-01-01 RX ADMIN — Medication 0.13 MCG/KG/MIN: at 23:24

## 2023-01-01 RX ADMIN — INSULIN ASPART 1 UNITS: 100 INJECTION, SOLUTION INTRAVENOUS; SUBCUTANEOUS at 23:55

## 2023-01-01 RX ADMIN — SODIUM BICARBONATE: 84 INJECTION, SOLUTION INTRAVENOUS at 15:27

## 2023-01-01 RX ADMIN — HUMAN ALBUMIN MICROSPHERES AND PERFLUTREN 3 ML: 10; .22 INJECTION, SOLUTION INTRAVENOUS at 08:44

## 2023-01-01 RX ADMIN — HYDROCORTISONE SODIUM SUCCINATE 50 MG: 100 INJECTION, POWDER, FOR SOLUTION INTRAMUSCULAR; INTRAVENOUS at 18:13

## 2023-01-01 RX ADMIN — SODIUM CHLORIDE 1000 ML: 9 INJECTION, SOLUTION INTRAVENOUS at 12:12

## 2023-01-01 RX ADMIN — SODIUM CHLORIDE 1000 ML: 9 INJECTION, SOLUTION INTRAVENOUS at 11:44

## 2023-01-01 RX ADMIN — EPINEPHRINE 0.09 MCG/KG/MIN: 1 INJECTION INTRAMUSCULAR; INTRAVENOUS; SUBCUTANEOUS at 05:17

## 2023-01-01 RX ADMIN — VASOPRESSIN 2.4 UNITS/HR: 20 INJECTION, SOLUTION INTRAMUSCULAR; SUBCUTANEOUS at 16:22

## 2023-01-01 RX ADMIN — DEXTROSE MONOHYDRATE 50 ML: 25 INJECTION, SOLUTION INTRAVENOUS at 16:32

## 2023-01-01 RX ADMIN — Medication 0.6 MCG/KG/MIN: at 19:27

## 2023-01-01 RX ADMIN — Medication 40 MG: at 08:49

## 2023-01-01 RX ADMIN — CALCIUM CHLORIDE, MAGNESIUM CHLORIDE, SODIUM CHLORIDE, SODIUM BICARBONATE, POTASSIUM CHLORIDE AND SODIUM PHOSPHATE DIBASIC DIHYDRATE 5000 ML: 3.68; 3.05; 6.34; 3.09; .314; .187 INJECTION INTRAVENOUS at 01:06

## 2023-01-01 RX ADMIN — SODIUM CHLORIDE, POTASSIUM CHLORIDE, SODIUM LACTATE AND CALCIUM CHLORIDE 1000 ML: 600; 310; 30; 20 INJECTION, SOLUTION INTRAVENOUS at 22:44

## 2023-01-01 RX ADMIN — VASOPRESSIN 2.4 UNITS/HR: 20 INJECTION, SOLUTION INTRAMUSCULAR; SUBCUTANEOUS at 01:01

## 2023-01-01 RX ADMIN — TAZOBACTAM SODIUM AND PIPERACILLIN SODIUM 2.25 G: 250; 2 INJECTION, SOLUTION INTRAVENOUS at 00:51

## 2023-01-01 RX ADMIN — POTASSIUM CHLORIDE 20 MEQ: 29.8 INJECTION, SOLUTION INTRAVENOUS at 14:52

## 2023-01-01 RX ADMIN — PHYTONADIONE 5 MG: 10 INJECTION, EMULSION INTRAMUSCULAR; INTRAVENOUS; SUBCUTANEOUS at 14:17

## 2023-01-01 RX ADMIN — URSODIOL 500 MG: 250 TABLET ORAL at 20:50

## 2023-01-01 RX ADMIN — CALCIUM CHLORIDE, MAGNESIUM CHLORIDE, SODIUM CHLORIDE, SODIUM BICARBONATE, POTASSIUM CHLORIDE AND SODIUM PHOSPHATE DIBASIC DIHYDRATE 5000 ML: 3.68; 3.05; 6.34; 3.09; .314; .187 INJECTION INTRAVENOUS at 22:32

## 2023-01-01 RX ADMIN — URSODIOL 500 MG: 250 TABLET ORAL at 09:43

## 2023-01-01 RX ADMIN — HYDROCORTISONE SODIUM SUCCINATE 50 MG: 100 INJECTION, POWDER, FOR SOLUTION INTRAMUSCULAR; INTRAVENOUS at 05:17

## 2023-01-01 RX ADMIN — EPINEPHRINE 0.09 MCG/KG/MIN: 1 INJECTION INTRAMUSCULAR; INTRAVENOUS; SUBCUTANEOUS at 05:06

## 2023-01-01 RX ADMIN — Medication 0.1 MCG/KG/MIN: at 18:26

## 2023-01-01 RX ADMIN — Medication 0.22 MCG/KG/MIN: at 06:42

## 2023-01-01 RX ADMIN — HYDROCORTISONE SODIUM SUCCINATE 100 MG: 100 INJECTION, POWDER, FOR SOLUTION INTRAMUSCULAR; INTRAVENOUS at 17:25

## 2023-01-01 RX ADMIN — SODIUM BICARBONATE: 84 INJECTION, SOLUTION INTRAVENOUS at 06:33

## 2023-01-01 RX ADMIN — HYDROCORTISONE SODIUM SUCCINATE 25 MG: 100 INJECTION, POWDER, FOR SOLUTION INTRAMUSCULAR; INTRAVENOUS at 06:02

## 2023-01-01 RX ADMIN — PIPERACILLIN SODIUM AND TAZOBACTAM SODIUM 4.5 G: 4; .5 INJECTION, SOLUTION INTRAVENOUS at 14:21

## 2023-01-01 RX ADMIN — POTASSIUM CHLORIDE 20 MEQ: 29.8 INJECTION, SOLUTION INTRAVENOUS at 06:57

## 2023-01-01 RX ADMIN — ALBUMIN HUMAN 12.5 G: 0.25 SOLUTION INTRAVENOUS at 13:26

## 2023-01-01 RX ADMIN — CIPROFLOXACIN HYDROCHLORIDE 500 MG: 500 TABLET, FILM COATED ORAL at 09:28

## 2023-01-01 RX ADMIN — Medication 40 MG: at 08:04

## 2023-01-01 RX ADMIN — TAZOBACTAM SODIUM AND PIPERACILLIN SODIUM 2.25 G: 250; 2 INJECTION, SOLUTION INTRAVENOUS at 19:35

## 2023-01-01 RX ADMIN — CALCIUM CHLORIDE, MAGNESIUM CHLORIDE, DEXTROSE MONOHYDRATE, LACTIC ACID, SODIUM CHLORIDE, SODIUM BICARBONATE AND POTASSIUM CHLORIDE: 5.15; 2.03; 22; 5.4; 6.46; 3.09; .157 INJECTION INTRAVENOUS at 21:26

## 2023-01-01 RX ADMIN — CALCIUM CHLORIDE, MAGNESIUM CHLORIDE, DEXTROSE MONOHYDRATE, LACTIC ACID, SODIUM CHLORIDE, SODIUM BICARBONATE AND POTASSIUM CHLORIDE 5000 ML: 5.15; 2.03; 22; 5.4; 6.46; 3.09; .157 INJECTION INTRAVENOUS at 03:10

## 2023-01-01 RX ADMIN — SODIUM BICARBONATE 50 MEQ: 84 INJECTION INTRAVENOUS at 08:55

## 2023-01-01 RX ADMIN — Medication 0.3 MCG/KG/MIN: at 07:06

## 2023-01-01 RX ADMIN — TAZOBACTAM SODIUM AND PIPERACILLIN SODIUM 3.38 G: 375; 3 INJECTION, SOLUTION INTRAVENOUS at 11:56

## 2023-01-01 RX ADMIN — URSODIOL 500 MG: 250 TABLET ORAL at 21:39

## 2023-01-01 RX ADMIN — SODIUM BICARBONATE: 84 INJECTION, SOLUTION INTRAVENOUS at 00:54

## 2023-01-01 RX ADMIN — PROCHLORPERAZINE EDISYLATE 5 MG: 5 INJECTION INTRAMUSCULAR; INTRAVENOUS at 14:59

## 2023-01-01 RX ADMIN — CALCIUM CHLORIDE, MAGNESIUM CHLORIDE, SODIUM CHLORIDE, SODIUM BICARBONATE, POTASSIUM CHLORIDE AND SODIUM PHOSPHATE DIBASIC DIHYDRATE 200 ML/HR: 3.68; 3.05; 6.34; 3.09; .314; .187 INJECTION INTRAVENOUS at 14:38

## 2023-01-01 RX ADMIN — URSODIOL 500 MG: 250 TABLET ORAL at 08:07

## 2023-01-01 RX ADMIN — CALCIUM CHLORIDE, MAGNESIUM CHLORIDE, DEXTROSE MONOHYDRATE, LACTIC ACID, SODIUM CHLORIDE, SODIUM BICARBONATE AND POTASSIUM CHLORIDE 5000 ML: 5.15; 2.03; 22; 5.4; 6.46; 3.09; .157 INJECTION INTRAVENOUS at 15:26

## 2023-01-01 RX ADMIN — CALCIUM GLUCONATE 2 G: 20 INJECTION, SOLUTION INTRAVENOUS at 13:40

## 2023-01-01 RX ADMIN — CALCIUM CHLORIDE, MAGNESIUM CHLORIDE, SODIUM CHLORIDE, SODIUM BICARBONATE, POTASSIUM CHLORIDE AND SODIUM PHOSPHATE DIBASIC DIHYDRATE 5000 ML: 3.68; 3.05; 6.34; 3.09; .314; .187 INJECTION INTRAVENOUS at 15:24

## 2023-01-01 RX ADMIN — TAZOBACTAM SODIUM AND PIPERACILLIN SODIUM 2.25 G: 250; 2 INJECTION, SOLUTION INTRAVENOUS at 06:00

## 2023-01-01 RX ADMIN — Medication 0.12 MCG/KG/MIN: at 11:42

## 2023-01-01 RX ADMIN — SODIUM CHLORIDE: 9 INJECTION, SOLUTION INTRAVENOUS at 21:55

## 2023-01-01 RX ADMIN — CALCIUM CHLORIDE, MAGNESIUM CHLORIDE, SODIUM CHLORIDE, SODIUM BICARBONATE, POTASSIUM CHLORIDE AND SODIUM PHOSPHATE DIBASIC DIHYDRATE 5000 ML: 3.68; 3.05; 6.34; 3.09; .314; .187 INJECTION INTRAVENOUS at 15:16

## 2023-01-01 RX ADMIN — POTASSIUM CHLORIDE 20 MEQ: 29.8 INJECTION, SOLUTION INTRAVENOUS at 00:44

## 2023-01-01 RX ADMIN — PANTOPRAZOLE SODIUM 40 MG: 40 INJECTION, POWDER, FOR SOLUTION INTRAVENOUS at 08:16

## 2023-01-01 RX ADMIN — CALCIUM CHLORIDE, MAGNESIUM CHLORIDE, SODIUM CHLORIDE, SODIUM BICARBONATE, POTASSIUM CHLORIDE AND SODIUM PHOSPHATE DIBASIC DIHYDRATE 5000 ML: 3.68; 3.05; 6.34; 3.09; .314; .187 INJECTION INTRAVENOUS at 22:31

## 2023-01-01 RX ADMIN — VASOPRESSIN 2.4 UNITS/HR: 20 INJECTION, SOLUTION INTRAMUSCULAR; SUBCUTANEOUS at 08:45

## 2023-01-01 RX ADMIN — HYDROCORTISONE SODIUM SUCCINATE 50 MG: 100 INJECTION, POWDER, FOR SOLUTION INTRAMUSCULAR; INTRAVENOUS at 18:19

## 2023-01-01 RX ADMIN — PANTOPRAZOLE SODIUM 40 MG: 40 INJECTION, POWDER, FOR SOLUTION INTRAVENOUS at 08:02

## 2023-01-01 RX ADMIN — PROPOFOL 10 MCG/KG/MIN: 10 INJECTION, EMULSION INTRAVENOUS at 05:10

## 2023-01-01 RX ADMIN — PIPERACILLIN SODIUM AND TAZOBACTAM SODIUM 4.5 G: 4; .5 INJECTION, SOLUTION INTRAVENOUS at 08:29

## 2023-01-01 RX ADMIN — VASOPRESSIN 2.4 UNITS/HR: 20 INJECTION, SOLUTION INTRAMUSCULAR; SUBCUTANEOUS at 20:35

## 2023-01-01 RX ADMIN — SODIUM CHLORIDE 1000 ML: 9 INJECTION, SOLUTION INTRAVENOUS at 16:44

## 2023-01-01 RX ADMIN — MIDAZOLAM HYDROCHLORIDE 2 MG/HR: 1 INJECTION, SOLUTION INTRAVENOUS at 06:09

## 2023-01-01 RX ADMIN — CALCIUM CHLORIDE, MAGNESIUM CHLORIDE, SODIUM CHLORIDE, SODIUM BICARBONATE, POTASSIUM CHLORIDE AND SODIUM PHOSPHATE DIBASIC DIHYDRATE 5000 ML: 3.68; 3.05; 6.34; 3.09; .314; .187 INJECTION INTRAVENOUS at 18:08

## 2023-01-01 RX ADMIN — CALCIUM CHLORIDE, MAGNESIUM CHLORIDE, SODIUM CHLORIDE, SODIUM BICARBONATE, POTASSIUM CHLORIDE AND SODIUM PHOSPHATE DIBASIC DIHYDRATE 5000 ML: 3.68; 3.05; 6.34; 3.09; .314; .187 INJECTION INTRAVENOUS at 03:44

## 2023-01-01 RX ADMIN — Medication 25 MCG/HR: at 21:48

## 2023-01-01 RX ADMIN — LEVOTHYROXINE, LIOTHYRONINE 90 MG: 19; 4.5 TABLET ORAL at 08:24

## 2023-01-01 RX ADMIN — HYDROCORTISONE SODIUM SUCCINATE 25 MG: 100 INJECTION, POWDER, FOR SOLUTION INTRAMUSCULAR; INTRAVENOUS at 14:55

## 2023-01-01 RX ADMIN — HYDROCORTISONE SODIUM SUCCINATE 50 MG: 100 INJECTION, POWDER, FOR SOLUTION INTRAMUSCULAR; INTRAVENOUS at 00:52

## 2023-01-01 RX ADMIN — VECURONIUM BROMIDE 10 MG: 1 INJECTION, POWDER, LYOPHILIZED, FOR SOLUTION INTRAVENOUS at 03:37

## 2023-01-01 RX ADMIN — VASOPRESSIN 2.4 UNITS/HR: 20 INJECTION, SOLUTION INTRAMUSCULAR; SUBCUTANEOUS at 11:36

## 2023-01-01 RX ADMIN — Medication 60 MG: at 18:45

## 2023-01-01 RX ADMIN — PHENYLEPHRINE HYDROCHLORIDE 6 MCG/KG/MIN: 50 INJECTION INTRAVENOUS at 04:43

## 2023-01-01 RX ADMIN — CALCIUM CHLORIDE, MAGNESIUM CHLORIDE, SODIUM CHLORIDE, SODIUM BICARBONATE, POTASSIUM CHLORIDE AND SODIUM PHOSPHATE DIBASIC DIHYDRATE 200 ML/HR: 3.68; 3.05; 6.34; 3.09; .314; .187 INJECTION INTRAVENOUS at 15:09

## 2023-01-01 RX ADMIN — INSULIN ASPART 1 UNITS: 100 INJECTION, SOLUTION INTRAVENOUS; SUBCUTANEOUS at 00:07

## 2023-01-01 RX ADMIN — Medication 0.14 MCG/KG/MIN: at 14:14

## 2023-01-01 RX ADMIN — HYDROCORTISONE SODIUM SUCCINATE 50 MG: 100 INJECTION, POWDER, FOR SOLUTION INTRAMUSCULAR; INTRAVENOUS at 11:34

## 2023-01-01 RX ADMIN — MEROPENEM 1 G: 1 INJECTION, POWDER, FOR SOLUTION INTRAVENOUS at 08:45

## 2023-01-01 RX ADMIN — TAZOBACTAM SODIUM AND PIPERACILLIN SODIUM 2.25 G: 250; 2 INJECTION, SOLUTION INTRAVENOUS at 00:52

## 2023-01-01 RX ADMIN — TOBRAMYCIN SULFATE 180 MG: 40 INJECTION, SOLUTION INTRAMUSCULAR; INTRAVENOUS at 07:43

## 2023-01-01 RX ADMIN — VASOPRESSIN 2.4 UNITS/HR: 20 INJECTION, SOLUTION INTRAMUSCULAR; SUBCUTANEOUS at 13:06

## 2023-01-01 RX ADMIN — PIPERACILLIN SODIUM AND TAZOBACTAM SODIUM 4.5 G: 4; .5 INJECTION, SOLUTION INTRAVENOUS at 14:18

## 2023-01-01 RX ADMIN — HYDROCORTISONE SODIUM SUCCINATE 50 MG: 100 INJECTION, POWDER, FOR SOLUTION INTRAMUSCULAR; INTRAVENOUS at 12:48

## 2023-01-01 RX ADMIN — DEXTROSE MONOHYDRATE 50 ML: 25 INJECTION, SOLUTION INTRAVENOUS at 04:17

## 2023-01-01 RX ADMIN — PHYTONADIONE 5 MG: 10 INJECTION, EMULSION INTRAMUSCULAR; INTRAVENOUS; SUBCUTANEOUS at 14:23

## 2023-01-01 RX ADMIN — INSULIN ASPART 1 UNITS: 100 INJECTION, SOLUTION INTRAVENOUS; SUBCUTANEOUS at 12:43

## 2023-01-01 RX ADMIN — HYDROCORTISONE SODIUM SUCCINATE 25 MG: 100 INJECTION, POWDER, FOR SOLUTION INTRAMUSCULAR; INTRAVENOUS at 08:14

## 2023-01-01 RX ADMIN — LEVOTHYROXINE, LIOTHYRONINE 90 MG: 19; 4.5 TABLET ORAL at 08:05

## 2023-01-01 RX ADMIN — MEROPENEM 1 G: 1 INJECTION, POWDER, FOR SOLUTION INTRAVENOUS at 23:25

## 2023-01-01 RX ADMIN — PIPERACILLIN SODIUM AND TAZOBACTAM SODIUM 4.5 G: 4; .5 INJECTION, SOLUTION INTRAVENOUS at 14:15

## 2023-01-01 RX ADMIN — SODIUM CHLORIDE 1000 ML: 9 INJECTION, SOLUTION INTRAVENOUS at 12:07

## 2023-01-01 RX ADMIN — Medication 5 ML: at 08:16

## 2023-01-01 RX ADMIN — CALCIUM CHLORIDE, MAGNESIUM CHLORIDE, SODIUM CHLORIDE, SODIUM BICARBONATE, POTASSIUM CHLORIDE AND SODIUM PHOSPHATE DIBASIC DIHYDRATE 200 ML/HR: 3.68; 3.05; 6.34; 3.09; .314; .187 INJECTION INTRAVENOUS at 00:50

## 2023-01-01 RX ADMIN — HYDROCORTISONE SODIUM SUCCINATE 50 MG: 100 INJECTION, POWDER, FOR SOLUTION INTRAMUSCULAR; INTRAVENOUS at 05:24

## 2023-01-01 RX ADMIN — CALCIUM CHLORIDE, MAGNESIUM CHLORIDE, SODIUM CHLORIDE, SODIUM BICARBONATE, POTASSIUM CHLORIDE AND SODIUM PHOSPHATE DIBASIC DIHYDRATE 5000 ML: 3.68; 3.05; 6.34; 3.09; .314; .187 INJECTION INTRAVENOUS at 12:07

## 2023-01-01 RX ADMIN — ALBUMIN HUMAN 12.5 G: 0.25 SOLUTION INTRAVENOUS at 13:23

## 2023-01-01 RX ADMIN — VASOPRESSIN 2.4 UNITS/HR: 20 INJECTION, SOLUTION INTRAMUSCULAR; SUBCUTANEOUS at 00:04

## 2023-01-01 RX ADMIN — PIPERACILLIN SODIUM AND TAZOBACTAM SODIUM 4.5 G: 4; .5 INJECTION, SOLUTION INTRAVENOUS at 01:37

## 2023-01-01 RX ADMIN — CALCIUM CHLORIDE, MAGNESIUM CHLORIDE, DEXTROSE MONOHYDRATE, LACTIC ACID, SODIUM CHLORIDE, SODIUM BICARBONATE AND POTASSIUM CHLORIDE 5000 ML: 5.15; 2.03; 22; 5.4; 6.46; 3.09; .157 INJECTION INTRAVENOUS at 16:40

## 2023-01-01 RX ADMIN — CEFTRIAXONE 2 G: 2 INJECTION, POWDER, FOR SOLUTION INTRAMUSCULAR; INTRAVENOUS at 16:38

## 2023-01-01 RX ADMIN — VASOPRESSIN 2.4 UNITS/HR: 20 INJECTION, SOLUTION INTRAMUSCULAR; SUBCUTANEOUS at 04:48

## 2023-01-01 RX ADMIN — CALCIUM CHLORIDE, MAGNESIUM CHLORIDE, SODIUM CHLORIDE, SODIUM BICARBONATE, POTASSIUM CHLORIDE AND SODIUM PHOSPHATE DIBASIC DIHYDRATE 5000 ML: 3.68; 3.05; 6.34; 3.09; .314; .187 INJECTION INTRAVENOUS at 09:20

## 2023-01-01 RX ADMIN — CALCIUM CHLORIDE, MAGNESIUM CHLORIDE, DEXTROSE MONOHYDRATE, LACTIC ACID, SODIUM CHLORIDE, SODIUM BICARBONATE AND POTASSIUM CHLORIDE: 5.15; 2.03; 22; 5.4; 6.46; 3.09; .157 INJECTION INTRAVENOUS at 15:41

## 2023-01-01 RX ADMIN — Medication 0.08 MCG/KG/MIN: at 12:35

## 2023-01-01 RX ADMIN — HYDROCORTISONE SODIUM SUCCINATE 50 MG: 100 INJECTION, POWDER, FOR SOLUTION INTRAMUSCULAR; INTRAVENOUS at 00:20

## 2023-01-01 RX ADMIN — CALCIUM CHLORIDE, MAGNESIUM CHLORIDE, DEXTROSE MONOHYDRATE, LACTIC ACID, SODIUM CHLORIDE, SODIUM BICARBONATE AND POTASSIUM CHLORIDE 5000 ML: 5.15; 2.03; 22; 5.4; 6.46; 3.09; .157 INJECTION INTRAVENOUS at 11:27

## 2023-01-01 RX ADMIN — CALCIUM CHLORIDE, MAGNESIUM CHLORIDE, SODIUM CHLORIDE, SODIUM BICARBONATE, POTASSIUM CHLORIDE AND SODIUM PHOSPHATE DIBASIC DIHYDRATE 5000 ML: 3.68; 3.05; 6.34; 3.09; .314; .187 INJECTION INTRAVENOUS at 15:25

## 2023-01-01 RX ADMIN — MAGNESIUM SULFATE HEPTAHYDRATE 2 G: 2 INJECTION, SOLUTION INTRAVENOUS at 13:21

## 2023-01-01 RX ADMIN — CALCIUM CHLORIDE, MAGNESIUM CHLORIDE, SODIUM CHLORIDE, SODIUM BICARBONATE, POTASSIUM CHLORIDE AND SODIUM PHOSPHATE DIBASIC DIHYDRATE 5000 ML: 3.68; 3.05; 6.34; 3.09; .314; .187 INJECTION INTRAVENOUS at 23:10

## 2023-01-01 RX ADMIN — ALBUMIN HUMAN 12.5 G: 0.25 SOLUTION INTRAVENOUS at 12:22

## 2023-01-01 RX ADMIN — VASOPRESSIN 2.4 UNITS/HR: 20 INJECTION, SOLUTION INTRAMUSCULAR; SUBCUTANEOUS at 18:10

## 2023-01-01 RX ADMIN — TAZOBACTAM SODIUM AND PIPERACILLIN SODIUM 2.25 G: 250; 2 INJECTION, SOLUTION INTRAVENOUS at 13:48

## 2023-01-01 RX ADMIN — HYDROCORTISONE SODIUM SUCCINATE 50 MG: 100 INJECTION, POWDER, FOR SOLUTION INTRAMUSCULAR; INTRAVENOUS at 23:55

## 2023-01-01 RX ADMIN — HYDROCORTISONE SODIUM SUCCINATE 50 MG: 100 INJECTION, POWDER, FOR SOLUTION INTRAMUSCULAR; INTRAVENOUS at 23:09

## 2023-01-01 RX ADMIN — HYDROCORTISONE SODIUM SUCCINATE 50 MG: 100 INJECTION, POWDER, FOR SOLUTION INTRAMUSCULAR; INTRAVENOUS at 01:48

## 2023-01-01 RX ADMIN — HYDROCORTISONE SODIUM SUCCINATE 50 MG: 100 INJECTION, POWDER, FOR SOLUTION INTRAMUSCULAR; INTRAVENOUS at 08:45

## 2023-01-01 RX ADMIN — CALCIUM GLUCONATE 2 G: 20 INJECTION, SOLUTION INTRAVENOUS at 14:57

## 2023-01-01 RX ADMIN — SODIUM BICARBONATE: 84 INJECTION, SOLUTION INTRAVENOUS at 21:47

## 2023-01-01 RX ADMIN — SODIUM BICARBONATE: 84 INJECTION, SOLUTION INTRAVENOUS at 20:15

## 2023-01-01 RX ADMIN — PIPERACILLIN SODIUM AND TAZOBACTAM SODIUM 4.5 G: 4; .5 INJECTION, SOLUTION INTRAVENOUS at 08:24

## 2023-01-01 RX ADMIN — ALUMINUM HYDROXIDE, MAGNESIUM HYDROXIDE, AND DIMETHICONE 15 ML: 200; 20; 200 SUSPENSION ORAL at 19:57

## 2023-01-01 RX ADMIN — HYDROCORTISONE SODIUM SUCCINATE 25 MG: 100 INJECTION, POWDER, FOR SOLUTION INTRAMUSCULAR; INTRAVENOUS at 03:33

## 2023-01-01 RX ADMIN — PHENYLEPHRINE HYDROCHLORIDE 6 MCG/KG/MIN: 50 INJECTION INTRAVENOUS at 09:28

## 2023-01-01 RX ADMIN — CALCIUM GLUCONATE 2 G: 20 INJECTION, SOLUTION INTRAVENOUS at 15:14

## 2023-01-01 RX ADMIN — VASOPRESSIN 2.4 UNITS/HR: 20 INJECTION, SOLUTION INTRAMUSCULAR; SUBCUTANEOUS at 05:15

## 2023-01-01 RX ADMIN — VASOPRESSIN 2.4 UNITS/HR: 20 INJECTION, SOLUTION INTRAMUSCULAR; SUBCUTANEOUS at 02:54

## 2023-01-01 RX ADMIN — LEVOTHYROXINE, LIOTHYRONINE 90 MG: 19; 4.5 TABLET ORAL at 08:07

## 2023-01-01 RX ADMIN — DEXTROSE MONOHYDRATE 1000 ML: 100 INJECTION, SOLUTION INTRAVENOUS at 05:07

## 2023-01-01 RX ADMIN — PIPERACILLIN SODIUM AND TAZOBACTAM SODIUM 4.5 G: 4; .5 INJECTION, SOLUTION INTRAVENOUS at 19:45

## 2023-01-01 RX ADMIN — HYDROCORTISONE SODIUM SUCCINATE 50 MG: 100 INJECTION, POWDER, FOR SOLUTION INTRAMUSCULAR; INTRAVENOUS at 13:53

## 2023-01-01 RX ADMIN — CALCIUM CHLORIDE, MAGNESIUM CHLORIDE, SODIUM CHLORIDE, SODIUM BICARBONATE, POTASSIUM CHLORIDE AND SODIUM PHOSPHATE DIBASIC DIHYDRATE 5000 ML: 3.68; 3.05; 6.34; 3.09; .314; .187 INJECTION INTRAVENOUS at 01:27

## 2023-01-01 RX ADMIN — SODIUM CHLORIDE 1000 ML: 9 INJECTION, SOLUTION INTRAVENOUS at 00:52

## 2023-01-01 RX ADMIN — CALCIUM CHLORIDE, MAGNESIUM CHLORIDE, SODIUM CHLORIDE, SODIUM BICARBONATE, POTASSIUM CHLORIDE AND SODIUM PHOSPHATE DIBASIC DIHYDRATE 200 ML/HR: 3.68; 3.05; 6.34; 3.09; .314; .187 INJECTION INTRAVENOUS at 22:15

## 2023-01-01 RX ADMIN — PIPERACILLIN SODIUM AND TAZOBACTAM SODIUM 4.5 G: 4; .5 INJECTION, SOLUTION INTRAVENOUS at 14:17

## 2023-01-01 RX ADMIN — CALCIUM CHLORIDE, MAGNESIUM CHLORIDE, SODIUM CHLORIDE, SODIUM BICARBONATE, POTASSIUM CHLORIDE AND SODIUM PHOSPHATE DIBASIC DIHYDRATE: 3.68; 3.05; 6.34; 3.09; .314; .187 INJECTION INTRAVENOUS at 21:25

## 2023-01-01 RX ADMIN — SODIUM CHLORIDE 1000 ML: 9 INJECTION, SOLUTION INTRAVENOUS at 21:23

## 2023-01-01 RX ADMIN — MIDAZOLAM HYDROCHLORIDE 1 MG/HR: 1 INJECTION, SOLUTION INTRAVENOUS at 18:58

## 2023-01-01 RX ADMIN — VASOPRESSIN 2.4 UNITS/HR: 20 INJECTION, SOLUTION INTRAMUSCULAR; SUBCUTANEOUS at 04:44

## 2023-01-01 RX ADMIN — CALCIUM CHLORIDE, MAGNESIUM CHLORIDE, DEXTROSE MONOHYDRATE, LACTIC ACID, SODIUM CHLORIDE, SODIUM BICARBONATE AND POTASSIUM CHLORIDE 5000 ML: 5.15; 2.03; 22; 5.4; 6.46; 3.09; .157 INJECTION INTRAVENOUS at 15:21

## 2023-01-01 RX ADMIN — CALCIUM GLUCONATE 2 G: 20 INJECTION, SOLUTION INTRAVENOUS at 05:08

## 2023-01-01 RX ADMIN — HYDROCORTISONE SODIUM SUCCINATE 25 MG: 100 INJECTION, POWDER, FOR SOLUTION INTRAMUSCULAR; INTRAVENOUS at 18:17

## 2023-01-01 RX ADMIN — HYDROCORTISONE SODIUM SUCCINATE 50 MG: 100 INJECTION, POWDER, FOR SOLUTION INTRAMUSCULAR; INTRAVENOUS at 19:05

## 2023-01-01 RX ADMIN — SODIUM CHLORIDE, POTASSIUM CHLORIDE, SODIUM LACTATE AND CALCIUM CHLORIDE 2000 ML: 600; 310; 30; 20 INJECTION, SOLUTION INTRAVENOUS at 16:20

## 2023-01-01 RX ADMIN — DEXTROSE MONOHYDRATE 50 ML: 25 INJECTION, SOLUTION INTRAVENOUS at 19:49

## 2023-01-01 RX ADMIN — Medication 0.03 MCG/KG/MIN: at 02:52

## 2023-01-01 RX ADMIN — SODIUM BICARBONATE: 84 INJECTION, SOLUTION INTRAVENOUS at 05:38

## 2023-01-01 RX ADMIN — MEROPENEM 1 G: 1 INJECTION, POWDER, FOR SOLUTION INTRAVENOUS at 08:38

## 2023-01-01 RX ADMIN — ALBUMIN HUMAN 12.5 G: 0.25 SOLUTION INTRAVENOUS at 18:33

## 2023-01-01 RX ADMIN — MAGNESIUM SULFATE HEPTAHYDRATE 2 G: 2 INJECTION, SOLUTION INTRAVENOUS at 20:54

## 2023-01-01 RX ADMIN — MEROPENEM 1 G: 1 INJECTION, POWDER, FOR SOLUTION INTRAVENOUS at 15:48

## 2023-01-01 RX ADMIN — Medication 100 MEQ: at 19:30

## 2023-01-01 RX ADMIN — CALCIUM CHLORIDE, MAGNESIUM CHLORIDE, SODIUM CHLORIDE, SODIUM BICARBONATE, POTASSIUM CHLORIDE AND SODIUM PHOSPHATE DIBASIC DIHYDRATE 5000 ML: 3.68; 3.05; 6.34; 3.09; .314; .187 INJECTION INTRAVENOUS at 12:13

## 2023-01-01 RX ADMIN — CALCIUM CHLORIDE, MAGNESIUM CHLORIDE, SODIUM CHLORIDE, SODIUM BICARBONATE, POTASSIUM CHLORIDE AND SODIUM PHOSPHATE DIBASIC DIHYDRATE 5000 ML: 3.68; 3.05; 6.34; 3.09; .314; .187 INJECTION INTRAVENOUS at 07:56

## 2023-01-01 RX ADMIN — MAGNESIUM SULFATE HEPTAHYDRATE 2 G: 2 INJECTION, SOLUTION INTRAVENOUS at 21:43

## 2023-01-01 RX ADMIN — CALCIUM CHLORIDE, MAGNESIUM CHLORIDE, SODIUM CHLORIDE, SODIUM BICARBONATE, POTASSIUM CHLORIDE AND SODIUM PHOSPHATE DIBASIC DIHYDRATE 200 ML/HR: 3.68; 3.05; 6.34; 3.09; .314; .187 INJECTION INTRAVENOUS at 12:17

## 2023-01-01 RX ADMIN — Medication 0.7 MCG/KG/MIN: at 10:53

## 2023-01-01 RX ADMIN — PANTOPRAZOLE SODIUM 40 MG: 40 INJECTION, POWDER, FOR SOLUTION INTRAVENOUS at 08:19

## 2023-01-01 RX ADMIN — DEXTROSE MONOHYDRATE 50 ML: 25 INJECTION, SOLUTION INTRAVENOUS at 05:02

## 2023-01-01 RX ADMIN — EPINEPHRINE 0.1 MCG/KG/MIN: 1 INJECTION INTRAMUSCULAR; INTRAVENOUS; SUBCUTANEOUS at 07:08

## 2023-01-01 RX ADMIN — MICAFUNGIN SODIUM 100 MG: 50 INJECTION, POWDER, LYOPHILIZED, FOR SOLUTION INTRAVENOUS at 16:05

## 2023-01-01 RX ADMIN — CALCIUM CHLORIDE, MAGNESIUM CHLORIDE, SODIUM CHLORIDE, SODIUM BICARBONATE, POTASSIUM CHLORIDE AND SODIUM PHOSPHATE DIBASIC DIHYDRATE 5000 ML: 3.68; 3.05; 6.34; 3.09; .314; .187 INJECTION INTRAVENOUS at 23:11

## 2023-01-01 RX ADMIN — SODIUM CHLORIDE 500 ML: 9 INJECTION, SOLUTION INTRAVENOUS at 17:53

## 2023-01-01 RX ADMIN — PHENYLEPHRINE HYDROCHLORIDE 6 MCG/KG/MIN: 50 INJECTION INTRAVENOUS at 22:49

## 2023-01-01 RX ADMIN — DIPHENHYDRAMINE HYDROCHLORIDE 25 MG: 50 INJECTION INTRAMUSCULAR; INTRAVENOUS at 18:00

## 2023-01-01 RX ADMIN — MAGNESIUM SULFATE HEPTAHYDRATE 2 G: 2 INJECTION, SOLUTION INTRAVENOUS at 04:52

## 2023-01-01 RX ADMIN — MEROPENEM 1 G: 1 INJECTION, POWDER, FOR SOLUTION INTRAVENOUS at 00:24

## 2023-01-01 RX ADMIN — LEVOTHYROXINE, LIOTHYRONINE 90 MG: 19; 4.5 TABLET ORAL at 08:19

## 2023-01-01 RX ADMIN — EPINEPHRINE 0.09 MCG/KG/MIN: 1 INJECTION INTRAMUSCULAR; INTRAVENOUS; SUBCUTANEOUS at 18:55

## 2023-01-01 RX ADMIN — Medication 40 MG: at 09:20

## 2023-01-01 RX ADMIN — MICAFUNGIN SODIUM 100 MG: 50 INJECTION, POWDER, LYOPHILIZED, FOR SOLUTION INTRAVENOUS at 17:22

## 2023-01-01 RX ADMIN — MICAFUNGIN SODIUM 100 MG: 50 INJECTION, POWDER, LYOPHILIZED, FOR SOLUTION INTRAVENOUS at 14:27

## 2023-01-01 RX ADMIN — MIDAZOLAM HYDROCHLORIDE 1 MG/HR: 1 INJECTION, SOLUTION INTRAVENOUS at 21:24

## 2023-01-01 RX ADMIN — Medication 0.37 MCG/KG/MIN: at 21:14

## 2023-01-01 RX ADMIN — LEVOTHYROXINE, LIOTHYRONINE 90 MG: 19; 4.5 TABLET ORAL at 09:24

## 2023-01-01 RX ADMIN — CALCIUM CHLORIDE, MAGNESIUM CHLORIDE, DEXTROSE MONOHYDRATE, LACTIC ACID, SODIUM CHLORIDE, SODIUM BICARBONATE AND POTASSIUM CHLORIDE 5000 ML: 5.15; 2.03; 22; 5.4; 6.46; 3.09; .157 INJECTION INTRAVENOUS at 06:40

## 2023-01-01 RX ADMIN — SODIUM BICARBONATE: 84 INJECTION, SOLUTION INTRAVENOUS at 10:06

## 2023-01-01 RX ADMIN — CALCIUM GLUCONATE 2 G: 20 INJECTION, SOLUTION INTRAVENOUS at 04:21

## 2023-01-01 RX ADMIN — CALCIUM CHLORIDE, MAGNESIUM CHLORIDE, DEXTROSE MONOHYDRATE, LACTIC ACID, SODIUM CHLORIDE, SODIUM BICARBONATE AND POTASSIUM CHLORIDE 5000 ML: 5.15; 2.03; 22; 5.4; 6.46; 3.09; .157 INJECTION INTRAVENOUS at 00:57

## 2023-01-01 RX ADMIN — DOCUSATE SODIUM 50 MG: 50 LIQUID ORAL at 14:39

## 2023-01-01 RX ADMIN — INSULIN ASPART 1 UNITS: 100 INJECTION, SOLUTION INTRAVENOUS; SUBCUTANEOUS at 04:12

## 2023-01-01 RX ADMIN — Medication 40 MG: at 08:15

## 2023-01-01 RX ADMIN — CALCIUM CHLORIDE, MAGNESIUM CHLORIDE, SODIUM CHLORIDE, SODIUM BICARBONATE, POTASSIUM CHLORIDE AND SODIUM PHOSPHATE DIBASIC DIHYDRATE 5000 ML: 3.68; 3.05; 6.34; 3.09; .314; .187 INJECTION INTRAVENOUS at 22:38

## 2023-01-01 RX ADMIN — METOPROLOL TARTRATE 5 MG: 5 INJECTION INTRAVENOUS at 18:48

## 2023-01-01 RX ADMIN — CALCIUM CHLORIDE, MAGNESIUM CHLORIDE, SODIUM CHLORIDE, SODIUM BICARBONATE, POTASSIUM CHLORIDE AND SODIUM PHOSPHATE DIBASIC DIHYDRATE 5000 ML: 3.68; 3.05; 6.34; 3.09; .314; .187 INJECTION INTRAVENOUS at 22:39

## 2023-01-01 RX ADMIN — SODIUM CHLORIDE 1000 ML: 9 INJECTION, SOLUTION INTRAVENOUS at 11:35

## 2023-01-01 RX ADMIN — PROPOFOL 20 MCG/KG/MIN: 10 INJECTION, EMULSION INTRAVENOUS at 13:55

## 2023-01-01 RX ADMIN — HYDROCORTISONE SODIUM SUCCINATE 25 MG: 100 INJECTION, POWDER, FOR SOLUTION INTRAMUSCULAR; INTRAVENOUS at 13:11

## 2023-01-01 RX ADMIN — PIPERACILLIN SODIUM AND TAZOBACTAM SODIUM 4.5 G: 4; .5 INJECTION, SOLUTION INTRAVENOUS at 01:47

## 2023-01-01 RX ADMIN — SODIUM BICARBONATE 35 MEQ/HR: 84 INJECTION, SOLUTION INTRAVENOUS at 07:47

## 2023-01-01 RX ADMIN — VASOPRESSIN 2.4 UNITS/HR: 20 INJECTION, SOLUTION INTRAMUSCULAR; SUBCUTANEOUS at 17:49

## 2023-01-01 RX ADMIN — VASOPRESSIN 2.4 UNITS/HR: 20 INJECTION, SOLUTION INTRAMUSCULAR; SUBCUTANEOUS at 21:45

## 2023-01-01 ASSESSMENT — ACTIVITIES OF DAILY LIVING (ADL)
ADLS_ACUITY_SCORE: 43
ADLS_ACUITY_SCORE: 47
ADLS_ACUITY_SCORE: 43
ADLS_ACUITY_SCORE: 43
ADLS_ACUITY_SCORE: 30
ADLS_ACUITY_SCORE: 51
ADLS_ACUITY_SCORE: 43
CONCENTRATING,_REMEMBERING_OR_MAKING_DECISIONS_DIFFICULTY: NO
ADLS_ACUITY_SCORE: 35
ADLS_ACUITY_SCORE: 47
ADLS_ACUITY_SCORE: 43
DRESSING/BATHING_DIFFICULTY: NO
ADLS_ACUITY_SCORE: 43
ADLS_ACUITY_SCORE: 34
ADLS_ACUITY_SCORE: 34
ADLS_ACUITY_SCORE: 47
TOILETING_ISSUES: NO
ADLS_ACUITY_SCORE: 30
ADLS_ACUITY_SCORE: 47
ADLS_ACUITY_SCORE: 35
ADLS_ACUITY_SCORE: 34
ADLS_ACUITY_SCORE: 47
ADLS_ACUITY_SCORE: 36
FALL_HISTORY_WITHIN_LAST_SIX_MONTHS: NO
ADLS_ACUITY_SCORE: 47
ADLS_ACUITY_SCORE: 47
ADLS_ACUITY_SCORE: 34
ADLS_ACUITY_SCORE: 43
CHANGE_IN_FUNCTIONAL_STATUS_SINCE_ONSET_OF_CURRENT_ILLNESS/INJURY: NO
ADLS_ACUITY_SCORE: 35
ADLS_ACUITY_SCORE: 43
ADLS_ACUITY_SCORE: 34
ADLS_ACUITY_SCORE: 53
ADLS_ACUITY_SCORE: 47
ADLS_ACUITY_SCORE: 43
ADLS_ACUITY_SCORE: 35
ADLS_ACUITY_SCORE: 43
ADLS_ACUITY_SCORE: 47
ADLS_ACUITY_SCORE: 47
DIFFICULTY_EATING/SWALLOWING: NO
ADLS_ACUITY_SCORE: 37
ADLS_ACUITY_SCORE: 49
ADLS_ACUITY_SCORE: 47
ADLS_ACUITY_SCORE: 34
ADLS_ACUITY_SCORE: 47
ADLS_ACUITY_SCORE: 34
ADLS_ACUITY_SCORE: 43
ADLS_ACUITY_SCORE: 47
ADLS_ACUITY_SCORE: 35
ADLS_ACUITY_SCORE: 43
ADLS_ACUITY_SCORE: 34
ADLS_ACUITY_SCORE: 43
ADLS_ACUITY_SCORE: 43
ADLS_ACUITY_SCORE: 47
ADLS_ACUITY_SCORE: 34
ADLS_ACUITY_SCORE: 43
ADLS_ACUITY_SCORE: 43
ADLS_ACUITY_SCORE: 47
ADLS_ACUITY_SCORE: 43
ADLS_ACUITY_SCORE: 43
ADLS_ACUITY_SCORE: 30
ADLS_ACUITY_SCORE: 49
ADLS_ACUITY_SCORE: 47
ADLS_ACUITY_SCORE: 43
ADLS_ACUITY_SCORE: 47
ADLS_ACUITY_SCORE: 43
ADLS_ACUITY_SCORE: 47
ADLS_ACUITY_SCORE: 47
ADLS_ACUITY_SCORE: 30
ADLS_ACUITY_SCORE: 34
ADLS_ACUITY_SCORE: 30
ADLS_ACUITY_SCORE: 30
ADLS_ACUITY_SCORE: 34
ADLS_ACUITY_SCORE: 47
ADLS_ACUITY_SCORE: 47
ADLS_ACUITY_SCORE: 49
ADLS_ACUITY_SCORE: 47
ADLS_ACUITY_SCORE: 53
ADLS_ACUITY_SCORE: 34
ADLS_ACUITY_SCORE: 30
ADLS_ACUITY_SCORE: 34
ADLS_ACUITY_SCORE: 43
ADLS_ACUITY_SCORE: 34
ADLS_ACUITY_SCORE: 43
ADLS_ACUITY_SCORE: 47
ADLS_ACUITY_SCORE: 43
ADLS_ACUITY_SCORE: 34
ADLS_ACUITY_SCORE: 34
ADLS_ACUITY_SCORE: 43
ADLS_ACUITY_SCORE: 47
ADLS_ACUITY_SCORE: 47
ADLS_ACUITY_SCORE: 30
ADLS_ACUITY_SCORE: 43
ADLS_ACUITY_SCORE: 47
ADLS_ACUITY_SCORE: 36
ADLS_ACUITY_SCORE: 30
ADLS_ACUITY_SCORE: 43
ADLS_ACUITY_SCORE: 47
ADLS_ACUITY_SCORE: 47
ADLS_ACUITY_SCORE: 53
ADLS_ACUITY_SCORE: 51
ADLS_ACUITY_SCORE: 34
ADLS_ACUITY_SCORE: 43
ADLS_ACUITY_SCORE: 34
ADLS_ACUITY_SCORE: 47
WEAR_GLASSES_OR_BLIND: NO
ADLS_ACUITY_SCORE: 53
ADLS_ACUITY_SCORE: 47
ADLS_ACUITY_SCORE: 35
ADLS_ACUITY_SCORE: 34
ADLS_ACUITY_SCORE: 43
ADLS_ACUITY_SCORE: 47
ADLS_ACUITY_SCORE: 34
ADLS_ACUITY_SCORE: 35
ADLS_ACUITY_SCORE: 43
ADLS_ACUITY_SCORE: 35
ADLS_ACUITY_SCORE: 47
ADLS_ACUITY_SCORE: 34
ADLS_ACUITY_SCORE: 53
ADLS_ACUITY_SCORE: 30
ADLS_ACUITY_SCORE: 47
ADLS_ACUITY_SCORE: 35
ADLS_ACUITY_SCORE: 47
ADLS_ACUITY_SCORE: 34
ADLS_ACUITY_SCORE: 34
ADLS_ACUITY_SCORE: 43
ADLS_ACUITY_SCORE: 43
ADLS_ACUITY_SCORE: 47
WALKING_OR_CLIMBING_STAIRS_DIFFICULTY: NO
ADLS_ACUITY_SCORE: 35
ADLS_ACUITY_SCORE: 47
ADLS_ACUITY_SCORE: 47
ADLS_ACUITY_SCORE: 34
ADLS_ACUITY_SCORE: 34
ADLS_ACUITY_SCORE: 49
ADLS_ACUITY_SCORE: 30
ADLS_ACUITY_SCORE: 43
ADLS_ACUITY_SCORE: 30
ADLS_ACUITY_SCORE: 47
ADLS_ACUITY_SCORE: 34
ADLS_ACUITY_SCORE: 34
ADLS_ACUITY_SCORE: 37
ADLS_ACUITY_SCORE: 43
ADLS_ACUITY_SCORE: 47
ADLS_ACUITY_SCORE: 53
ADLS_ACUITY_SCORE: 43
ADLS_ACUITY_SCORE: 35
ADLS_ACUITY_SCORE: 43
ADLS_ACUITY_SCORE: 34
ADLS_ACUITY_SCORE: 37
DOING_ERRANDS_INDEPENDENTLY_DIFFICULTY: NO

## 2023-01-01 ASSESSMENT — ENCOUNTER SYMPTOMS
ABDOMINAL PAIN: 1
DIFFICULTY URINATING: 0
UNEXPECTED WEIGHT CHANGE: 1
DIARRHEA: 0
FEVER: 0
APPETITE CHANGE: 1
DYSURIA: 0
VOMITING: 0
HEMATURIA: 0
CONSTIPATION: 0
NAUSEA: 1
BLOOD IN STOOL: 0
SHORTNESS OF BREATH: 0

## 2023-05-17 NOTE — ED TRIAGE NOTES
Pt here w/ spouse for multiple complaints. The first is bilateral posterior hand pain and burning that started Sunday after being out in the sun. Believes she may be allergic to the sun and/or heat. Pt also c/o heartburn that started this evening after eating popcorn. Has been having issues w/ heartburn every time she eats and PCP recommended omeprazole. Pt would like a prescription for it today. Also concerned about her kidney function and urine color. Pt declines blood work in triage. States she has a paracentesis and blood work scheduled at [place] on Thursday.

## 2023-05-17 NOTE — ED PROVIDER NOTES
History     Chief Complaint:  Hand Pain and Heartburn       The history is provided by the patient and the spouse.      Teresa De Santiago is a 73 year old female with a history of primary biliary cirrhosis, chronic lymphocytic leukemia, and hypertension who presents with epigastric pain consistent with heart burn, burning sensations to the bilateral hands, and feeling generally unwell.  She reports that since 2 days ago, she has hand itchiness and burning sensations to the bilateral forearms and hands after exposure to the sun.  The burning sensations have now localized to the bilateral hands.  She does not believe she is sunburnt but rather is allergic to the sun.  She also reports having 9/10 burning epigastric pain since yesterday after every time she eats.  She has been eating less due to this.  She called the nurse line today and her PCP recommended omeprazole, which she has never taken.      Of note, patient reports she is scheduled for a paracentesis in 2 days at [facility] and has been feeling nauseous and generally unwell due to fluid retention.  She reports that in December, she had fluid retention but did not undergo a paracentesis as there was not enough fluid to do it.  She was given a Lasiks infusion instead.  This will be her first paracentesis.  Due to the recent increase in fluid, she has had a 20 lb weight gain recently.  She was advised by her PCP to increase her water pill dosage today.  She denies alcohol use.  She denies NSAID use.  She denies history of cardiac problems.  She denies chest pain, shortness of breath, fever, vomiting, diarrhea, constipation, melena, hematochezia, and urinary symptoms.      Independent Historian:   Her  confirms that she was seen at [facility] for the lasiks infusion in December.    Review of External Notes:   I reviewed lab work from 5/4/23    LIVER PANEL  Alkaline Phosphatase 323 High     Bilirubin, Total 3.8 High     Bilirubin, Direct 3.0 High     AST (SGOT)  84 High     ALT (SGPT) 57 High     Protein, Total 5.9 Low     Albumin 2.4 Low       BMP  Sodium 139    Potassium 4.5    Chloride 110 High     CO2 23    Anion Gap 6 Low     Calcium 8.6    BUN 26    Creatinine 1.20 High     Glucose 74    Hours Fasting 14    GFR, Estimated 47 Low         ROS:  Review of Systems   Constitutional: Positive for appetite change and unexpected weight change. Negative for fever.   Respiratory: Negative for shortness of breath.    Cardiovascular: Negative for chest pain.   Gastrointestinal: Positive for abdominal pain and nausea. Negative for blood in stool, constipation, diarrhea and vomiting.   Genitourinary: Negative for difficulty urinating, dysuria and hematuria.   Skin: Positive for rash.   All other systems reviewed and are negative.      Allergies:  Contrast Dye     Medications:    Acetylcysteine    Nadolol  Levothyroxine   Ursodiol     Past Medical History:    Primary biliary cirrhosis  Thyroid disease  Hypertension  Hypothyroidism  Breast cancer  Esophageal varices   Chronic lymphocytic leukemia     Past Surgical History:    Cholecystectomy  Mastectomy  Nephrectomy      Family History:     Hyperlipidemia  Osteoporosis  Seizure disorder    Social History:  The patient presents with her     Physical Exam     Patient Vitals for the past 24 hrs:   BP Temp Temp src Pulse Resp SpO2 Weight   05/17/23 0108 99/52 -- -- 69 -- 98 % --   05/17/23 0100 -- -- -- -- -- 100 % --   05/17/23 0015 100/49 -- -- 70 -- 100 % --   05/16/23 2043 104/53 97.1  F (36.2  C) Temporal 72 20 100 % 68 kg (150 lb)        Physical Exam  General: Alert, no acute distress  HEENT:  Moist mucous membranes.  Posterior oropharynx clear, no exudates.  Scleral icterus.   CV:  RRR, no m/r/g, skin warm and well perfused  Pulm:  CTAB, no wheezes/ronchi/rales.  No acute distress, breathing comfortably  GI:  Soft, nontender, nondistended.  No rebound or guarding.    MSK:  Moving all extremities.  No focal areas of edema,  erythema  Skin:  WWP, no rashes, 2+ bilateral lower extremity edema, skin color mildly jaundice, no diaphoresis  Psych:  Well-appearing, normal affect, regular speech    Emergency Department Course   ECG  ECG taken at 2036, ECG read at 2037  Normal sinus rhythm  Incomplete left bundle branch block  Borderline ECG   No prior EKG  Rate 69 bpm. DE interval 182 ms. QRS duration 110 ms. QT/QTc 392/420 ms. P-R-T axes 54 -9 56.     Laboratory:  Labs Ordered and Resulted from Time of ED Arrival to Time of ED Departure   BASIC METABOLIC PANEL - Abnormal       Result Value    Sodium 132 (*)     Potassium 4.3      Chloride 101      Carbon Dioxide (CO2) 20 (*)     Anion Gap 11      Urea Nitrogen 36.6 (*)     Creatinine 1.46 (*)     Calcium 8.3 (*)     Glucose 97      GFR Estimate 37 (*)    HEPATIC FUNCTION PANEL - Abnormal    Protein Total 5.6 (*)     Albumin 2.8 (*)     Bilirubin Total 4.3 (*)     Alkaline Phosphatase 326 (*)      (*)     ALT 81 (*)     Bilirubin Direct 2.93 (*)    CBC WITH PLATELETS AND DIFFERENTIAL - Abnormal    WBC Count 20.3 (*)     RBC Count 3.20 (*)     Hemoglobin 10.6 (*)     Hematocrit 31.3 (*)     MCV 98      MCH 33.1 (*)     MCHC 33.9      RDW 17.1 (*)     Platelet Count 25 (*)     % Neutrophils 35      % Lymphocytes 57      % Monocytes 6      % Eosinophils 1      % Basophils 0      % Immature Granulocytes 1      NRBCs per 100 WBC 1 (*)     Absolute Neutrophils 7.0      Absolute Lymphocytes 11.7 (*)     Absolute Monocytes 1.2      Absolute Eosinophils 0.2      Absolute Basophils 0.1      Absolute Immature Granulocytes 0.2      Absolute NRBCs 0.2     LIPASE - Normal    Lipase 49     TROPONIN T, HIGH SENSITIVITY - Normal    Troponin T, High Sensitivity 12          Emergency Department Course & Assessments:    Interventions:  Medications   lidocaine (viscous) (XYLOCAINE) 2 % 15 mL, alum & mag hydroxide-simethicone (MAALOX) 15 mL GI Cocktail (30 mLs Oral Not Given 5/16/23 2340)   ondansetron  (ZOFRAN ODT) ODT tab 4 mg (4 mg Oral Not Given 23 2341)        Assessments:  2311 Initial assessment. I gathered history and examined the patient as noted above.   0054 I rechecked the patient and explained findings. I believe that they are safe for discharge at this time.     Social Determinants of Health affecting care:   None    Disposition:  The patient was discharged to home.     Impression & Plan      Medical Decision Makin-year-old female with history of primary biliary cirrhosis, CLL and cholecystectomy presenting to the ER for evaluation of postprandial epigastric abdominal pain.  She reports no pain currently.  She is afebrile vitally stable.  Her abdominal exam is benign without tenderness throughout.  Considered atypical ACS, gastritis, GERD, PUD, hepatobiliary pathology, pancreatitis amongst other things.  Abdominal exam is not consistent with intra-abdominal catastrophe, intraabdominal vascular emergency, or mechanical bowel obstruction.  No concern for SBP at this time.  I do not feel CT imaging is indicated at this time.  EKG shows no acute ischemic appearing changes.  High-sensitivity troponin is normal despite 24 hours of intermittent symptoms.  The rest of her basic lab studies remarkable for mild acute on chronic kidney injury (suspect due to dehydration from poor oral intake), stable appearing liver panel from , nonspecific leukocytosis (doubt sepsis or infection given lack of fever or ongoing localized symptoms), chronic thrombocytopenia. Patient declined GI cocktail in the emergency department.  Her repeat abdominal exam was again benign.  We will start the patient on PPI for her intermittent postprandial abdominal pain.    Additionally, patient reports bilateral hand burning/itching sensation.  I am wondering if this is due to her PBC.  There are otherwise no signs of skin infection.  No skin rashes.  Recommend Benadryl if she has further recurrence of this.    [facility]  observation given her complex history and for monitoring of her postprandial abdominal pain which is causing poor oral intake and now acute on chronic kidney injury but patient declined this.  Instead, patient reports she will follow-up with her medical providers at [facility] that she currently has a paracentesis scheduled for this Thursday.  I feel this is otherwise reasonable.  I did discuss with her reasons to return to the ER.  All questions were answered prior to discharge.    Diagnosis:    ICD-10-CM    1. Abdominal pain, epigastric  R10.13       2. Primary biliary cirrhosis (H)  K74.3       3. Itching of both hands  L29.9       4. Acute renal failure superimposed on chronic kidney disease, unspecified CKD stage, unspecified acute renal failure type (H)  N17.9     N18.9       5. Thrombocytopenia (H)  D69.6            Discharge Medications:  Discharge Medication List as of 5/17/2023  1:07 AM      START taking these medications    Details   omeprazole (PRILOSEC) 40 MG DR capsule Take 1 capsule (40 mg) by mouth daily for 30 days, Disp-30 capsule, R-0, Local Print                Scribe Disclosure:  I, Patricia Castano, am serving as a scribe at 11:09 PM on 5/16/2023 to document services personally performed by Finn Coats MD based on my observations and the provider's statements to me.     5/16/2023   Finn Coats MD [place], Edgar Ronald, MD  05/17/23 8560

## 2023-05-17 NOTE — DISCHARGE INSTRUCTIONS
Follow-up with your [place] physicians regarding your ER visit.    Return to the ER if you develop fever, persistent

## 2023-05-21 NOTE — ED TRIAGE NOTES
"Pt arrives with  for abdominal pain that \"has been going on for a while.\" Pt has unstaged cirrhosis and reports her abdomen is becoming more painful and bloated. Was on lasix but reports she can't take it anymore due to kidney issues.       "

## 2023-05-21 NOTE — ED PROVIDER NOTES
Emergency Department Attending Supervision Note        I evaluated this patient with Nair PAC.      Briefly, the patient presented with malaise and abdominal pain.  She has a benign abdominal exam and describes her abdominal pain as chronic and stable.  Given rising bilirubin, advanced age, and PBC, CT abdomen pelvis and labs were performed.  No acute abnormalities found but suspect worsening PBC disease.  She does have soft blood pressures but this has been a chronic finding, likely related to liver issues, and she is not orthostatic with respect to signs or symptoms.  No infectious etiology is found for her malaise, suspect related to PBC as well.  No change in abdominal pain or tenderness, fever, or leukocytosis to suggest SBP.  We had an extended shared decision-making conversation, which the patient and SON at bedside or consistent and desiring discharge and follow-up with liver specialist to discuss goals of care.  Encouraged to return should weakness or fatigue worsen or for fever, worse pain, or any other concerns.    On exam,     Independent interpretation: No pneumoperitoneum on CT abdomen pelvis          Visit Diagnosis, Associated Orders, and Comments     ICD-10-CM    1. Primary biliary cirrhosis (H)  K74.3       2. Abdominal pain, epigastric  R10.13       3. Generalized muscle weakness  M62.81           Aron Grubbs MD  [facility]        History     Chief Complaint:  Abdominal Pain       HPI   Teresa De Santiago is a 73 year old female primary biliary cirrhosis presenting today for evaluation of progressively worsening fatigue, generalized weakness and continued abdominal pain.  She was seen in the emergency department on 5/16 with burning and itching of the forearms and hands as well as epigastric pain.  She declined hospital observation with plan for paracentesis several days later.  She went in for paracentesis and they were unable to perform as she did  not have enough fluid in the belly.  She states her abdominal distention and pain has previously improved with use of Lasix, however she has recently had worsening in her kidney function and thus cannot take this anymore.  She denies any known fevers, dysuria, hematuria, nausea, vomiting, diarrhea, difficulty breathing.  She notes a cough that she has had for quite some time that is unchanged.     is at bedside and reports that prior to 1 week ago, she was managing well at home and has been progressively declining in terms of weakness and fatigue since then.  He is concerned about a viral infection such as COVID or influenza.    Independent Historian:   Provides most of her own history.  at bedside provides supplemental history.     Review of External Notes:   Office visit from 3/3/2023    Medications:    oxyCODONE (ROXICODONE) 5 MG tablet  acetylcysteine (NAC) 600 MG CAPS capsule  Cholecalciferol (VITAMIN D PO)  Digestive Enzymes (DIGESTIVE ENZYME PO)  MAGNESIUM PO  NADOLOL PO  omeprazole (PRILOSEC) 40 MG DR capsule  THYROID PO  UNABLE TO FIND  URSODIOL  VITAMIN A PO  Zinc Sulfate (ZINC 15 PO)        Past Medical History:    Past Medical History:   Diagnosis Date     Cirrhosis, primary biliary (H)      Thyroid disease 20's       Past Surgical History:    Past Surgical History:   Procedure Laterality Date     GALLBLADDER SURGERY       MASTECTOMY  1994     NEPHRECTOMY          Physical Exam     Patient Vitals for the past 24 hrs:   BP Temp Temp src Pulse Resp SpO2 Height Weight   05/21/23 2050 92/42 -- -- 77 -- 100 % -- --   05/21/23 2045 (!) 86/34 -- -- 78 -- -- -- --   05/21/23 1917 (!) 96/39 -- -- 83 -- -- -- --   05/21/23 1915 (!) 96/39 -- -- 83 -- -- -- --   05/21/23 1902 98/43 -- -- 79 -- -- -- --   05/21/23 1847 95/46 -- -- 82 -- -- -- --   05/21/23 1829 92/45 -- -- 81 -- 92 % -- --   05/21/23 1819 93/49 -- -- -- -- 93 % -- --   05/21/23 1814 96/47 -- -- 81 -- 93 % -- --   05/21/23 1730 95/43 --  "-- 80 19 92 % -- --   05/21/23 1715 98/45 -- -- 81 20 92 % -- --   05/21/23 1655 98/44 -- -- 82 12 94 % -- --   05/21/23 1650 93/45 -- -- 83 -- 95 % -- --   05/21/23 1649 93/45 -- -- 82 -- 93 % -- --   05/21/23 1647 (!) 96/38 -- -- 79 15 -- -- --   05/21/23 1632 99/41 -- -- 80 15 96 % -- --   05/21/23 1612 101/50 -- -- 80 -- 94 % -- --   05/21/23 1607 101/41 -- -- 80 -- -- -- --   05/21/23 1606 98/43 99.6  F (37.6  C) -- 80 17 94 % -- --   05/21/23 1456 110/52 98.2  F (36.8  C) Temporal 81 22 97 % 1.549 m (5' 1\") 72.1 kg (159 lb)        Physical Exam  BP 92/42   Pulse 77   Temp 99.6  F (37.6  C)   Resp 19   Ht 1.549 m (5' 1\")   Wt 72.1 kg (159 lb)   SpO2 100%   BMI 30.04 kg/m     General: Chronically-ill appearing. Patient appears uncomfortable.   Head: Atraumatic. Normocephalic.  EENT: Scleral icterus. PERRL. EOMI. Dry mucus membranes.   CV: Regular rate and rhythm. No appreciable murmurs, rubs, or gallops.   Respiratory: Breathing comfortably on room air. Lungs clear to auscultation bilaterally without wheezes, rhonchi, or rales.  GI: Abdomen is moderately distended, diffusely tender with worse tenderness in the epigastrium.  No rebound or rigidity.  Msk: Extremities without tenderness to palpation or deformity.  Skin: Warm and dry. No rashes.  Neuro: Awake, alert, and conversant. No focal neurologic deficits.   Psych: Appropriate mood and affect.    Emergency Department Course   ECG  ECG taken at 1540, ECG read at 1545 by Aron Grubbs MD  Normal sinus rhythm   Rate 82 bpm. MA interval 172 ms. QRS duration 96 ms. QT/QTc 356/415 ms. P-R-T axes 50 -15 73.      Imaging:  Abd/pelvis CT no contrast - [facility] Protocol   Final Result   IMPRESSION:    1.  Advanced cirrhosis with portal hypertension, splenomegaly, paraesophageal varices and moderate ascites.   2.  Prior cholecystectomy. No suggestion for biliary duct obstruction on this noncontrast exam.            Report per myself    Laboratory:  Labs Ordered and " Resulted from Time of ED Arrival to Time of ED Departure   COMPREHENSIVE METABOLIC PANEL - Abnormal       Result Value    Sodium 130 (*)     Potassium 4.7      Chloride 99      Carbon Dioxide (CO2) 20 (*)     Anion Gap 11      Urea Nitrogen 39.0 (*)     Creatinine 1.68 (*)     Calcium 8.4 (*)     Glucose 87      Alkaline Phosphatase 294 (*)      (*)     ALT 79 (*)     Protein Total 5.4 (*)     Albumin 2.6 (*)     Bilirubin Total 4.6 (*)     GFR Estimate 31 (*)    CBC WITH PLATELETS AND DIFFERENTIAL - Abnormal    WBC Count 20.0 (*)     RBC Count 3.12 (*)     Hemoglobin 10.2 (*)     Hematocrit 30.3 (*)     MCV 97      MCH 32.7      MCHC 33.7      RDW 17.6 (*)     Platelet Count 25 (*)    DIFFERENTIAL - Abnormal    % Neutrophils 27      % Lymphocytes 70      % Monocytes 3      % Eosinophils 0      % Basophils 0      Absolute Neutrophils 5.4      Absolute Lymphocytes 14.0 (*)     Absolute Monocytes 0.6      Absolute Eosinophils 0.0      Absolute Basophils 0.0      RBC Morphology Confirmed RBC Indices      Platelet Assessment        Value: Automated Count Confirmed. Platelet morphology is normal.    Smudge Cells Present (*)    LIPASE - Normal    Lipase 42     LACTIC ACID WHOLE BLOOD - Normal    Lactic Acid 2.0     TROPONIN T, HIGH SENSITIVITY - Normal    Troponin T, High Sensitivity 14     INFLUENZA A/B, RSV, & SARS-COV2 PCR - Normal    Influenza A PCR Negative      Influenza B PCR Negative      RSV PCR Negative      SARS CoV2 PCR Negative     ROUTINE UA WITH MICROSCOPIC REFLEX TO CULTURE   BLOOD CULTURE   BLOOD CULTURE        Emergency Department Course & Assessments:  Interventions:  Medications   alum & mag hydroxide-simethicone (MAALOX) suspension 15 mL (15 mLs Oral $Given 5/21/23 1957)      Independent Interpretation (X-rays, CTs, rhythm strip):  None    Consultations/Discussion of Management or Tests:  None   ED Course as of 05/21/23 2213   Sun May 21, 2023   1714 Blood pressures have remained soft while she  has been here.  Reviewing vital signs from previous visits, this does not appear to be significantly different from previous.       Social Determinants of Health affecting care:   None    Disposition:  The patient was discharged to home.     Impression & Plan    Medical Decision Making:  This is a 73-year-old female with primary biliary cirrhosis presents for worsening acute abdominal pain, fatigue, and generalized weakness.  Work-up today as above.  Given acute worsening of her abdominal pain, did opt to obtain an abdominal and pelvis CT which returned showing worsening of her known liver disease.  Labs also confirmed worsening of her kidney dysfunction and liver disease.  No evidence of acute infection.  No fevers or significant abdominal pain to suggest spontaneous bacterial peritonitis.    While here, patient's blood pressures have been soft down to 92/42.  She was asymptomatic with this and able to ambulate without changes in her mental status.  There is an orthostatic component to her blood pressure.  On chart review, her blood pressure typically is quite low.  So I do not suspect that this is related to today's presentation.  Her labs here are significantly abnormal including a leukocytosis, thrombocytopenia, elevated liver enzymes, however this is at the patient's baseline, thus I do not suspect a new process is affecting these changes.    I had a long discussion with the patient and her  regarding goals of care.  At this time, the patient is aware that her condition is worsening.  She has significant discomfort from her abdominal distention and is following with a liver specialist at [place].  She and her  have concerns about managing her pain at home as well as her difficulty with having bowel movements.  We discussed at home management of constipation.  I recommended increasing fiber intake as well as MiraLAX.  She was given a prescription for oxycodone given that she has had severe pain but is  unmanaged at home which has not resulted in 2 emergency department visits.    We talked extensively about discharge to home versus inpatient observation for her symptoms.  At this time she and her  believe she would recover best at home and I agree with this.  She will be discharged home with a prescription for oxycodone for pain. Plan to call her liver specialist in the morning to discuss further management of her cirrhosis.    Diagnosis:    ICD-10-CM    1. Primary biliary cirrhosis (H)  K74.3       2. Abdominal pain, epigastric  R10.13       3. Generalized muscle weakness  M62.81            Discharge Medications:  Discharge Medication List as of 5/21/2023  8:29 PM      START taking these medications    Details   oxyCODONE (ROXICODONE) 5 MG tablet Take 1 tablet (5 mg) by mouth every 6 hours as needed for pain, Disp-12 tablet, R-0, Local Print                 Opal Nair PA-C  05/21/23 5342       Aron Grubbs MD  05/21/23 6819       Aron Grubbs MD  05/21/23 3981

## 2023-05-22 PROBLEM — A41.9 SEPTIC SHOCK (H): Status: ACTIVE | Noted: 2023-01-01

## 2023-05-22 PROBLEM — E16.2 HYPOGLYCEMIA: Status: ACTIVE | Noted: 2023-01-01

## 2023-05-22 PROBLEM — R65.21 SEPTIC SHOCK (H): Status: ACTIVE | Noted: 2023-01-01

## 2023-05-22 PROBLEM — R78.81 GRAM-NEGATIVE BACTEREMIA: Status: ACTIVE | Noted: 2023-01-01

## 2023-05-22 PROBLEM — I95.9 HYPOTENSION, UNSPECIFIED HYPOTENSION TYPE: Status: ACTIVE | Noted: 2023-01-01

## 2023-05-22 PROBLEM — D69.6 THROMBOCYTOPENIA (H): Status: ACTIVE | Noted: 2023-01-01

## 2023-05-22 NOTE — ED NOTES
BP still running 70-80's systolic. Levophed at max does for PIV dosing. Patient remains alert and orientated. Waiting for paracentesis to p[performed. Blood sugars 40 D50 given then up to 90. Recheck of finger stick blood glucose 57 another dose of D50 given

## 2023-05-22 NOTE — ED PROVIDER NOTES
History     Chief Complaint:  Abnormal Labs       HPI   Teresa De Santiago is a 73 year old female with a history of primary biliary cirrhosis who presents to the ED due to positive blood cultures.  The patient was seen in this facility yesterday due to abdominal pain and malaise.  Ultimately, after discussion with the patient and , they wanted to be discharged to try to manage symptoms at home.  Blood cultures returned positive and as such the patient was called and asked to come back to the emergency department.    She states she does have some abdominal pain but also just in general does not feel well.    She notes that she takes Lasix 3 times a week and take spironolactone daily.  The patient's  states that in the past this has worked very well to reduce the amount of fluid in her body.  She notes that last Thursday they went to [facility] and there was not enough peritoneal fluid to perform a paracentesis.      Independent Historian:    Spouse/Partner - They report And corroborate the above HPI    Review of External Notes:  ED notes from 5/21 and 5/16.  GI Office Visit 5/18/23 ([facility]) - increasing weight (baseline 130, was 150 on 5/15). Only has 1 kidney.     ROS:  See HPI    Allergies:  Contrast Dye     Physical Exam     Patient Vitals for the past 24 hrs:   BP Temp Temp src Pulse Resp SpO2   05/22/23 1435 (!) 87/46 -- -- 78 -- 96 %   05/22/23 1430 91/43 -- -- 79 -- 99 %   05/22/23 1423 (!) 88/62 -- -- 79 -- 95 %   05/22/23 1408 98/47 -- -- 80 -- 96 %   05/22/23 1400 99/41 -- -- 79 -- 94 %   05/22/23 1353 (!) 88/54 -- -- 78 -- 95 %   05/22/23 1345 93/56 -- -- 80 -- 97 %   05/22/23 1315 (!) 82/49 -- -- 73 -- 98 %   05/22/23 1245 (!) 71/38 -- -- 78 -- 98 %   05/22/23 1242 (!) 86/36 -- -- 71 -- --   05/22/23 1240 (!) 86/36 -- -- 71 -- --   05/22/23 1235 (!) 101/24 -- -- 73 -- 91 %   05/22/23 1232 (!) 75/41 -- -- 71 -- --   05/22/23 1230 -- -- -- 71 -- --   05/22/23 1222 (!) 76/39 -- -- 70 -- (!) 89 %    05/22/23 1215 (!) 75/45 -- -- 72 -- 96 %   05/22/23 1212 (!) 80/49 -- -- 72 -- 96 %   05/22/23 1202 (!) 81/38 -- -- 73 -- --   05/22/23 1200 -- -- -- 74 -- --   05/22/23 1152 (!) 84/42 -- -- 75 -- --   05/22/23 1150 (!) 80/38 -- -- 76 -- 94 %   05/22/23 1142 (!) 77/46 -- -- 71 -- (!) 83 %   05/22/23 1135 (!) 58/33 -- -- 73 -- 95 %   05/22/23 1132 (!) 80/36 -- -- 75 -- (!) 41 %   05/22/23 1120 (!) 86/36 -- -- 72 -- 97 %   05/22/23 1112 (!) 83/43 -- -- 74 -- 100 %   05/22/23 1105 (!) 88/40 -- -- 80 -- 100 %   05/22/23 1102 (!) 80/45 -- -- 80 -- 100 %   05/22/23 1057 (!) 86/38 -- -- 80 -- 100 %   05/22/23 1052 (!) 82/38 -- -- 82 -- 98 %   05/22/23 1050 -- -- -- 80 -- 98 %   05/22/23 1047 (!) 79/37 -- -- 81 -- 98 %   05/22/23 1042 (!) 79/41 -- -- 80 -- 98 %   05/22/23 1037 (!) 79/40 -- -- 80 -- 97 %   05/22/23 1035 -- -- -- 82 -- 97 %   05/22/23 1032 (!) 79/39 -- -- 81 -- 94 %   05/22/23 1027 (!) 75/36 -- -- 80 -- 99 %   05/22/23 1022 (!) 80/40 -- -- 80 -- 98 %   05/22/23 1020 -- -- -- 82 -- 99 %   05/22/23 1017 (!) 79/39 -- -- 81 -- 99 %   05/22/23 1012 (!) 80/35 -- -- 81 -- (!) 76 %   05/22/23 1007 (!) 77/33 -- -- 81 -- 97 %   05/22/23 1005 -- -- -- 81 -- 98 %   05/22/23 1002 (!) 79/32 -- -- 80 -- 98 %   05/22/23 0958 (!) 74/34 -- -- 81 -- 99 %   05/22/23 0957 (!) 74/34 -- -- 82 -- --   05/22/23 0953 (!) 76/32 -- -- 80 -- 95 %   05/22/23 0950 -- -- -- 81 -- 95 %   05/22/23 0943 (!) 73/30 -- -- 76 -- 96 %   05/22/23 0938 (!) 68/32 -- -- 64 -- (!) 86 %   05/22/23 0935 -- -- -- 61 -- 96 %   05/22/23 0933 (!) 74/31 -- -- 58 -- 95 %   05/22/23 0928 (!) 60/38 -- -- 73 -- 95 %   05/22/23 0920 (!) 67/32 -- -- 82 -- 95 %   05/22/23 0913 (!) 68/35 -- -- 78 -- 96 %   05/22/23 0905 (!) 72/30 -- -- 77 -- 97 %   05/22/23 0858 (!) 71/31 -- -- 77 -- 96 %   05/22/23 0850 (!) 69/29 -- -- 78 -- 97 %   05/22/23 0843 (!) 71/32 -- -- 75 -- 100 %   05/22/23 0835 -- -- -- -- -- 99 %   05/22/23 0828 (!) 69/30 -- -- 76 -- 100 %   05/22/23  0820 (!) 68/36 -- -- -- -- --   05/22/23 0816 (!) 71/31 98.4  F (36.9  C) Temporal 74 18 96 %        Physical Exam  Constitutional: Vital signs reviewed as above.   HENT:    Head: No external signs of trauma noted.   Eyes: Conjunctivae are normal. Pupils are equal, round, and reactive to light.   Cardiovascular:    Normal rate, regular rhythm and normal heart sounds.     Exam reveals no friction rub.     No murmur heard.  Pulmonary/Chest:    Effort normal and breath sounds normal.    No respiratory distress.    There are no wheezes.    There are no rales.   Gastrointestinal:    Soft.    Bowel sounds normal.    There is no distension.    There is generalized mild abdominal soreness.    There is no rebound or guarding.   Musculoskeletal:    Normal range of motion.    Normal Tone  Neurological: Patient is alert and oriented to person, place, and time.   Skin: Skin is warm and dry. Patient is not diaphoretic  Psychiatric: The patient appears calm      Emergency Department Course   ECG  ECG taken at 0827, ECG read at 0835  NSR  Normal ECG     No significant change as compared to prior, dated 5/16/23.  Rate 77 bpm. AK interval 192 ms. QRS duration 100 ms. QT/QTc 384/434 ms. P-R-T axes 67 -13 30.           Imaging:  US Paracentesis without Albumin   Final Result   IMPRESSION:   1.  Status post ultrasound-guided paracentesis.      HOWARD WORKMAN MD            SYSTEM ID:  [id number]      XR Chest Port 1 View   Final Result   IMPRESSION: Right neck central venous line tip within the mid right   atrium. Consider retraction of this line by approximately 2.9 cm for   placement at the cavoatrial junction. Left base atelectasis.   Hypoinflated lungs. Normal cardiac silhouette.      KENDRICK EATON MD            SYSTEM ID:  [id number]         Report per radiology    Laboratory:  Labs Ordered and Resulted from Time of ED Arrival to Time of ED Departure   COMPREHENSIVE METABOLIC PANEL - Abnormal       Result Value    Sodium 130 (*)      Potassium 5.0      Chloride 99      Carbon Dioxide (CO2) 16 (*)     Anion Gap 15      Urea Nitrogen 50.7 (*)     Creatinine 2.21 (*)     Calcium 8.8      Glucose 40 (*)     Alkaline Phosphatase 268 (*)     AST 98 (*)     ALT 74 (*)     Protein Total 5.2 (*)     Albumin 2.3 (*)     Bilirubin Total 5.3 (*)     GFR Estimate 23 (*)    CBC WITH PLATELETS AND DIFFERENTIAL - Abnormal    WBC Count 18.4 (*)     RBC Count 3.12 (*)     Hemoglobin 10.3 (*)     Hematocrit 30.8 (*)     MCV 99      MCH 33.0      MCHC 33.4      RDW 18.0 (*)     Platelet Count 32 (*)    ISTAT GASES LACTATE VENOUS POCT - Abnormal    Lactic Acid POCT 5.5 (*)     Bicarbonate Venous POCT 17 (*)     O2 Sat, Venous POCT 49 (*)     pCO2V Venous POCT 33 (*)     pH Venous POCT 7.32      pO2 Venous POCT 28     GLUCOSE BY METER - Abnormal    GLUCOSE BY METER POCT 20 (*)    LACTIC ACID WHOLE BLOOD - Abnormal    Lactic Acid 6.6 (*)    DIFFERENTIAL - Abnormal    % Neutrophils 14      % Lymphocytes 80      % Monocytes 3      % Eosinophils 1      % Basophils 0      % Metamyelocytes 2      Absolute Neutrophils 2.6      Absolute Lymphocytes 14.7 (*)     Absolute Monocytes 0.6      Absolute Eosinophils 0.2      Absolute Basophils 0.0      Absolute Metamyelocytes 0.4 (*)     RBC Morphology Confirmed RBC Indices      Platelet Assessment        Value: Automated Count Confirmed. Platelet morphology is normal.    Smudge Cells Present (*)    GLUCOSE BY METER - Abnormal    GLUCOSE BY METER POCT 57 (*)    GLUCOSE BY METER - Normal    GLUCOSE BY METER POCT 90     ALBUMIN FLUID   PROTEIN FLUID   CELL COUNT BODY FLUID   DIFERENTIAL BODY FLUID   SODIUM RANDOM URINE   BLOOD CULTURE   AEROBIC BACTERIAL CULTURE ROUTINE   BLOOD CULTURE   CELL COUNT WITH DIFFERENTIAL FLUID        [facility]    -Central Line    Date/Time: 5/22/2023 3:04 PM    Performed by: Stewart Tidwell DO  Authorized by: Stewart Tidwell DO    Risks, benefits and alternatives  discussed.      UNIVERSAL PROTOCOL   Site Marked: Yes  Prior Images Obtained and Reviewed:  NA  Required items: Required blood products, implants, devices and special equipment available    Patient identity confirmed:  Verbally with patient, arm band, provided demographic data and hospital-assigned identification number  NA - No sedation, light sedation, or local anesthesia  Confirmation Checklist:  Patient's identity using two indicators, relevant allergies, correct equipment/implants were available and procedure was appropriate and matched the consent or emergent situation  Time out: Immediately prior to the procedure a time out was called    Universal Protocol: the Joint Commission Universal Protocol was followed      PRE-PROCEDURE DETAILS:     Hand hygiene: Hand hygiene performed prior to insertion      Sterile barrier technique: All elements of maximal sterile technique followed      Skin preparation:  ChloraPrep    Skin preparation agent: Skin preparation agent completely dried prior to procedure         ANESTHESIA    Anesthesia: Local infiltration  Local Anesthetic:  Lidocaine 1% without epinephrine    PROCEDURE DETAILS:     Location:  R internal jugular    Patient position:  Trendelenburg    Procedural supplies:  Triple lumen    Catheter size:  7 Fr    Landmarks identified: yes      Ultrasound guidance: yes      Sterile ultrasound techniques: Sterile gel and sterile probe covers were used      Number of attempts:  3    Successful placement: yes      POST PROCEDURE DETAILS:      Post-procedure:  Dressing applied and line sutured    Assessment:  Blood return through all ports, no pneumothorax on x-ray, free fluid flow and placement verified by x-ray    PROCEDURE    Patient Tolerance:  Patient tolerated the procedure well with no immediate complications         Emergency Department Course & Assessments:             Interventions:  Medications   sodium chloride (PF) 0.9% PF flush 3 mL (has no administration in  time range)   sodium chloride (PF) 0.9% PF flush 3 mL (3 mLs Intracatheter Not Given 5/22/23 1029)   sodium chloride 0.9% infusion (0 mLs Intravenous Stopped 5/22/23 1303)   lidocaine 1 % 0.1-5 mL (has no administration in time range)   lidocaine (LMX4) cream (has no administration in time range)   sodium chloride (PF) 0.9% PF flush 10-40 mL (has no administration in time range)   norepinephrine (LEVOPHED) 4 mg in  mL infusion PREMIX (0.26 mcg/kg/min × 72.1 kg Intravenous Rate/Dose Change 5/22/23 1423)   dextrose 5% and 0.9% NaCl infusion ( Intravenous $New Bag 5/22/23 1338)   hydrOXYzine (ATARAX) tablet 25 mg (has no administration in time range)   albumin human 25 % injection 100 g (has no administration in time range)   prochlorperazine (COMPAZINE) injection 5 mg (has no administration in time range)     Or   prochlorperazine (COMPAZINE) tablet 5 mg (has no administration in time range)     Or   prochlorperazine (COMPAZINE) suppository 12.5 mg (has no administration in time range)   0.9% sodium chloride BOLUS (0 mLs Intravenous Stopped 5/22/23 1120)   piperacillin-tazobactam (ZOSYN) infusion 3.375 g (0 g Intravenous Stopped 5/22/23 1303)   ondansetron (ZOFRAN ODT) ODT tab 4 mg (4 mg Oral $Given 5/22/23 0835)   dextrose 50 % injection (50 mLs  $Given 5/22/23 0929)   ondansetron (ZOFRAN) injection 4 mg (4 mg Intravenous Not Given 5/22/23 1056)   dextrose 50 % injection 25 g (25 g Intravenous $Given 5/22/23 1211)   lidocaine 1 % 1-30 mL (10 mLs Subcutaneous $Given 5/22/23 1421)        Assessments, Independent Interpretation, Consult/Discussion of ManagementTests:  ED Course as of 05/22/23 1444   Mon May 22, 2023   0824 Dr. Tidwell evaluation.   0847 D/W Dr. Lehman (IR) - will get on schedule for paracentesis   0848 iStat Gases (lactate) venous, POCT(!!)  Noted to be elevated. Sepsis workup/treatment started before LA was resulted.   0929 Pt noted to be hypoglycemic. Orange juice and D50 given. BP still low  "despite almost 1L NS. Will order norepinephrine.   1001 D/W Dr. Prieto. Ok for ICU admit. Will use 2g Rocephin at next Abx dosing given E.coli sensitivity.   1153 XR Chest Port 1 View  CVC usable. Seems somewhat deep.   1209 Lactic acid whole blood(!!)  LA noted to be elevated   1255 Rechecked and updated. D5 infusion started as patient was hypoglycemic again. Central line concentration of levophed ordered.   1336 Rechecked and updated. Still no bed available at [facility]. Awaiting return call on [facility]. Otherwise patient would like transfer to [place]   1356 Rechecked and updated on plan. We can now keep the patient at [facility]. IR at bedside for paracentesis. The patient feels that her \"nerves\" aren't doing well and needs something to help relax.       Social Determinants of Health affecting care:  None    Disposition:  The patient was admitted to the hospital under the care of Dr. Prieto.     Impression & Plan    CMS Diagnoses:   The patient has signs of Septic Shock  The patient has signs of septic shock as evidenced by:  1. Presence of Sepsis, AND  2. Lactic Acidosis with value greater than or equal to 4    Time septic shock diagnosis confirmed = 0841  05/22/23   as this was the time when Lactate was resulted and the level was greater than or equal to 4    3 Hour Septic Shock Bundle Completion:  1. Initial Lactic Acid Result:   Recent Labs   Lab Test 05/22/23  1206 05/22/23  0841 05/21/23  1556   LACT 6.6* 5.5* 2.0     2. Blood Cultures before Antibiotics: Yes  3. Broad Spectrum Antibiotics Administered:  yes       Anti-infectives (From admission through now)    Start     Dose/Rate Route Frequency Ordered Stop    05/22/23 0825  piperacillin-tazobactam (ZOSYN) infusion 3.375 g        Note to Pharmacy: For [facility], [facility] and [facility]: For Zosyn-naive patients, use the \"Zosyn initial dose + extended infusion\" order panel.    3.375 g  100 mL/hr over 30 Minutes Intravenous ONCE 05/22/23 0824 05/22/23 1303          4. IF 30 mL/kg bolus " criteria met based on:  -Lactate > 4  OR  -Initial Hypotension:  Definition:  2 low BP readings (SBP <90, MAP <65, or decrease > 40 from baseline due to infection) within 3 hrs of each other during the time period of 6 hrs before and 3 hrs  after time zero  THEN: Fluid volume administered in ED:  Full 30 mL/kg bolus intentionally NOT administered to this patient due to plan is to administer 2000 ml of IV fluids. Due to ongoing liver issues and ascites a full 30 mils per kilogram bolus was not administered to this patient.    BMI Readings from Last 1 Encounters:   05/21/23 30.04 kg/m      30 mL/kg fluids based on weight: 2,160 mL  30 mL/kg fluids based on IBW (must be >= 60 inches tall): 1,430 mL    Septic Shock reassessment:  1. Repeat Lactic Acid Level: 6.6  2. Vasopressors started for Persistent Hypotension defined by the last 2 BP readings within the ONE HOUR following completion of the 30mL/kg bolus being low (SBP <90 or MAP <65).    I attest to having performed a repeat sepsis exam and assessment of perfusion at 1255 and the results demonstrate no change.      Code Status: No Order    Medical Decision Making:  This 73-year-old female patient presents to the ED due to E. coli bacteremia.  Please see the HPI and exam for specifics.  The patient was noted to be quite hypotensive and her lactic acid was quite elevated.  Sepsis work-up was started and the patient received IV fluids without much improvement in her blood pressure.  Ultimately, central venous access was obtained and the patient was started on vasopressors which have started to improve her blood pressure.     Interventional radiology agreed to try and obtain a diagnostic paracentesis to send this for culture.    The patient was also found to be hypoglycemic and despite juice and D50 she became hypoglycemic again so she was started on a D5 normal saline infusion.    There was initially question about being able to keep the patient here at [facility]  so other avenues for admission were pursued but ultimately we did have a bed here and the patient will be admitted for to the hospitalist service in the intensive care unit for ongoing monitoring and care.    Critical Care Note:    Organ systems at risk for life threatening failure: Cardiovascular, GI and Endocrine  Associated problems: Acidosis, Hypotension, Infection, Metabolic Changes and Sepsis  Critical Interventions: IV Fluids and Vasopressors, antibiotics and dextrose containing fluid infusion    Total Time: 45 minutes (excluding separately billed procedures)     Includes: Bedside management, Case discussion related to critical care, Documentation, Multiple re-evaluations, Record review, and Test review.     Excludes: Central Line          Diagnosis:    ICD-10-CM    1. Gram-negative bacteremia  R78.81       2. Hypotension, unspecified hypotension type  I95.9       3. Thrombocytopenia (H)  D69.6       4. Septic shock (H)  A41.9     R65.21       5. Hypoglycemia  E16.2            Discharge Medications:  New Prescriptions    No medications on file          5/22/2023   Stewart Tidwell DO Burns, Bradley Joseph, DO  05/22/23 1508

## 2023-05-22 NOTE — DISCHARGE INSTRUCTIONS
- Try Miralax for constipation and discomfort  - Can also consider trying fiber supplements  - Call liver specialist at your earliest convenience  - Oxycodone ever 6 hours as needed for back and abdominal pain    Discharge Instructions  Constipation  Constipation can cause severe cramping pain and your provider thinks this might be the cause of your abdominal pain (belly pain) today.  People usually recognize that they are constipated because they have difficulty having bowel movements, are not having bowel movements frequently enough, or are not having large enough bowel movements. Sometimes, especially in children or older people, you do not recognize that you are constipated until it becomes severe. The most common causes of constipation are a lack of exercise and not eating enough fruits, vegetables, and whole grains. Constipation can also be a side effect of medications, such as narcotics, or may be caused by a disease of the digestive system.    Generally, every Emergency Department visit should have a follow-up clinic visit with either a primary or a specialty clinic/provider. Please follow-up as instructed by your emergency provider today. Sometimes, chronic constipation requires further testing to determine the cause. If you are over 50 years old, you may need a colonoscopy if you have not had one before.     Return to the Emergency Department if:  Your abdominal pain worsens or does not improve after a bowel movement.  You become very weak.  You get a temperature above 102oF or as directed by your provider.  You have blood in your stools (bright red or black, tarry stools).  You keep vomiting (throwing up) or cannot drink liquids.  Your see blood when you vomit.  Your stomach gets bloated or bigger.  You have new symptoms or anything that worries you.    What can I do to help myself?  If your provider gave you a cathartic medication, like magnesium citrate or GoLytely  (polyethylene glycol), you can expect  to have cramps and gas pains after taking it. You can expect to have a number of bowel movements and even diarrhea (loose or watery stools) in the course of clearing your bowels.  You will know your bowels have been cleaned out after you pass clear liquid. The cramps and gas should let up after you have emptied your bowels. You may want to wait until morning to take this type of medication so you aren t up in the night.   Sometimes instead of cathartics, we recommend laxatives like milk of magnesia to move your bowels more slowly, or an enema to help the bowels to move. Read and follow the package directions, or follow your provider s instructions.  Once you have become very constipated, it takes time for your bowels to return to normal and you need to be very careful to prevent becoming constipated again. Take a laxative if you do not move your bowels at least every two days.     Eat foods that have a lot of fiber. Good choices are fruits, vegetables, prune juice, apple juice, and high fiber cereal. Limit dairy products such as milk and cheese, since these can make constipation worse.   Drink plenty of water.   When you feel the need to go to the bathroom, go to the bathroom. Do not hold it.  Miralax , Metamucil , Colace , Senna or fiber supplements can be used daily.  Miralax  daily is often the best choice for children.  If you were given a prescription for medicine here today, be sure to read all of the information (including the package insert) that comes with your prescription.  This will include important information about the medicine, its side effects, and any warnings that you need to know about.  The pharmacist who fills the prescription can provide more information and answer questions you may have about the medicine.  If you have questions or concerns that the pharmacist cannot address, please call or return to the Emergency Department.   Remember that you can always come back to the Emergency  Department if you are not able to see your regular provider in the amount of time listed above, if you get any new symptoms, or if there is anything that worries you.

## 2023-05-22 NOTE — H&P
[facility] History and Physical    Teresa De Santiago MRN# [id number]   Age: 73 year old YOB: 1950     Date of Admission:  5/22/2023    Home clinic: [facility]  Primary care provider: Sally Cool          Assessment and Plan:   Assessment:   Teresa De Santiago is a 73 year old woman whose medical history is most significant for primary biliary cirrhosis diagnosed approximately 10 years ago that is now quite end-stage.  She has anasarca, esophageal varices, chronic anemia and chronic thrombocytopenia associated with that diagnosis.  Unfortunately, she also reports that she only has 1 kidney following very remote right nephrectomy for a nonmalignant mass and also recently was diagnosed with CLL which does not require any treatment at this time.  She came to attention on this date at the request of the emergency department after having blood cultures returned positive for E. coli this morning.  Those labs were drawn yesterday, 5/21 after she presented with malaise and abdominal pain that have been progressing over the several preceding days.    Today, 5/22, less than 24 hours blood collection, when 2 of 2 blood cultures returned with E. coli by very gene testing, the patient was called and asked to return to the emergency department for treatment.  On arrival today, VS: BP 71/31, HR 74, RR 18.  Oxygen saturation 96% breathing room air.  Patient is afebrile.  Examination reveals an ill-appearing elderly woman who is nauseated and appears sleepy.  Abdomen is obviously fairly tender but without any rebound or guarding.  Labs: WBC 18.4 with predominance of lymphocytes, Hgb 10.3, PLT 32.  VBG: pH 7.32/PCO2 33/HCO3 17.  Creatinine 2.2, sodium 130, potassium 5.0, CO2 16.  Alkaline phosphatase 268, AST 98/ALT 74, bilirubin 5.3.  Lactic acid 5.5.  Glucose was 40.    In the emergency department, Ms. De Santiago was fluid resuscitated with 2 L of crystalloid.  She  was also incidentally found to be significantly hypoglycemic (glucose fell to 20 mg/dL) at which time she was given an amp of D50.  Due to the hypotension, the patient was started on norepinephrine with brisk response.  At the time that I went to see the patient, she had just undergone paracentesis.  The ascitic fluid was cloudy and yellow.  Albumin is 0.3, protein 0.6 and cell counts are currently pending.  She remains nauseated and clearly is tender diffusely over her abdomen.    Diagnoses:  1.  Septic shock due to E. coli bacteremia.  I suspect the source is SBP.  2.  Acute kidney injury associated with septic shock.  3.  CKD stage II related to remote right nephrectomy.  Baseline creatinine appears to be 1.1-1.2 over the last year.  Admission creatinine is 2.2.  4.  Metabolic acidosis without an anion gap.  However I believe that this is related the shock and would be consistent with an anion gap type metabolic acidosis that is compensated.  5.  Hypoglycemia in the setting of known liver failure.   6.  CLL.  6.  Chronic anemia with hemoglobin baseline 10 - 11.5.  7.  Chronic thrombocytopenia with baseline platelet count 40-60.  Admission platelets 30.  8.  Primary biliary cirrhosis.  Patient is under the care of the [place] hepatology service but is not being offered a transplant.  9.  Electrolyte abnormalities are consistent with other metabolic disturbances and some degree of decreased effective circulating volume (hypovolemic hyponatremia).       Plan:   1.  Admit to inpatient.  2.  A right IJ was placed in the emergency department.  3.  Continue ceftriaxone 2 g every 24 hours.  4.  Albumin 100 g of 25% albumin x1.  5.  Check a cortisol from previously drawl labs.  6.  CT scan from yesterday shows no evidence of biliary duct obstruction.  I will ask for GI to see the patient for additional guidance.  Would wonder if we need to further image the biliary tract or consider ERCP.             Chief Complaint:   E.  "coli bacteremia     History is obtained from the patient, the patient's  who is at the bedside, emergency room physician and electronic medical record.    Ms. De Santiago reportedly had become very weak and severely dyspneic over the course of several days prior to coming to attention on 5/21/2023.  It is notable that the patient had been seen in our emergency department also on 5/16/2023 at which time she complained of burning epigastric pain that was worse with eating and generally feeling \"unwell\".  She had been down to [place] in [place] for the labs and paracentesis and to see her hepatologist, Dr. Cool.  At that time, her creatinine was 1.72 and the elevated value was attributed to the patient taking diuretic medications on an irregular basis: She would take Lasix and spironolactone for a week and then stop it for a week.  In retrospect, it is possible the patient had onset of symptoms as long ago as last week.    On 521, the patient had come in complaining of malaise and abdominal pain.  She apparently described her abdominal pain as \"stable\", but at this time in retrospect she describes shaking chills before yesterday although she did not report any fevers to the ER team.  They apparently did have some discussion with the emergency room physician about the possibility of admitting her for observation but because there was no firm evidence of infection, the family opted to take the patient home in order to follow-up with her liver specialist.    Today, patient was called back due to positive blood cultures.  She indicates that she has had shaking chills intermittently over the last several days that she is vague about the total duration there.  She has been extremely weak with activity and today was barely able to stand independently.  She reports significant nausea as well as diffuse abdominal pain.  She denies urinary discomfort.        Past Medical History:     Past Medical History:   Diagnosis Date     " Cirrhosis, primary biliary (H)      Thyroid disease 20's             Past Surgical History:      Past Surgical History:   Procedure Laterality Date     GALLBLADDER SURGERY       MASTECTOMY  1994     NEPHRECTOMY               Social History:     Social History     Tobacco Use     Smoking status: Never     Smokeless tobacco: Never   Vaping Use     Vaping status: Not on file   Substance Use Topics     Alcohol use: No     Alcohol/week: 0.0 standard drinks of alcohol             Family History:     Family History   Problem Relation Age of Onset     Cancer Mother      Heart Disease Mother      Osteoporosis Mother      Cerebrovascular Disease Mother      Seizure Disorder Mother      Thyroid Disease Mother      High cholesterol Other         sibling     Thyroid Disease Other         sibling     Family history reviewed but considered noncontributory to this hospitalization         Allergies:     Allergies   Allergen Reactions     Contrast Dye Hives             Medications:   Furosemide 20 mg every Monday Wednesday and Friday  Nadolol 40 mg nightly  Omeprazole 40 mg daily  Zinc  Spironolactone 50 mg daily  [place] Thyroid 90 mg daily  Ursodiol 500 mg twice daily  Vitamin A         Review of Systems:   A comprehensive review of systems was performed and found to be negative except as described in this note           Physical Exam:   Vitals were reviewed  Temp: 98.4  F (36.9  C) Temp src: Temporal BP: (!) 87/46 Pulse: 78   Resp: 18 SpO2: 96 % O2 Device: None (Room air)    Constitutional: Awake, alert, cooperative, generally fatigued appearing.  Eyes: Lids and lashes normal, pupils equal, round and reactive to light, extra ocular muscles intact, sclera mildly jaundiced, conjunctiva normal.  ENT: Normocephalic, without obvious abnormality, atraumatic.  Oropharynx is free of obvious lesions.  Neck: Supple, symmetrical, trachea midline, no adenopathy, thyroid symmetric, not enlarged and no tenderness, skin normal.  Hematologic /  Lymphatic: No cervical lymphadenopathy and no supraclavicular lymphadenopathy.  Lungs: No increased work of breathing, good air exchange, clear to auscultation bilaterally, no crackles or wheezing.  Cardiovascular: Regular rate and rhythm and no murmur noted.  Abdomen: Diffusely tender.  No rebound or guarding.  Bowel sounds are decreased.  No masses appreciated.  Musculoskeletal:  Soft pitting edema noted over the ankles bilaterally.  Neurologic: Awake, alert, oriented to name, place and time.  Cranial nerves II-XII are grossly intact.   Neuropsychiatric: Normal affect, mood, orientation, memory and insight.  Skin: No rashes, erythema, pallor, petechia or purpura.          Data:     Results for orders placed or performed during the hospital encounter of 05/22/23 (from the past 24 hour(s))   EKG 12 lead   Result Value Ref Range    Systolic Blood Pressure  mmHg    Diastolic Blood Pressure  mmHg    Ventricular Rate 77 BPM    Atrial Rate 77 BPM    CT Interval 192 ms    QRS Duration 100 ms     ms    QTc 434 ms    P Axis 67 degrees    R AXIS -13 degrees    T Axis 30 degrees    Interpretation ECG       Sinus rhythm  Normal ECG  When compared with ECG of 21-MAY-2023 15:40,  Nonspecific T wave abnormality no longer evident in Lateral leads     CBC with platelets differential    Narrative    The following orders were created for panel order CBC with platelets differential.  Procedure                               Abnormality         Status                     ---------                               -----------         ------                     CBC with platelets and d...[[id number]]  Abnormal            Final result               Manual Differential[[id number]]          Abnormal            Final result                 Please view results for these tests on the individual orders.   Comprehensive metabolic panel   Result Value Ref Range    Sodium 130 (L) 136 - 145 mmol/L    Potassium 5.0 3.4 - 5.3 mmol/L    Chloride 99 98  "- 107 mmol/L    Carbon Dioxide (CO2) 16 (L) 22 - 29 mmol/L    Anion Gap 15 7 - 15 mmol/L    Urea Nitrogen 50.7 (H) 8.0 - 23.0 mg/dL    Creatinine 2.21 (H) 0.51 - 0.95 mg/dL    Calcium 8.8 8.8 - 10.2 mg/dL    Glucose 40 (LL) 70 - 99 mg/dL    Alkaline Phosphatase 268 (H) 35 - 104 U/L    AST 98 (H) 10 - 35 U/L    ALT 74 (H) 10 - 35 U/L    Protein Total 5.2 (L) 6.4 - 8.3 g/dL    Albumin 2.3 (L) 3.5 - 5.2 g/dL    Bilirubin Total 5.3 (H) <=1.2 mg/dL    GFR Estimate 23 (L) >60 mL/min/1.73m2   CBC with platelets and differential   Result Value Ref Range    WBC Count 18.4 (H) 4.0 - 11.0 10e3/uL    RBC Count 3.12 (L) 3.80 - 5.20 10e6/uL    Hemoglobin 10.3 (L) 11.7 - 15.7 g/dL    Hematocrit 30.8 (L) 35.0 - 47.0 %    MCV 99 78 - 100 fL    MCH 33.0 26.5 - 33.0 pg    MCHC 33.4 31.5 - 36.5 g/dL    RDW 18.0 (H) 10.0 - 15.0 %    Platelet Count 32 (LL) 150 - 450 10e3/uL    Narrative    Previously reported component [ NRBCs ] is no longer reported.\"  Previously reported component [ NRBCs Absolute ] is no longer reported.\"   iStat Gases (lactate) venous, POCT   Result Value Ref Range    Lactic Acid POCT 5.5 (HH) <=2.0 mmol/L    Bicarbonate Venous POCT 17 (L) 21 - 28 mmol/L    O2 Sat, Venous POCT 49 (L) 94 - 100 %    pCO2V Venous POCT 33 (L) 40 - 50 mm Hg    pH Venous POCT 7.32 7.32 - 7.43    pO2 Venous POCT 28 25 - 47 mm Hg   Manual Differential   Result Value Ref Range    % Neutrophils 14 %    % Lymphocytes 80 %    % Monocytes 3 %    % Eosinophils 1 %    % Basophils 0 %    % Metamyelocytes 2 %    Absolute Neutrophils 2.6 1.6 - 8.3 10e3/uL    Absolute Lymphocytes 14.7 (H) 0.8 - 5.3 10e3/uL    Absolute Monocytes 0.6 0.0 - 1.3 10e3/uL    Absolute Eosinophils 0.2 0.0 - 0.7 10e3/uL    Absolute Basophils 0.0 0.0 - 0.2 10e3/uL    Absolute Metamyelocytes 0.4 (H) <=0.0 10e3/uL    RBC Morphology Confirmed RBC Indices     Platelet Assessment  Automated Count Confirmed. Platelet morphology is normal.     Automated Count Confirmed. Platelet " morphology is normal.    Smudge Cells Present (A) None Seen   Extra Tube ([place] Draw)    Narrative    The following orders were created for panel order Extra Tube ([place] Draw).  Procedure                               Abnormality         Status                     ---------                               -----------         ------                     Extra Red Top Tube[[id number]]                               Final result               Extra Green Top (L[place]...[[id number]]                                                   Please view results for these tests on the individual orders.   Extra Red Top Tube   Result Value Ref Range    Hold Specimen JIC    Extra Tube ([place] Draw)    Narrative    The following orders were created for panel order Extra Tube ([place] Draw).  Procedure                               Abnormality         Status                     ---------                               -----------         ------                     Extra Blue Top Tube[[id number]]                              Final result                 Please view results for these tests on the individual orders.   Extra Blue Top Tube   Result Value Ref Range    Hold Specimen JIC    Glucose by meter   Result Value Ref Range    GLUCOSE BY METER POCT 20 (LL) 70 - 99 mg/dL   Glucose by meter   Result Value Ref Range    GLUCOSE BY METER POCT 90 70 - 99 mg/dL   XR Chest Port 1 View    Narrative    XR PORTABLE CHEST ONE VIEW   5/22/2023 11:45 AM     HISTORY: Central line placement.    COMPARISON: None.      Impression    IMPRESSION: Right neck central venous line tip within the mid right  atrium. Consider retraction of this line by approximately 2.9 cm for  placement at the cavoatrial junction. Left base atelectasis.  Hypoinflated lungs. Normal cardiac silhouette.    KENDRICK EATON MD         SYSTEM ID:  [id number]   Glucose by meter   Result Value Ref Range    GLUCOSE BY METER POCT 57 (L) 70 - 99 mg/dL   Lactic acid whole blood   Result Value Ref  Range    Lactic Acid 6.6 (HH) 0.7 - 2.0 mmol/L   Cell count with differential fluid    Narrative    The following orders were created for panel order Cell count with differential fluid.  Procedure                               Abnormality         Status                     ---------                               -----------         ------                     Cell Count Body Fluid[[id number]]        Abnormal            Final result               Differential Body Fluid[[id number]]                          Final result                 Please view results for these tests on the individual orders.   Albumin fluid   Result Value Ref Range    Albumin Fluid Source Paracentesis     Albumin fluid 0.3 g/dL    Narrative    No reference ranges have been established. This result should be interpreted in the context of the patient's clinical condition and compared to simultaneous measurement in the patient's blood. This is a lab developed test. It has not been cleared or approved by the FDA. FDA clearance is not required for clinical use.   Protein fluid   Result Value Ref Range    Protein Fluid Source Paracentesis     Protein Total Fluid 0.6 g/dL    Narrative    No reference ranges have been established. This result should be interpreted in the context of the patient's clinical condition and compared to simultaneous measurement in the patient's blood. This is a lab developed test. It has not been cleared or approved by the FDA. FDA clearance is not required for clinical use.   Cell Count Body Fluid   Result Value Ref Range    Color Yellow Colorless, Yellow    Clarity Turbid (A) Clear    Cell Count Fluid Source Paracentesis     Total Nucleated Cells 16,654 /uL    Narrative    No reference ranges have been established.  This result  should be interpreted in the context of the patient's clinical condition and   compared to simultaneous measurement in the patient's blood.         Differential Body Fluid   Result Value Ref Range    %  Neutrophils 96 %    % Lymphocytes 1 %    % Monocyte/Macrophages 3 %    Absolute Neutrophils, Body Fluid 96.0   /uL    Narrative    No reference ranges have been established. This result should be interpreted in the context of the patient's clinical condition and compared to simultaneous measurement in the patient's blood.   US Paracentesis without Albumin    Narrative    US PARACENTESIS WITHOUT ALBUMIN 5/22/2023 2:30 PM    CLINICAL HISTORY: E.coli bacteremia, ascites    PROCEDURE: Informed consent obtained. Time out performed. The abdomen  was prepped and draped in a sterile fashion. 10 mL of 1% lidocaine was  infused into local soft tissues. A 5 Swedish catheter system was  introduced into the abdominal ascites under ultrasound guidance.    1.4 liters of clear fluid were removed and sent to lab if requested.    Patient tolerated procedure well.    Ultrasound imaging was obtained and placed in the patient's permanent  medical record.      Impression    IMPRESSION:  1.  Status post ultrasound-guided paracentesis.    HOWARD WORKMAN MD         SYSTEM ID:  [id number]   Glucose by meter   Result Value Ref Range    GLUCOSE BY METER POCT 117 (H) 70 - 99 mg/dL   -Central Line    Narrative    Stewart Tidwell DO     5/22/2023  3:08 PM  [facility]    -Central Line    Date/Time: 5/22/2023 3:04 PM    Performed by: Stewart Tidwell DO  Authorized by: Stewart Tidwell DO    Risks, benefits and alternatives discussed.      UNIVERSAL PROTOCOL   Site Marked: Yes  Prior Images Obtained and Reviewed:  NA  Required items: Required blood products, implants, devices and special   equipment available    Patient identity confirmed:  Verbally with patient, arm band, provided   demographic data and hospital-assigned identification number  NA - No sedation, light sedation, or local anesthesia  Confirmation Checklist:  Patient's identity using two indicators, relevant   allergies, correct  equipment/implants were available and procedure was   appropriate and matched the consent or emergent situation  Time out: Immediately prior to the procedure a time out was called    Universal Protocol: the Joint Commission Universal Protocol was followed      PRE-PROCEDURE DETAILS:     Hand hygiene: Hand hygiene performed prior to insertion      Sterile barrier technique: All elements of maximal sterile technique   followed      Skin preparation:  ChloraPrep    Skin preparation agent: Skin preparation agent completely dried prior to   procedure         ANESTHESIA    Anesthesia: Local infiltration  Local Anesthetic:  Lidocaine 1% without epinephrine    PROCEDURE DETAILS:     Location:  R internal jugular    Patient position:  Trendelenburg    Procedural supplies:  Triple lumen    Catheter size:  7 Fr    Landmarks identified: yes      Ultrasound guidance: yes      Sterile ultrasound techniques: Sterile gel and sterile probe covers were   used      Number of attempts:  3    Successful placement: yes      POST PROCEDURE DETAILS:      Post-procedure:  Dressing applied and line sutured    Assessment:  Blood return through all ports, no pneumothorax on x-ray,   free fluid flow and placement verified by x-ray    PROCEDURE    Patient Tolerance:  Patient tolerated the procedure well with no immediate   complications      EKG results:   Performed on admission        Normal sinus rhythm, normal axis, no acute ischemic ST segment or T wave changes      All imaging studies reviewed by me.      Attestation:  I have reviewed today's vital signs, notes, medications, labs and imaging.     Jorje Prieto MD

## 2023-05-22 NOTE — ED PROVIDER NOTES
Patient contacted regarding positive blood cultures and recommendations to return to the ER for re-assessment and likely admission for IV antibiotics.  She and  verbalized understanding.     Humberto Preciado MD  05/22/23 [id number]

## 2023-05-22 NOTE — ED NOTES
BP remains 70's systolic infused albumin. Sleeping on and off arouses and is alert and orientated. Has not had any urine output so far since she arrived to ED.

## 2023-05-22 NOTE — PHARMACY-ADMISSION MEDICATION HISTORY
Pharmacist Admission Medication History    Admission medication history is complete. The information provided in this note is only as accurate as the sources available at the time of the update.    Medication reconciliation/reorder completed by provider prior to medication history? No    Information Source(s): Patient, Family member and CareEverywhere/SureScripts via in-person    Pertinent Information: None    Changes made to PTA medication list:    Added: Fish oil/Vit D, Furosemide, Spironolactone    Deleted: Vit D, Mag, Miralax    Changed:     1. Nadolol 20mg BID --> 40mg HS    2. Thyroid 40mg alternating 60mg --> 90mg daily    Medication Affordability:  Not including over the counter (OTC) medications, was there a time in the past 3 months when you did not take your medications as prescribed because of cost?: No    Allergies reviewed with patient and updates made in EHR: yes    Medication History Completed By: Angel Carr [facility] 5/22/2023 11:54 AM    Prior to Admission medications    Medication Sig Last Dose Taking? Auth Provider Long Term End Date   Fish Oil-Cholecalciferol (OMEGA-3 FISH OIL/VITAMIN D3 PO) Take by mouth daily 5/21/2023 at AM Yes Unknown, Entered By History     furosemide (LASIX) 20 MG tablet Take 20 mg by mouth Every Mon, Wed, Fri Morning 5/19/2023 at AM Yes Unknown, Entered By History Yes    nadolol (CORGARD) 20 MG tablet Take 40 mg by mouth At Bedtime 5/21/2023 at PM Yes Reported, Patient Yes    omeprazole (PRILOSEC) 40 MG DR capsule Take 1 capsule (40 mg) by mouth daily for 30 days Past Week Yes Finn Coats MD  6/16/23   QC ZINC PO Take by mouth 2 times daily 5/21/2023 Yes Unknown, Entered By History     spironolactone (ALDACTONE) 50 MG tablet Take 50 mg by mouth daily 5/21/2023 at AM Yes Unknown, Entered By History Yes    thyroid (ARMOUR) 90 MG tablet Take 90 mg by mouth daily 5/21/2023 at AM Yes Reported, Patient Yes    ursodiol (ACTIGALL) 500 MG tablet Take 500 mg by mouth 2  times daily 5/21/2023 at x2 Yes Unknown, Entered By History     VITAMIN A PO Take by mouth daily  Yes Unknown, Entered By History

## 2023-05-22 NOTE — PROGRESS NOTES
Patient tolerated Paracentesis well.  1400 mL of cloudy hope fluid drained done by Dr. Lehman. Dressing applied with no drainage. Procedure performed at bedside with Pt  present. Medications given per MAR. Report given verbally to bedside nurse, Keaton Godinez At bedside post procedure. Specimens walked to lab for analysis.

## 2023-05-22 NOTE — ED NOTES
Pt resting in bed with equal rise and fall of chest. Oxygen and HR WNL. Norep increased to 0.6 mcg/kg/min. SBP remains 70s. MAP 51.

## 2023-05-22 NOTE — ED NOTES
DATE/TIME OF CALL RECEIVED FROM LAB:  05/22/23 at 9:22 AM   LAB TEST:  BG   LAB VALUE:  40  PROVIDER NOTIFIED?: Yes  PROVIDER NAME: Tidwell  DATE/TIME LAB VALUE REPORTED TO PROVIDER: 9:23 AM    MECHANISM OF PROVIDER NOTIFICATION: Face-To-Face  PROVIDER RESPONSE: TUNG

## 2023-05-22 NOTE — ED TRIAGE NOTES
ABCs intact. Pt was seen yesterday in the ER for abdominal pain. Pt was called to come back d/t lab results.

## 2023-05-23 NOTE — PHARMACY-VANCOMYCIN DOSING SERVICE
"Pharmacy Vancomycin Initial Note  Date of Service May 22, 2023  Patient's  1950  73 year old, female    Indication: Bacteremia    Current estimated CrCl = Estimated Creatinine Clearance: 20 mL/min (A) (based on SCr of 2.21 mg/dL (H)).    Creatinine for last 3 days  2023:  3:56 PM Creatinine 1.68 mg/dL  2023:  8:41 AM Creatinine 2.21 mg/dL    Recent Vancomycin Level(s) for last 3 days  No results found for requested labs within last 3 days.      Vancomycin IV Administrations (past 72 hours)      No vancomycin orders with administrations in past 72 hours.                Nephrotoxins and other renal medications (From now, onward)    Start     Dose/Rate Route Frequency Ordered Stop    23 1400  vancomycin (VANCOCIN) 500 mg vial to attach to  mL bag         500 mg  over 1 Hours Intravenous EVERY 18 HOURS 23 01523  vancomycin (VANCOCIN) 1,500 mg in 0.9% NaCl 250 mL intermittent infusion         1,500 mg  over 90 Minutes Intravenous ONCE 23  norepinephrine (LEVOPHED) 16 mg in  mL infusion MAX CONC CENTRAL LINE         0.01-0.6 mcg/kg/min × 72.1 kg  0.7-40.6 mL/hr  Intravenous CONTINUOUS 23 1936      23 1900  piperacillin-tazobactam (ZOSYN) infusion 2.25 g        Note to Pharmacy: For [facility], [facility] and [facility]: For Zosyn-naive patients, use the \"Zosyn initial dose + extended infusion\" order panel.    2.25 g  100 mL/hr over 30 Minutes Intravenous EVERY 6 HOURS 23 1752      23 1740  vasopressin 0.2 units/mL in NS (PITRESSIN) standard conc infusion         2.4 Units/hr  12 mL/hr  Intravenous CONTINUOUS 23 1737            Contrast Orders - past 72 hours (72h ago, onward)    None          InsightRX Prediction of Planned Initial Vancomycin Regimen  Loading dose: 1500 mg at 20:00 2023.  Regimen: 500 mg IV every 18 hours.  Start time: 19:39 on 2023  Exposure target: AUC24 (range)400-600 mg/L.hr "   AUC24,ss: 504 mg/L.hr  Probability of AUC24 > 400: 75 %  Ctrough,ss: 18.2 mg/L  Probability of Ctrough,ss > 20: 41 %  Probability of nephrotoxicity (Lodise RAMY 2009): 14 %          Plan:  1. Start vancomycin  500 mg IV q18h with 1500mg IV load   2. Vancomycin monitoring method: AUC  3. Vancomycin therapeutic monitoring goal: 400-600 mg*h/L  4. Pharmacy will check vancomycin levels as appropriate in 1-3 Days.    5. Serum creatinine levels will be ordered daily for the first week of therapy and at least twice weekly for subsequent weeks.      Aron Osullivan RP

## 2023-05-23 NOTE — CONSULTS
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     Assessment:  1) DANDRE,anuric  In setting of hypotension, sepsis, E coli bacteremia, peritonitis, and cirrhosis. UA grossly abnormal, with hyaline and granular casts. High likelihood for ATN. Now anuric with rising lactate and persistent metabolic acidosis despite bicarb gtt, we will initiate CRRT. Discussed at length with her  that she is very tenuous, it is possible that she will not tolerate CRRT since she is already on 3 pressors. After extensive discussion with  and patient's primary GI doctor Dr. Daniel, patient would want all measures done. Discussed time-limited trial of CRRT to get her through acute phase. She has solitary kidney and had prolonged hypotension, possible that she will need long-term dialysis going forward which I would not offer given her cormorbidities and unlikelihood in improving quality or quantity of life, likely would worsen morbidity and mortality. Pt's  was understanding of this.   - short time limited trial of CRRT   - CRRT started 5/23    - appreciate ICU assistance in line placement    - pre-filter prismasol 2K 1000 ml/h    - dialysate prismasol 2K 1000 ml/h    - post-filter sodium bicarbonate (150 mEq/L) 200 ml/h    - no UF to start  - can discontinue peripheral IV bicarbonate once dialysis started   - continued GOC conversations     2) Septic shock due to E coli bacteremia   SBP   Currently on 3 pressors (2 of which are max dose). Fast growing E coli on blood cultures from 5/21. Peritoneal fluid growing GNB, likely source of bacteremia.     3) AGMA   Lactic acidosis, worsened   In setting of septic shock and liver  failure. Bicarb gtt started. PH maintaining ok with the bicarb, but with anuria, unlikely to be able to maintain pH with gtt alone. Will start CRRT as above.     4) CKD II   Solitary kidney   History of R nephrectomy   Baseline Cr 1-1.2 in setting of solitary kidney.     5) Acute liver failure  Cirrhosis due to PBC, decompensated   Coagulopathy   Slowly worsening cirrhosis since diagnosis with CLL. Dr. Daniel, primary hepatologist has been concerned that CLL may be affecting her liver since liver decompensation correlating with CLL diagnosis. Pt not a liver transplant candidate currently due to age, referrals to other centers have been placed. Previously when patient was a few years younger, liver transplant eval was pursed preemptively, but patient's health was too good at the time.     6) Leukocytosis   CLL   Follows at [facility].     7) Anemia   Thrombocytopenia   Chronic issues for patient due to liver disease.     Plan/Recs:  1) time limited trial of dialysis  2) will start CRRT today   3) once dialysis starts, can discontinue IV bicarb     Caroline Tirado MD   [facility]phrology  [phone]  --------------------------------------------------------------------------------------------  Reason for Consult     I was asked to see the patient for DANDRE in cirrhosis .    Primary Care Physician     Sally Cool    Chief Complaint     malaise    History is obtained from the patient's  and chart review.      History of Present Illness     Teresa De Santiago is a 73 year old female who presents with malaise and fatigue, abd pain.      provides all of history. He reports patient was feeling unwell, mostly malaise for last 2 weeks or so. Was evaluated in ED  and [place], did not have enough abd fluid for tap, labs ok and didn't seem acutely ill so discharged. Starting Saturday, she started feeling very ill, worsened malaise, abd pain. She went to ED on Sunday, but again discharged. Blood  cultures grew E coli, thus she was recommended to return to ED. She was notably hypotensive with elevated lactate, DANDRE, liver dysfunction. She underwent paracentesis with concern for SBP. Started empiric abx, given IVF. Due to persistent hypotension was started on pressors. Intubated as well. She is currently on three pressors.     Patient's  notes that she in general had been doing quite well. She gardens and was preparing her garden recently. He notes that she was working with horse manure, wonders if her infection is related to that. He is shocked that she became so sick so fast. He believes she would want to try everything including dialysis.     Talked with Dr. Daniel with patient's , she relayed that Ms. De Santiago always wanted more aggressive cares, always wanted to give things a chance.     Past Medical History   I have reviewed this patient's medical history and updated it with pertinent information if needed.   Past Medical History:   Diagnosis Date     Cirrhosis, primary biliary (H)      Thyroid disease 20's   CLL     Past Surgical History   I have reviewed this patient's surgical history and updated it with pertinent information if needed.  Past Surgical History:   Procedure Laterality Date     GALLBLADDER SURGERY       MASTECTOMY  1994     NEPHRECTOMY         Prior to Admission Medications   Prior to Admission Medications   Prescriptions Last Dose Informant Patient Reported? Taking?   Fish Oil-Cholecalciferol (OMEGA-3 FISH OIL/VITAMIN D3 PO) 5/21/2023  Yes Yes   Sig: Take by mouth daily   QC ZINC PO 5/21/2023  Yes Yes   Sig: Take by mouth 2 times daily   VITAMIN A PO   Yes Yes   Sig: Take by mouth daily   furosemide (LASIX) 20 MG tablet 5/19/2023 at AM  Yes Yes   Sig: Take 20 mg by mouth Every Mon, Wed, Fri Morning   nadolol (CORGARD) 20 MG tablet 5/21/2023 at PM  Yes Yes   Sig: Take 40 mg by mouth At Bedtime   omeprazole (PRILOSEC) 40 MG DR capsule Past Week  No Yes   Sig: Take 1 capsule (40  mg) by mouth daily for 30 days   spironolactone (ALDACTONE) 50 MG tablet 5/21/2023 at AM  Yes Yes   Sig: Take 50 mg by mouth daily   thyroid (ARMOUR) 90 MG tablet 5/21/2023  Yes Yes   Sig: Take 90 mg by mouth daily   ursodiol (ACTIGALL) 500 MG tablet 5/21/2023 at x2  Yes Yes   Sig: Take 500 mg by mouth 2 times daily      Facility-Administered Medications: None     Allergies   Allergies   Allergen Reactions     Contrast Dye Hives       Social History   I have reviewed this patient's social history and updated it with pertinent information if needed. Teresa De Santiago  reports that she has never smoked. She has never used smokeless tobacco. She reports that she does not drink alcohol and does not use drugs.    Family History   I have reviewed this patient's family history and updated it with pertinent information if needed.   Family History   Problem Relation Age of Onset     Cancer Mother      Heart Disease Mother      Osteoporosis Mother      Cerebrovascular Disease Mother      Seizure Disorder Mother      Thyroid Disease Mother      High cholesterol Other         sibling     Thyroid Disease Other         sibling       Review of Systems   Unable to assess d/t patient intubated     Physical Exam   Temp: 98.4  F (36.9  C) Temp src: Bladder BP: (!) 86/49 Pulse: 71   Resp: 22 SpO2: 96 % O2 Device: Mechanical Ventilator    Vital Signs with Ranges  Temp:  [98.2  F (36.8  C)-101.3  F (38.5  C)] 98.4  F (36.9  C)  Pulse:  [64-95] 71  Resp:  [12-32] 22  BP: ()/(24-87) 86/49  MAP:  [23 mmHg-225 mmHg] 71 mmHg  Arterial Line BP: ()/() 129/42  FiO2 (%):  [50 %-100 %] 50 %  SpO2:  [34 %-100 %] 96 %  172 lbs 2.87 oz    GENERAL: intubated, sedated, NAD   HEENT:  ETT in place  CV: RRR, soft murmur  RESP: ventilatory breath sounds   GI: distended but soft   MUSCULOSKELETAL: warm extremities, trace edema  SKIN: no suspicious lesions or rashes, dry to touch  NEURO: sedated  : guerra with few ml's of concentrated urine      Data   BMP  Recent Labs   Lab 05/23/23  0746 05/23/23  0556 05/23/23  0430 05/23/23  0016 05/23/23  0012 05/22/23 2216 05/22/23  0918 05/22/23  0841   NA  --  133*  --  133*  --  133*  --  130*   POTASSIUM  --  5.2  --  4.7  --  4.7  --  5.0   CHLORIDE  --  100  --  100  --  100  --  99   SONYA  --  8.7*  --  8.3*  --  8.1*  --  8.8   CO2  --  12*  --  11*  --  12*  --  16*   BUN  --  54.9*  --  56.4*  --  56.3*  --  50.7*   CR  --  2.82*  --  2.58*  --  2.50*  --  2.21*   * 133* 111* 126*   < > 129*   < > 40*    < > = values in this interval not displayed.     Phos@LABRCNTIPR(phos:4)  CBC)  Recent Labs   Lab 05/23/23  0556 05/23/23  0016 05/22/23 2216 05/22/23  0841   WBC 56.7* 44.0* 39.4* 18.4*   HGB 8.4* 8.3* 8.2* 10.3*   HCT 26.9* 25.8* 24.6* 30.8*   * 100 99 99   PLT 59* 53* 53* 32*     Recent Labs   Lab 05/23/23  0556   AST 61*   ALT 44*   ALKPHOS 128*   BILITOTAL 4.7*     Recent Labs   Lab 05/23/23  0556   INR 3.42*     No results found for: D2VIT, D3VIT, DTOT  Recent Labs   Lab 05/23/23  0556   HGB 8.4*   HCT 26.9*   *     No results for input(s): PTHI in the last 168 hours.     CT abd/pelvis   LOWER CHEST: Linear atelectasis or scar left lower lobe. There are likely esophageal varices.     HEPATOBILIARY: Small nodular cirrhotic liver. No focal mass suggested. Prior cholecystectomy. Surrounding ascites.     PANCREAS: Normal.     SPLEEN: Mild splenomegaly.     ADRENAL GLANDS: No adrenal mass evident.     KIDNEYS/BLADDER: Prior right nephrectomy. Left kidney unremarkable.     BOWEL: Moderate diffuse ascites. Soft tissue stranding and apparent congestion throughout mesentery. No definite intrinsic inflammatory focus. No obstruction.     LYMPH NODES: Normal.     VASCULATURE: There are likely esophageal and gastric varices. No arterial abnormalities evident.     PELVIC ORGANS: No pelvic mass. Ascites.     MUSCULOSKELETAL: No suspicious bony lesion.                                                                       IMPRESSION:   1.  Advanced cirrhosis with portal hypertension, splenomegaly, paraesophageal varices and moderate ascites.  2.  Prior cholecystectomy. No suggestion for biliary duct obstruction on this noncontrast exam.    Caroline Tirado MD   [facility] - Nephrology  [phone]

## 2023-05-23 NOTE — PHARMACY-VANCOMYCIN DOSING SERVICE
"Pharmacy Vancomycin Note  Date of Service May 23, 2023  Patient's  1950   73 year old, female    Indication: Bacteremia  Day of Therapy: 2  Current vancomycin regimen:  1500 mg IV x 1, then follow levels  Current vancomycin monitoring method: Trough (Method 2 = manual dose calculation)  Current vancomycin therapeutic monitoring goal: 15-20 mg/L    InsightRX Prediction of Current Vancomycin Regimen  N/A    Current estimated CrCl = Estimated Creatinine Clearance: 15.1 mL/min (A) (based on SCr of 3.04 mg/dL (H)).    Creatinine for last 3 days  2023:  3:56 PM Creatinine 1.68 mg/dL  2023:  8:41 AM Creatinine 2.21 mg/dL; 10:16 PM Creatinine 2.50 mg/dL  2023: 12:16 AM Creatinine 2.58 mg/dL;  5:56 AM Creatinine 2.82 mg/dL; 11:54 AM Creatinine 3.04 mg/dL    Recent Vancomycin Levels (past 3 days)  2023: 11:54 AM Vancomycin 16.1 ug/mL    Vancomycin IV Administrations (past 72 hours)                   vancomycin (VANCOCIN) 1,500 mg in 0.9% NaCl 250 mL intermittent infusion (mg) 1,500 mg New Bag 23                Nephrotoxins and other renal medications (From now, onward)    Start     Dose/Rate Route Frequency Ordered Stop    23 1800  vancomycin (VANCOCIN) 1,500 mg in 0.9% NaCl 250 mL intermittent infusion         1,500 mg  over 90 Minutes Intravenous ONCE 23 1734      23 0801  vancomycin place nicole - receiving intermittent dosing         1 each Intravenous SEE ADMIN INSTRUCTIONS 23 0802      23 2000  norepinephrine (LEVOPHED) 16 mg in  mL infusion MAX CONC CENTRAL LINE         0.01-0.6 mcg/kg/min × 72.1 kg  0.7-40.6 mL/hr  Intravenous CONTINUOUS 23 1936      23 1900  piperacillin-tazobactam (ZOSYN) infusion 2.25 g        Note to Pharmacy: For [facility], [facility] and [facility]: For Zosyn-naive patients, use the \"Zosyn initial dose + extended infusion\" order panel.    2.25 g  100 mL/hr over 30 Minutes Intravenous EVERY 6 HOURS 23 1752      23 1743 "  vasopressin 0.2 units/mL in NS (PITRESSIN) standard conc infusion         2.4 Units/hr  12 mL/hr  Intravenous CONTINUOUS 05/22/23 1737               Contrast Orders - past 72 hours (72h ago, onward)    Start     Dose/Rate Route Frequency Stop    05/23/23 0900  perflutren diluted 1mL to 2mL with saline (OPTISON) diluted injection 3 mL         3 mL Intravenous ONCE 05/23/23 0844          Interpretation of levels and current regimen:  Vancomycin level is reflective of therapeutic level    Has serum creatinine changed greater than 50% in last 72 hours: No    Urine output:  unable to determine    Renal Function: CRRT    InsightRX Prediction of Planned New Vancomycin Regimen  Not applicable    Plan:  1. Redose with 1500mg IV x1  2. Vancomycin monitoring method: Renal Replacement Therapy  3. Vancomycin therapeutic monitoring goal: 15-20 mg/L  4. Pharmacy will check vancomycin levels as appropriate in 1-3 Days.  5. Serum creatinine levels will be ordered daily for the first week of therapy and at least twice weekly for subsequent weeks.    Aron Osullivan RPH

## 2023-05-23 NOTE — PROGRESS NOTES
DATE/TIME OF CALL RECEIVED FROM LAB:  05/23/23 at 6:31 PM   LAB TEST:  Lactic acid  LAB VALUE:  13.2    PROVIDER NOTIFIED?: Yes  PROVIDER NAME: ludwig  DATE/TIME LAB VALUE REPORTED TO PROVIDER:   MECHANISM OF PROVIDER NOTIFICATION: Phone Call  PROVIDER RESPONSE: no change in cares

## 2023-05-23 NOTE — CONSULTS
Palliative consult noted. Spouse requested a visit tomorrow. Will plan to follow up with a consult on 5/24.     Electronically signed by  Agata Saenz CNP

## 2023-05-23 NOTE — PROGRESS NOTES
Formerly [facility], [facility] ICU VENTILATOR RESPIRATORY NOTE  Date of Admission: 5/22/23  Date of Intubation (most recent): 5/22/23  Reason for Mechanical Ventilation: Respiratory failure  Number of Days on Mechanical Ventilation: 2  Met Criteria for Pressure Support Trial: No  Reason for No Pressure Support Trial: Multiple pressors  Vent Mode: CMV/AC  (Continuous Mandatory Ventilation/ Assist Control)  FiO2 (%): 50 %  Resp Rate (Set): 22 breaths/min  Tidal Volume (Set, mL): 430 mL  PEEP (cm H2O): 5 cmH2O  Resp: 19    Significant Events Today: Intubated  ABG Results:   Recent Labs   Lab 05/23/23  0015 05/22/23  2140 05/22/23  1954 05/22/23  0841   PH 7.28* 7.29* 7.28* 7.32   PCO2 26* 26* 33*  --    PO2 84 99 91  --    HCO3 12* 12* 15*  --    O2PER 50 60 60  --      ETT appearance on chest x-ray: EXAM: XR CHEST PORT 1 VIEW  LOCATION: [facility]  DATE/TIME: 5/22/2023 7:15 PM CDT     INDICATION: Endotracheal tube positioning.  COMPARISON: 5/22/2023 11:36 AM                                                                      IMPRESSION: Patient has been intubated with tube tip 3.8 cm above the evelio. Right IJ central venous catheter tip RA/SVC junction. Nasogastric tube enters the stomach with tip inferior to the radiographic field of view. Minimal bibasilar pleural fluid   and atelectasis. Heart size and pulmonary vascularity within normal limits.    Plan:  Continue to monitor    RT Dunia on 5/23/2023 at 4:51 AM

## 2023-05-23 NOTE — PROGRESS NOTES
Teresa De Santiago is a 73 year old female was called back to the ED on 5/22/2023 due to blood cultures turned positive that had been drawn when she first presented with malaise, belly distension, and belly pain on 5/21/23. She was hypotensive and hypoglycemic on presentation to the ER. She underwent paracentesis that eventually has proven to be the source of infection. She was started on broad spectrum abx, norepinephrine, and endotracheally intubated.     Medical history is notable for primary biliary cirrhosis that is currently followed at [facility].  Her hepatologist, Dr. Haley Cool's documentation indicates that she is no longer being considered for liver transplant.  She was diagnosed in March with CLL but is not on any therapy for that.  Remote history is notable for right nephrectomy for benign indication.  Her baseline creatinine is about 1.1.      Neuro: Patient is currently being sedated with versed and fentanyl. Goal RASS 0 to -2, unless this interferes with ventilation/oxygenation/hemodynamics. In that case, we will target a lower RASS.    Pulm: Currently with low vent requirements. CXR shows likely small pleff left base with adjacent opacity. Will watch.    CV: Profound septic shock. Rising lactate. 0.6 NE, 0.9 epi, vaso 2.4 straight rate. On steroids for refractory shock.    GI: Profound shock prevents enteral feeding for now. Will follow and feed as able. Slight elevation in transaminases, alk phos, bili. SBP with E coli growth from fluid. Given albumin and antibiotics.    Renal: Anuric. Hyperkalemia. Acidemia. CRRT initiated today with no volume removal. Clearance only for now.    Endo: Will watch for hypoglycemia and treat    ID: E. Coli bacteremia. E coli in ascites. Pansensitive but given patient's severely ill state will continue with Zosyn. MRSA swab sent. If negative will discharge vancomycin.    Heme: Platelets low, INR high, fibrinogen low. Likely 2/2 sepsis/DIC. Given cryo today. WBC very  increased. Does have CLL but today is around 60 and was 18 several days ago. C/w her overwhelming sepsis state.    PPx: VTE, GI, VAE    I spoke at length with her  and son today who were agreeable to plan of care and expressed appreciation of our team's care.    65 min of critical care time spent by me in caring for this patient exclusive of procedures.    Critical Care Problems:  Acute respiratory failure  Refractory septic shock  Ecoli bacteremia  Lactic acidosis - profound  DANDRE necessitating CRRT

## 2023-05-23 NOTE — PROGRESS NOTES
An ABG was completed on the pt's L radial @ 1950 on an FIO2 of 60  with no complications noted during the procedure.    Aron Amin, RT on 5/22/2023 at 7:56 PM

## 2023-05-23 NOTE — PROGRESS NOTES
Right wrist and Left wrist restraints initiated on patient on 5/23/2023 at 1:25 AM    Clinical Justification: Pulling lines, pulling tubes, and pulling equipment     Attending Physician Notified: Yes, Attending Physician's Name: Dr. Prieto   Order received: Yes     Family Notification: Spouse/significant other   Criteria explained to Patient  Patient's Response: No evidence of learning  Restraint care Plan initiated: Yes    Jed Garcia RN

## 2023-05-23 NOTE — CARE PLAN
Right wrist and Left wrist restraints discontinued at 9:16 AM on 5/23/2023.    Restraint discontinue criteria met, patient is calm, safe ans sedated. Restraints removed.     Patient's Response: No evidence of learning  Family Notification: Spouse/significant other  Attending Physician Notified: Yes, Attending Physician's Name: Dr. Ida Patrick, RN

## 2023-05-23 NOTE — PROGRESS NOTES
Right wrist and Left wrist restraints continued 5/23/2023    Clinical Justification: Pulling lines, pulling tubes, and pulling equipment     Attending Physician Notified: Yes, Attending Physician's Name: Dr. Prieto   New orders placed No  Length of Order: 1 Day      Jed Garcia [id number]

## 2023-05-23 NOTE — PROGRESS NOTES
-Severe metabolic acidosis, worsening renal function. On 3 pressers.   -Nephrology consult placed.   - Will switch her bicarb concentration.      Called and updated  of poor prognosis. recommended to come to hospital early morning and discuss with primary intensivit regarding goals of care and code status.     Hem Teran.

## 2023-05-23 NOTE — PROGRESS NOTES
Called for urgent A line placement in septic patient on multiple pressors.    Previous L Radial ABG successful. No palpable pulse. L Radial A line attempted after chloroprep and local infiltration with lido 1% x 1cc unsuccessful.  L groin prepped with chloroprep and infiltrated with lido 1% x 2cc. Ultrasound guidance for 12 cm 20 gu catheter placement. Secured at skin with suture and biopatch. Dressing per RN. Good waveform. No apparent complications. Yulisa

## 2023-05-23 NOTE — PROGRESS NOTES
[facility]    Medicine Progress Note - Hospitalist Service    Date of Admission:  5/22/2023    Assessment & Plan   Teresa De Santiago is a 73 year old female was called back to the ED on 5/22/2023 due to blood cultures turned positive that had been drawn when she first presented with malaise on 5/21/23.  On arrival to the emergency department, her initial blood pressure was 71/31.  She was resuscitated with 2 L of crystalloid.  Subsequent to that she was found to be hypoglycemic and was given an amp of D50.  Due to persisting hypotension, norepinephrine was initiated.  She underwent paracentesis that eventually has proven to be the source of infection.  She was started on Zosyn and although I thought to switch that to ceftriaxone, because she became so much sicker, we went back to Zosyn with vancomycin.    Medical history is notable for primary biliary cirrhosis that is currently followed at [facility].  Her hepatologist, Dr. Haley Cool's documentation indicates that she is no longer being considered for liver transplant.  She was diagnosed in March with CLL but is not on any therapy for that.  Remote history is notable for right nephrectomy for benign indication.  Her baseline creatinine is about 1.1.      Ms. De Santiago had a very difficult night after admission.  She had spent a long time in the emergency department waiting for the ICU bed and unfortunately deteriorated during that time.  By the time she was admitted to the ICU had about 5:30 in the evening, she was nearly obtunded and required the addition of a second and subsequently a third pressor.  She was aggressively resuscitated throughout the night but unfortunately became anuric.    Nephrology, Dr. Tirado, has consented to initiation of CRRT although she recognizes and stated to the patient's  that the patient is high risk of decompensating while on renal replacement therapy.  I had also spoken with the patients   "at great length regarding not only the risk of proceeding with dialysis but also the extremely long course that is expected if she has a positive result from CRRT.  The patient would need to go on to intermittent hemodialysis and if she is yunier enough to recover function, she will still have a lot of therapy to do in order to recover independence.    I talked with Dr. Cool by telephone this morning.  She asked that future calls be directed to her pager: [phone].  She is essentially the patient's primary physician and advocated for aggressive care for Ms. De Santiago.  She states that the patient is, at baseline independent and \"stronger than she looks\".  She also recognizes that this is an abrupt and sudden change for the family.    DX:  1.  Septic shock due to E. coli bacteremia.  Source is SBP.  Requiring 3 pressors: Norepinephrine, epinephrine and vasopressin.  2.  Acute renal failure with anuria.  Unilateral kidney s/p remote right nephrectomy.  Suspect a component of HRS and ATN.  3.  Chronic liver failure due to primary biliary cirrhosis.  4.  CLL with very elevated WBC.  Associated chronic anemia and chronic thrombocytopenia.    Neuro: Sedated on the vent with midazolam and fentanyl.  Pulmonary: Vent: CMV 22/Vt 430/FiO2 50/PEEP 5.  ABG pH 7.25/PCO2 27/PO2 88/HCO3 12.  Cardiovascular: Requiring norepinephrine (maxed out), vasopressin and epinephrine drips at this time.  GI/Nutrition: Cirrhosis with ascites.  Obviously infected ascites noted on paracentesis.  Renal: Currently anuric, creat rising.  Baseline creatinine about 1.1 with one kidney, s/p remote right nephrectomy.  -Nephrology to initiate CRRT this evening.  We will empirically give another 100 g of 25% albumin IV now.  ID/Heme:  Pan-sensitive E coli in blood and ascitic fluid.   - CLL, chronic anemia and now acute on chronic thrombocytopenia.    - fixed coagulopathy. Was treated with      Diet:   NPO  DVT Prophylaxis: Pneumatic Compression " "Devices  Cuevas Catheter: PRESENT, indication: Strict 1-2 Hour I&O  Lines: PRESENT      CVC Double Lumen Right Internal jugular-Site Assessment: WDL  Arterial Line 05/22/23 Femoral-Site Assessment: WDL      Cardiac Monitoring: ACTIVE order. Indication: ICU  Code Status: Full Code      Clinically Significant Risk Factors Present on Admission          # Hypocalcemia: Lowest iCa = 4.2 mg/dL in last 2 days, will monitor and replace as appropriate  # Hypercalcemia: corrected calcium is >10.1, will monitor as appropriate   # Anion Gap Metabolic Acidosis: Highest Anion Gap = 22 mmol/L in last 2 days, will monitor and treat as appropriate  # Hypoalbuminemia: Lowest albumin = 2.3 g/dL at 5/22/2023  8:41 AM, will monitor as appropriate  # Coagulation Defect: INR = 3.42 (Ref range: 0.85 - 1.15) and/or PTT = 63 Seconds (Ref range: 22 - 38 Seconds), will monitor for bleeding  # Thrombocytopenia: Lowest platelets = 25 in last 2 days, will monitor for bleeding  # Acute Kidney Injury, unspecified: based on a >150% or 0.3 mg/dL increase in last creatinine compared to past 90 day average, will monitor renal function    # Circulatory Shock: currently requiring pressors for blood pressure support  # Acute Respiratory Failure: Documented O2 saturation < 91%.  Continue supplemental oxygen as needed     # Obesity: Estimated body mass index is 32.53 kg/m  as calculated from the following:    Height as of 5/21/23: 1.549 m (5' 1\").    Weight as of this encounter: 78.1 kg (172 lb 2.9 oz).            Disposition Plan     Expected Discharge Date: 05/24/2023                  Jorje Prieto MD  Hospitalist Service  [facility]  Securely message with [employer] (more info)  Text page via [facility] Paging/Directory   ______________________________________________________________________    Interval History   Difficult night as noted. Pt intubated and sedated, unable to communicate.     Physical Exam   Vital Signs: Temp: 98.4  F (36.9 "  C) Temp src: Bladder BP: (!) 86/49 Pulse: 67   Resp: 21 SpO2: 94 % O2 Device: Mechanical Ventilator    Weight: 172 lbs 2.87 oz    Constitutional: Sedated and unresponsive. Orally intubated w ETT and OG.   Hematologic / Lymphatic: no cervical lymphadenopathy and no supraclavicular lymphadenopathy  Respiratory: breathin on vent.  Clear to auscultation anteirorly  Cardiovascular: RRR soft systolic ejection murmur at the aortic area  GI: Soft, nondistended.  Not obviously tender  Skin: no jaundice and anasarca  Musculoskeletal: Fingers and toes are well perfused.  Neurologic: Sedated, unresponsive.    Medical Decision Making       60 MINUTES SPENT BY ME on the date of service doing chart review, history, exam, documentation & further activities per the note.      Data   Results for orders placed or performed during the hospital encounter of 05/22/23   -Central Line     Status: None    Narrative    Stewart Tidwell DO     5/22/2023  3:08 PM  [facility]    -Central Line    Date/Time: 5/22/2023 3:04 PM    Performed by: Stewart Tidwell DO  Authorized by: Stewart Tidwell DO    Risks, benefits and alternatives discussed.      UNIVERSAL PROTOCOL   Site Marked: Yes  Prior Images Obtained and Reviewed:  NA  Required items: Required blood products, implants, devices and special   equipment available    Patient identity confirmed:  Verbally with patient, arm band, provided   demographic data and hospital-assigned identification number  NA - No sedation, light sedation, or local anesthesia  Confirmation Checklist:  Patient's identity using two indicators, relevant   allergies, correct equipment/implants were available and procedure was   appropriate and matched the consent or emergent situation  Time out: Immediately prior to the procedure a time out was called    Universal Protocol: the Joint Commission Universal Protocol was followed      PRE-PROCEDURE DETAILS:     Hand hygiene: Hand  hygiene performed prior to insertion      Sterile barrier technique: All elements of maximal sterile technique   followed      Skin preparation:  ChloraPrep    Skin preparation agent: Skin preparation agent completely dried prior to   procedure         ANESTHESIA    Anesthesia: Local infiltration  Local Anesthetic:  Lidocaine 1% without epinephrine    PROCEDURE DETAILS:     Location:  R internal jugular    Patient position:  Trendelenburg    Procedural supplies:  Triple lumen    Catheter size:  7 Fr    Landmarks identified: yes      Ultrasound guidance: yes      Sterile ultrasound techniques: Sterile gel and sterile probe covers were   used      Number of attempts:  3    Successful placement: yes      POST PROCEDURE DETAILS:      Post-procedure:  Dressing applied and line sutured    Assessment:  Blood return through all ports, no pneumothorax on x-ray,   free fluid flow and placement verified by x-ray    PROCEDURE    Patient Tolerance:  Patient tolerated the procedure well with no immediate   complications   US Paracentesis without Albumin     Status: None    Narrative    US PARACENTESIS WITHOUT ALBUMIN 5/22/2023 2:30 PM    CLINICAL HISTORY: E.coli bacteremia, ascites    PROCEDURE: Informed consent obtained. Time out performed. The abdomen  was prepped and draped in a sterile fashion. 10 mL of 1% lidocaine was  infused into local soft tissues. A 5 Thai catheter system was  introduced into the abdominal ascites under ultrasound guidance.    1.4 liters of clear fluid were removed and sent to lab if requested.    Patient tolerated procedure well.    Ultrasound imaging was obtained and placed in the patient's permanent  medical record.      Impression    IMPRESSION:  1.  Status post ultrasound-guided paracentesis.    HOWARD WORKMAN MD         SYSTEM ID:  [id number]   XR Chest Port 1 View     Status: None    Narrative    XR PORTABLE CHEST ONE VIEW   5/22/2023 11:45 AM     HISTORY: Central line placement.    COMPARISON:  None.      Impression    IMPRESSION: Right neck central venous line tip within the mid right  atrium. Consider retraction of this line by approximately 2.9 cm for  placement at the cavoatrial junction. Left base atelectasis.  Hypoinflated lungs. Normal cardiac silhouette.    KENDRICK EATON MD         SYSTEM ID:  [id number]   XR Chest Port 1 View     Status: None    Narrative    EXAM: XR CHEST PORT 1 VIEW  LOCATION: [facility]  DATE/TIME: 5/22/2023 7:15 PM CDT    INDICATION: Endotracheal tube positioning.  COMPARISON: 5/22/2023 11:36 AM      Impression    IMPRESSION: Patient has been intubated with tube tip 3.8 cm above the evelio. Right IJ central venous catheter tip RA/SVC junction. Nasogastric tube enters the stomach with tip inferior to the radiographic field of view. Minimal bibasilar pleural fluid   and atelectasis. Heart size and pulmonary vascularity within normal limits.   XR Abdomen Port 1 View     Status: None    Narrative    EXAM: XR ABDOMEN PORT 1 VIEW  LOCATION: [facility]  DATE/TIME: 5/22/2023 7:16 PM CDT    INDICATION: Verify OG placement.  COMPARISON: 05/21/2023 CT AP.      Impression    IMPRESSION: Nasogastric tube tip in the mid stomach. Mild gaseous distention of the stomach. Bowel gas pattern is otherwise normal. Nothing for obstruction or free air. Surgical clips in the right upper quadrant. Minimal bibasilar pleural fluid and   atelectasis.   Comprehensive metabolic panel     Status: Abnormal   Result Value Ref Range    Sodium 130 (L) 136 - 145 mmol/L    Potassium 5.0 3.4 - 5.3 mmol/L    Chloride 99 98 - 107 mmol/L    Carbon Dioxide (CO2) 16 (L) 22 - 29 mmol/L    Anion Gap 15 7 - 15 mmol/L    Urea Nitrogen 50.7 (H) 8.0 - 23.0 mg/dL    Creatinine 2.21 (H) 0.51 - 0.95 mg/dL    Calcium 8.8 8.8 - 10.2 mg/dL    Glucose 40 (LL) 70 - 99 mg/dL    Alkaline Phosphatase 268 (H) 35 - 104 U/L    AST 98 (H) 10 - 35 U/L    ALT 74 (H) 10 - 35 U/L    Protein Total 5.2  "(L) 6.4 - 8.3 g/dL    Albumin 2.3 (L) 3.5 - 5.2 g/dL    Bilirubin Total 5.3 (H) <=1.2 mg/dL    GFR Estimate 23 (L) >60 mL/min/1.73m2   CBC with platelets and differential     Status: Abnormal   Result Value Ref Range    WBC Count 18.4 (H) 4.0 - 11.0 10e3/uL    RBC Count 3.12 (L) 3.80 - 5.20 10e6/uL    Hemoglobin 10.3 (L) 11.7 - 15.7 g/dL    Hematocrit 30.8 (L) 35.0 - 47.0 %    MCV 99 78 - 100 fL    MCH 33.0 26.5 - 33.0 pg    MCHC 33.4 31.5 - 36.5 g/dL    RDW 18.0 (H) 10.0 - 15.0 %    Platelet Count 32 (LL) 150 - 450 10e3/uL    Narrative    Previously reported component [ NRBCs ] is no longer reported.\"  Previously reported component [ NRBCs Absolute ] is no longer reported.\"   Albumin fluid     Status: None   Result Value Ref Range    Albumin Fluid Source Paracentesis     Albumin fluid 0.3 g/dL    Narrative    No reference ranges have been established. This result should be interpreted in the context of the patient's clinical condition and compared to simultaneous measurement in the patient's blood. This is a lab developed test. It has not been cleared or approved by the FDA. FDA clearance is not required for clinical use.   Protein fluid     Status: None   Result Value Ref Range    Protein Fluid Source Paracentesis     Protein Total Fluid 0.6 g/dL    Narrative    No reference ranges have been established. This result should be interpreted in the context of the patient's clinical condition and compared to simultaneous measurement in the patient's blood. This is a lab developed test. It has not been cleared or approved by the FDA. FDA clearance is not required for clinical use.   Extra Tube ([place] Draw)     Status: None    Narrative    The following orders were created for panel order Extra Tube ([place] Draw).  Procedure                               Abnormality         Status                     ---------                               -----------         ------                     Extra Red Top Tube[[id number]]    "                            Final result               Extra Green Top (Lithium...[[id number]]                                                   Please view results for these tests on the individual orders.   Extra Red Top Tube     Status: None   Result Value Ref Range    Hold Specimen JIC    Extra Tube ([place] Draw)     Status: None    Narrative    The following orders were created for panel order Extra Tube ([place] Draw).  Procedure                               Abnormality         Status                     ---------                               -----------         ------                     Extra Blue Top Tube[[id number]]                              Final result                 Please view results for these tests on the individual orders.   Extra Blue Top Tube     Status: None   Result Value Ref Range    Hold Specimen JI    iStat Gases (lactate) venous, POCT     Status: Abnormal   Result Value Ref Range    Lactic Acid POCT 5.5 (HH) <=2.0 mmol/L    Bicarbonate Venous POCT 17 (L) 21 - 28 mmol/L    O2 Sat, Venous POCT 49 (L) 94 - 100 %    pCO2V Venous POCT 33 (L) 40 - 50 mm Hg    pH Venous POCT 7.32 7.32 - 7.43    pO2 Venous POCT 28 25 - 47 mm Hg   Glucose by meter     Status: Abnormal   Result Value Ref Range    GLUCOSE BY METER POCT 20 (LL) 70 - 99 mg/dL   Glucose by meter     Status: Normal   Result Value Ref Range    GLUCOSE BY METER POCT 90 70 - 99 mg/dL   Lactic acid whole blood     Status: Abnormal   Result Value Ref Range    Lactic Acid 6.6 (HH) 0.7 - 2.0 mmol/L   Manual Differential     Status: Abnormal   Result Value Ref Range    % Neutrophils 14 %    % Lymphocytes 80 %    % Monocytes 3 %    % Eosinophils 1 %    % Basophils 0 %    % Metamyelocytes 2 %    Absolute Neutrophils 2.6 1.6 - 8.3 10e3/uL    Absolute Lymphocytes 14.7 (H) 0.8 - 5.3 10e3/uL    Absolute Monocytes 0.6 0.0 - 1.3 10e3/uL    Absolute Eosinophils 0.2 0.0 - 0.7 10e3/uL    Absolute Basophils 0.0 0.0 - 0.2 10e3/uL    Absolute  Metamyelocytes 0.4 (H) <=0.0 10e3/uL    RBC Morphology Confirmed RBC Indices     Platelet Assessment  Automated Count Confirmed. Platelet morphology is normal.     Automated Count Confirmed. Platelet morphology is normal.    Smudge Cells Present (A) None Seen   Glucose by meter     Status: Abnormal   Result Value Ref Range    GLUCOSE BY METER POCT 57 (L) 70 - 99 mg/dL   Cell Count Body Fluid     Status: Abnormal   Result Value Ref Range    Color Yellow Colorless, Yellow    Clarity Turbid (A) Clear    Cell Count Fluid Source Paracentesis     Total Nucleated Cells 16,654 /uL    Narrative    No reference ranges have been established.  This result  should be interpreted in the context of the patient's clinical condition and   compared to simultaneous measurement in the patient's blood.         Differential Body Fluid     Status: None   Result Value Ref Range    % Neutrophils 96 %    % Lymphocytes 1 %    % Monocyte/Macrophages 3 %    Absolute Neutrophils, Body Fluid 96.0   /uL    Narrative    No reference ranges have been established. This result should be interpreted in the context of the patient's clinical condition and compared to simultaneous measurement in the patient's blood.   Sodium random urine     Status: None   Result Value Ref Range    Sodium Urine mmol/L 20 mmol/L   Glucose by meter     Status: Abnormal   Result Value Ref Range    GLUCOSE BY METER POCT 117 (H) 70 - 99 mg/dL   Cortisol     Status: None   Result Value Ref Range    Cortisol 68.7   ug/dL   Glucose by meter     Status: Abnormal   Result Value Ref Range    GLUCOSE BY METER POCT 110 (H) 70 - 99 mg/dL   UA with Microscopic reflex to Culture     Status: Abnormal    Specimen: Urine, Catheter   Result Value Ref Range    Color Urine Dark Yellow (A) Colorless, Straw, Light Yellow, Yellow    Appearance Urine Slightly Cloudy (A) Clear    Glucose Urine Negative Negative mg/dL    Bilirubin Urine Small (A) Negative    Ketones Urine Trace (A) Negative mg/dL     Specific Gravity Urine 1.026 1.003 - 1.035    Blood Urine Trace (A) Negative    pH Urine 5.0 5.0 - 7.0    Protein Albumin Urine 30 (A) Negative mg/dL    Urobilinogen Urine Normal Normal, 2.0 mg/dL    Nitrite Urine Negative Negative    Leukocyte Esterase Urine Negative Negative    Mucus Urine Present (A) None Seen /LPF    RBC Urine 2 <=2 /HPF    WBC Urine 3 <=5 /HPF    Squamous Epithelials Urine 1 <=1 /HPF    Hyaline Casts Urine 33 (H) <=2 /LPF    Granular Casts Urine 10 (H) None Seen /LPF    Narrative    Urine Culture not indicated   Lactic acid whole blood     Status: Abnormal   Result Value Ref Range    Lactic Acid 7.9 (HH) 0.7 - 2.0 mmol/L   Blood gas arterial     Status: Abnormal   Result Value Ref Range    pH Arterial 7.28 (L) 7.35 - 7.45    pCO2 Arterial 33 (L) 35 - 45 mm Hg    pO2 Arterial 91 80 - 105 mm Hg    FIO2 60     Bicarbonate Arterial 15 (L) 21 - 28 mmol/L    Base Excess/Deficit (+/-) -10.7 (L) -9.0 - 1.8 mmol/L    Vivek's Test Yes    Glucose by meter     Status: Abnormal   Result Value Ref Range    GLUCOSE BY METER POCT 110 (H) 70 - 99 mg/dL   Lactic acid whole blood     Status: Abnormal   Result Value Ref Range    Lactic Acid 7.2 (HH) 0.7 - 2.0 mmol/L   Glucose by meter     Status: Abnormal   Result Value Ref Range    GLUCOSE BY METER POCT 110 (H) 70 - 99 mg/dL   Comprehensive metabolic panel     Status: Abnormal   Result Value Ref Range    Sodium 133 (L) 136 - 145 mmol/L    Potassium 4.7 3.4 - 5.3 mmol/L    Chloride 100 98 - 107 mmol/L    Carbon Dioxide (CO2) 12 (L) 22 - 29 mmol/L    Anion Gap 21 (H) 7 - 15 mmol/L    Urea Nitrogen 56.3 (H) 8.0 - 23.0 mg/dL    Creatinine 2.50 (H) 0.51 - 0.95 mg/dL    Calcium 8.1 (L) 8.8 - 10.2 mg/dL    Glucose 129 (H) 70 - 99 mg/dL    Alkaline Phosphatase 141 (H) 35 - 104 U/L    AST 63 (H) 10 - 35 U/L    ALT 46 (H) 10 - 35 U/L    Protein Total 5.1 (L) 6.4 - 8.3 g/dL    Albumin 3.4 (L) 3.5 - 5.2 g/dL    Bilirubin Total 4.6 (H) <=1.2 mg/dL    GFR Estimate 19 (L) >60  mL/min/1.73m2   Comprehensive metabolic panel     Status: Abnormal   Result Value Ref Range    Sodium 133 (L) 136 - 145 mmol/L    Potassium 4.7 3.4 - 5.3 mmol/L    Chloride 100 98 - 107 mmol/L    Carbon Dioxide (CO2) 11 (L) 22 - 29 mmol/L    Anion Gap 22 (H) 7 - 15 mmol/L    Urea Nitrogen 56.4 (H) 8.0 - 23.0 mg/dL    Creatinine 2.58 (H) 0.51 - 0.95 mg/dL    Calcium 8.3 (L) 8.8 - 10.2 mg/dL    Glucose 126 (H) 70 - 99 mg/dL    Alkaline Phosphatase 138 (H) 35 - 104 U/L    AST 61 (H) 10 - 35 U/L    ALT 44 (H) 10 - 35 U/L    Protein Total 4.9 (L) 6.4 - 8.3 g/dL    Albumin 3.4 (L) 3.5 - 5.2 g/dL    Bilirubin Total 4.6 (H) <=1.2 mg/dL    GFR Estimate 19 (L) >60 mL/min/1.73m2   Lactic acid whole blood     Status: Abnormal   Result Value Ref Range    Lactic Acid 7.7 (HH) 0.7 - 2.0 mmol/L   Lactic acid whole blood     Status: Abnormal   Result Value Ref Range    Lactic Acid 8.5 (HH) 0.7 - 2.0 mmol/L   Blood gas arterial     Status: Abnormal   Result Value Ref Range    pH Arterial 7.29 (L) 7.35 - 7.45    pCO2 Arterial 26 (L) 35 - 45 mm Hg    pO2 Arterial 99 80 - 105 mm Hg    FIO2 60     Bicarbonate Arterial 12 (L) 21 - 28 mmol/L    Base Excess/Deficit (+/-) -12.9 (L) -9.0 - 1.8 mmol/L    Vivek's Test Artline    Blood gas arterial     Status: Abnormal   Result Value Ref Range    pH Arterial 7.28 (L) 7.35 - 7.45    pCO2 Arterial 26 (L) 35 - 45 mm Hg    pO2 Arterial 84 80 - 105 mm Hg    FIO2 50     Bicarbonate Arterial 12 (L) 21 - 28 mmol/L    Base Excess/Deficit (+/-) -13.4 (L) -9.0 - 1.8 mmol/L    Vivek's Test Artline    CBC with platelets     Status: Abnormal   Result Value Ref Range    WBC Count 39.4 (H) 4.0 - 11.0 10e3/uL    RBC Count 2.49 (L) 3.80 - 5.20 10e6/uL    Hemoglobin 8.2 (L) 11.7 - 15.7 g/dL    Hematocrit 24.6 (L) 35.0 - 47.0 %    MCV 99 78 - 100 fL    MCH 32.9 26.5 - 33.0 pg    MCHC 33.3 31.5 - 36.5 g/dL    RDW 17.7 (H) 10.0 - 15.0 %    Platelet Count 53 (L) 150 - 450 10e3/uL   CBC with platelets     Status:  Abnormal   Result Value Ref Range    WBC Count 44.0 (H) 4.0 - 11.0 10e3/uL    RBC Count 2.59 (L) 3.80 - 5.20 10e6/uL    Hemoglobin 8.3 (L) 11.7 - 15.7 g/dL    Hematocrit 25.8 (L) 35.0 - 47.0 %     78 - 100 fL    MCH 32.0 26.5 - 33.0 pg    MCHC 32.2 31.5 - 36.5 g/dL    RDW 17.9 (H) 10.0 - 15.0 %    Platelet Count 53 (L) 150 - 450 10e3/uL   INR     Status: Abnormal   Result Value Ref Range    INR 3.46 (H) 0.85 - 1.15   Partial thromboplastin time     Status: Abnormal   Result Value Ref Range    aPTT 60 (H) 22 - 38 Seconds   Fibrinogen activity     Status: Abnormal   Result Value Ref Range    Fibrinogen Activity 93 (LL) 170 - 490 mg/dL   Ionized Calcium     Status: Abnormal   Result Value Ref Range    Calcium Ionized 4.2 (L) 4.4 - 5.2 mg/dL   Ionized Calcium     Status: Abnormal   Result Value Ref Range    Calcium Ionized 4.3 (L) 4.4 - 5.2 mg/dL   Comprehensive metabolic panel     Status: Abnormal   Result Value Ref Range    Sodium 133 (L) 136 - 145 mmol/L    Potassium 5.2 3.4 - 5.3 mmol/L    Chloride 100 98 - 107 mmol/L    Carbon Dioxide (CO2) 12 (L) 22 - 29 mmol/L    Anion Gap 21 (H) 7 - 15 mmol/L    Urea Nitrogen 54.9 (H) 8.0 - 23.0 mg/dL    Creatinine 2.82 (H) 0.51 - 0.95 mg/dL    Calcium 8.7 (L) 8.8 - 10.2 mg/dL    Glucose 133 (H) 70 - 99 mg/dL    Alkaline Phosphatase 128 (H) 35 - 104 U/L    AST 61 (H) 10 - 35 U/L    ALT 44 (H) 10 - 35 U/L    Protein Total 4.7 (L) 6.4 - 8.3 g/dL    Albumin 3.0 (L) 3.5 - 5.2 g/dL    Bilirubin Total 4.7 (H) <=1.2 mg/dL    GFR Estimate 17 (L) >60 mL/min/1.73m2   Lactic acid whole blood     Status: Abnormal   Result Value Ref Range    Lactic Acid 10.2 (HH) 0.7 - 2.0 mmol/L   Blood gas arterial     Status: Abnormal   Result Value Ref Range    pH Arterial 7.25 (L) 7.35 - 7.45    pCO2 Arterial 27 (L) 35 - 45 mm Hg    pO2 Arterial 88 80 - 105 mm Hg    FIO2 50     Bicarbonate Arterial 12 (L) 21 - 28 mmol/L    Base Excess/Deficit (+/-) -14.0 (L) -9.0 - 1.8 mmol/L    Vivek's Test  Artline    CBC with platelets     Status: Abnormal   Result Value Ref Range    WBC Count 56.7 (HH) 4.0 - 11.0 10e3/uL    RBC Count 2.61 (L) 3.80 - 5.20 10e6/uL    Hemoglobin 8.4 (L) 11.7 - 15.7 g/dL    Hematocrit 26.9 (L) 35.0 - 47.0 %     (H) 78 - 100 fL    MCH 32.2 26.5 - 33.0 pg    MCHC 31.2 (L) 31.5 - 36.5 g/dL    RDW 18.4 (H) 10.0 - 15.0 %    Platelet Count 59 (L) 150 - 450 10e3/uL   INR     Status: Abnormal   Result Value Ref Range    INR 3.42 (H) 0.85 - 1.15   Partial thromboplastin time     Status: Abnormal   Result Value Ref Range    aPTT 63 (H) 22 - 38 Seconds   Fibrinogen activity     Status: Abnormal   Result Value Ref Range    Fibrinogen Activity 94 (LL) 170 - 490 mg/dL   Ionized Calcium     Status: Normal   Result Value Ref Range    Calcium Ionized 4.4 4.4 - 5.2 mg/dL   Glucose by meter     Status: Abnormal   Result Value Ref Range    GLUCOSE BY METER POCT 117 (H) 70 - 99 mg/dL   Glucose by meter     Status: Abnormal   Result Value Ref Range    GLUCOSE BY METER POCT 111 (H) 70 - 99 mg/dL   Comprehensive metabolic panel     Status: Abnormal   Result Value Ref Range    Sodium 137 136 - 145 mmol/L    Potassium 5.3 3.4 - 5.3 mmol/L    Chloride 99 98 - 107 mmol/L    Carbon Dioxide (CO2) 14 (L) 22 - 29 mmol/L    Anion Gap 24 (H) 7 - 15 mmol/L    Urea Nitrogen 56.6 (H) 8.0 - 23.0 mg/dL    Creatinine 3.04 (H) 0.51 - 0.95 mg/dL    Calcium 8.6 (L) 8.8 - 10.2 mg/dL    Glucose 89 70 - 99 mg/dL    Alkaline Phosphatase 126 (H) 35 - 104 U/L    AST 57 (H) 10 - 35 U/L    ALT 45 (H) 10 - 35 U/L    Protein Total 4.8 (L) 6.4 - 8.3 g/dL    Albumin 2.8 (L) 3.5 - 5.2 g/dL    Bilirubin Total 5.0 (H) <=1.2 mg/dL    GFR Estimate 15 (L) >60 mL/min/1.73m2   Lactic acid whole blood     Status: Abnormal   Result Value Ref Range    Lactic Acid 11.8 (HH) 0.7 - 2.0 mmol/L   Blood gas arterial     Status: Abnormal   Result Value Ref Range    pH Arterial 7.32 (L) 7.35 - 7.45    pCO2 Arterial 28 (L) 35 - 45 mm Hg    pO2 Arterial  87 80 - 105 mm Hg    FIO2 50     Bicarbonate Arterial 14 (L) 21 - 28 mmol/L    Base Excess/Deficit (+/-) -10.6 (L) -9.0 - 1.8 mmol/L    Vivek's Test Artline    CBC with platelets     Status: Abnormal   Result Value Ref Range    WBC Count 60.8 (HH) 4.0 - 11.0 10e3/uL    RBC Count 2.72 (L) 3.80 - 5.20 10e6/uL    Hemoglobin 8.6 (L) 11.7 - 15.7 g/dL    Hematocrit 27.6 (L) 35.0 - 47.0 %     (H) 78 - 100 fL    MCH 31.6 26.5 - 33.0 pg    MCHC 31.2 (L) 31.5 - 36.5 g/dL    RDW 18.0 (H) 10.0 - 15.0 %    Platelet Count 48 (LL) 150 - 450 10e3/uL   Ionized Calcium     Status: Normal   Result Value Ref Range    Calcium Ionized 4.4 4.4 - 5.2 mg/dL   Glucose by meter     Status: Abnormal   Result Value Ref Range    GLUCOSE BY METER POCT 125 (H) 70 - 99 mg/dL   Magnesium CRRT     Status: Normal   Result Value Ref Range    Magnesium 2.0 1.7 - 2.3 mg/dL   Phosphorus     Status: Abnormal   Result Value Ref Range    Phosphorus 5.7 (H) 2.5 - 4.5 mg/dL   Glucose by meter     Status: Normal   Result Value Ref Range    GLUCOSE BY METER POCT 83 70 - 99 mg/dL   EKG 12 lead     Status: None   Result Value Ref Range    Systolic Blood Pressure  mmHg    Diastolic Blood Pressure  mmHg    Ventricular Rate 77 BPM    Atrial Rate 77 BPM    NJ Interval 192 ms    QRS Duration 100 ms     ms    QTc 434 ms    P Axis 67 degrees    R AXIS -13 degrees    T Axis 30 degrees    Interpretation ECG       Sinus rhythm  Normal ECG  When compared with ECG of 21-MAY-2023 15:40,  Nonspecific T wave abnormality no longer evident in Lateral leads  Confirmed by - EMERGENCY ROOM, PHYSICIAN ([id number]),  DEANGELO CHO ([id number]) on 2023 6:40:14 AM     Echocardiogram Complete     Status: None   Result Value Ref Range    LVEF  55-60%     [facility]    [id number]  [id number]  [id number]  [id number]^TURNER^HEM     [facility]  Echocardiography Laboratory  [address] [place] [address]     Name: LIZYPARTHA  MRN: [id number]  :  1950  Study Date: 05/23/2023 08:14 AM  Age: 73 yrs  Gender: Female  Patient Location: [facility]  Reason For Study: Shock  Ordering Physician: BRITT TURNER  Performed By: Joan Moses     BSA: 1.8 m2  Height: 61 in  Weight: 172 lb  HR: 65  BP: 86/49 mmHg  ______________________________________________________________________________  Procedure  Complete Portable Echo Adult. Optison (NDC #[id number]) given intravenously.  ______________________________________________________________________________  Interpretation Summary     The visual ejection fraction is 55-60%.  Left ventricular systolic function is normal.  There is mild to moderate (1-2+) tricuspid regurgitation.  Borderline aortic stenosis.  There is trace aortic regurgitation.  The study was technically difficult.  ______________________________________________________________________________  Left Ventricle  The left ventricle is normal in size. There is normal left ventricular wall  thickness. Diastolic Doppler findings (E/E' ratio and/or other parameters)  suggest left ventricular filling pressures are indeterminate. The visual  ejection fraction is 55-60%. Left ventricular systolic function is normal.     Right Ventricle  The right ventricle is normal in size and function.     Atria  Normal left atrial size. The right atrium is mildly dilated. There is no color  Doppler evidence of an atrial shunt.     Mitral Valve  There is mild (1+) mitral regurgitation.     Tricuspid Valve  There is mild to moderate (1-2+) tricuspid regurgitation. The right  ventricular systolic pressure is approximated at 36.7 mmHg plus the right  atrial pressure. Right ventricular systolic pressure is elevated, consistent  with mild to moderate pulmonary hypertension.     Aortic Valve  The aortic valve is not well visualized. There is trace aortic regurgitation.  The calculated aortic valve are is 2.0 cm^2. The peak AoV pressure gradient  is  20.0 mmHg. The mean AoV pressure  gradient is 9.0 mmHg. Borderline aortic  stenosis.     Pulmonic Valve  There is no pulmonic valvular regurgitation.     Vessels  The aortic root is normal size. Unable to assess mean RA pressure given the  patient is on a ventilator.     Pericardium  There is no pericardial effusion. Epicardial fat pad noted.     Rhythm  Sinus rhythm was noted.  ______________________________________________________________________________  MMode/2D Measurements & Calculations     IVSd: 1.0 cm  LVIDd: 4.4 cm  LVIDs: 2.7 cm  LVPWd: 1.0 cm  IVC diam: 3.0 cm  FS: 38.6 %  LV mass(C)d: 150.9 grams  LV mass(C)dI: 85.2 grams/m2  Ao root diam: 2.7 cm  LVOT diam: 1.7 cm  LVOT area: 2.3 cm2  LA Volume (BP): 57.6 ml  LA Volume Index (BP): 32.5 ml/m2  RV Base: 4.3 cm  RWT: 0.46     TAPSE: 2.9 cm     Doppler Measurements & Calculations  MV E max jayson: 130.0 cm/sec  MV A max jayson: 102.0 cm/sec  MV E/A: 1.3  MV max P.1 mmHg  MV mean P.0 mmHg  MV V2 VTI: 42.9 cm  MVA(VTI): 2.3 cm2  MV dec time: 0.18 sec  Ao V2 max: 222.0 cm/sec  Ao max P.0 mmHg  Ao V2 mean: 134.0 cm/sec  Ao mean P.0 mmHg  Ao V2 VTI: 49.5 cm  MARTIN(I,D): 2.0 cm2  MARTIN(V,D): 1.9 cm2  LV V1 max P.3 mmHg  LV V1 max: 189.0 cm/sec  LV V1 VTI: 43.2 cm  SV(LVOT): 98.1 ml  SI(LVOT): 55.3 ml/m2  TR max jayson: 303.0 cm/sec  TR max P.7 mmHg  AV Jayson Ratio (DI): 0.85  MARTIN Index (cm2/m2): 1.1  E/E' av.4  Lateral E/e': 12.7  Medial E/e': 14.1  RV S Jayson: 15.4 cm/sec     ______________________________________________________________________________  Report approved by: Max Hamm 2023 11:33 AM         Blood Culture Arm, Left     Status: Abnormal (Preliminary result)    Specimen: Arm, Left; Blood   Result Value Ref Range    Culture Positive on the 1st day of incubation (A)     Culture Escherichia coli (AA)    Ascites Fluid Aerobic Bacterial Culture Routine     Status: Abnormal (Preliminary result)    Specimen: Abdomen; Ascites Fluid   Result Value Ref Range     Culture Culture in progress     Culture 1+ Escherichia coli (A)    Blood Culture Hand, Left     Status: Abnormal (Preliminary result)    Specimen: Hand, Left; Blood   Result Value Ref Range    Culture Positive on the 1st day of incubation (A)     Culture Escherichia coli (AA)    CBC with platelets differential     Status: Abnormal    Narrative    The following orders were created for panel order CBC with platelets differential.  Procedure                               Abnormality         Status                     ---------                               -----------         ------                     CBC with platelets and d...[[id number]]  Abnormal            Final result               Manual Differential[[id number]]          Abnormal            Final result                 Please view results for these tests on the individual orders.   Cell count with differential fluid     Status: Abnormal    Narrative    The following orders were created for panel order Cell count with differential fluid.  Procedure                               Abnormality         Status                     ---------                               -----------         ------                     Cell Count Body Fluid[[id number]]        Abnormal            Final result               Differential Body Fluid[[id number]]                          Final result                 Please view results for these tests on the individual orders.

## 2023-05-23 NOTE — PHARMACY-VANCOMYCIN DOSING SERVICE
Vanco order changed to intermittent dosing protocol.  Patient's clearance is only 16 this morning.  Will plan to check level at noon and redose as needed

## 2023-05-23 NOTE — CARE PLAN
Notified provider about indwelling guerra catheter discussed removal or continued need.    Did provider choose to remove indwelling guerra catheter? YES    Provider's guerra indication for keeping indwelling guerra catheter: Indication for continued use: Strict 1-2 Hour I & O if external catheters are not an option    Is there an order for indwelling guerra catheter? YES    *If there is a plan to keep guerra catheter in place at discharge daily notification with provider is not necessary, but please add a notation in the treatment team sticky note that the patient will be discharging with the catheter.

## 2023-05-23 NOTE — PROGRESS NOTES
ICU End of Shift Summary.  For vital signs and complete assessments, please see documentation flowsheets.      Pertinent assessments:   Neuro: ROSITA Intubated  Cardiac: SR  Resp: FiO2 %50, RR 22, PEEP 5,   GI: No BM, OG intermittent low suction   : Cuevas. Low urine output. Tele hub MD notified  Skin: Scattered bruises  Lines: Triple lumen internal jugular, ART L Femoral,  PIV L forearm  Drips: Levo, Epi, Fentanyl, Vasso, Versed, Bicarb     Major Shift Events:   L femoral ART line placed  Started Epinephrine  Multiple critical lab values (see flow sheet)   Plan (Upcoming Events): Continue ICU cares  Discharge/Transfer Needs: TBD     Bedside Shift Report Completed : Yes  Bedside Safety Check Completed: Yes

## 2023-05-23 NOTE — PROGRESS NOTES
[facility] ICU VENTILATOR RESPIRATORY NOTE    Date of Admission: 5/22/23  Date of Intubation (most recent): 5/22/23  Reason for Mechanical Ventilation: Respiratory failure  Number of Days on Mechanical Ventilation: 2  Met Criteria for Pressure Support Trial: No  Reason for No Pressure Support Trial: Multiple pressors  Vent Mode: CMV/AC  (Continuous Mandatory Ventilation/ Assist Control)  FiO2 (%): 50 %  Resp Rate (Set): 26 breaths/min  Tidal Volume (Set, mL): 370 mL  PEEP (cm H2O): 5 cmH2O  Resp: 25    Recent Labs   Lab 05/23/23  1207 05/23/23  0559 05/23/23  0015 05/22/23  2140   PH 7.32* 7.25* 7.28* 7.29*   PCO2 28* 27* 26* 26*   PO2 87 88 84 99   HCO3 14* 12* 12* 12*   O2PER 50 50 50 60     ETT appearance on chest x-ray:  IMPRESSION: Patient has been intubated with tube tip 3.8 cm above the evelio. Right IJ central venous catheter tip RA/SVC junction. Nasogastric tube enters the stomach with tip inferior to the radiographic field of view. Minimal bibasilar pleural fluid   and atelectasis. Heart size and pulmonary vascularity within     Plan is to continue to monitor.

## 2023-05-23 NOTE — PROCEDURES
[facility]    Central line    Date/Time: 5/23/2023 2:27 PM    Performed by: Meera Orozco MD  Authorized by: Meera Orozco MD  Indications: renal replacement rx access.      UNIVERSAL PROTOCOL   Site Marked: NA  Prior Images Obtained and Reviewed:  NA  Required items: Required blood products, implants, devices and special equipment available    Patient identity confirmed:  Arm band, provided demographic data and hospital-assigned identification number  Patient was reevaluated immediately before administering moderate or deep sedation or anesthesia  Confirmation Checklist:  Patient's identity using two indicators, relevant allergies, procedure was appropriate and matched the consent or emergent situation and correct equipment/implants were available  Universal Protocol: the Joint Commission Universal Protocol was followed      SEDATION  Patient Sedated: Yes    Vital signs: Vital signs monitored during sedation      Preparation: skin prepped with 2% chlorhexidine  Skin prep agent dried: skin prep agent completely dried prior to procedure  Sterile barriers: all five maximum sterile barriers used - cap, mask, sterile gown, sterile gloves, and large sterile sheet  Hand hygiene: hand hygiene performed prior to central venous catheter insertion  Catheter size: 12 Fr  Ultrasound guidance: yes  Number of attempts: 1  Post-procedure: line sutured and dressing applied  Assessment: blood return through all ports and free fluid flow      PROCEDURE  Describe Procedure: Line was placed finally in the left femoral vein without periprocedural complications. My first attempt was left internal jugular (triple lumen already in right internal jugular) under US guidance. Needle entered vein on first attempt, however wire would not thread past 15 cm and met resistance. I tried a different wire and there was resistance at the same point. I then tried right femoral vein with good blood flow from needle  insertion on first attempt but wire was unable to be threaded past 10 cm.     Needle was then placed under ultrasound guidance in  Left femoral vein with good flow and easy to thread wire. First pass at this location.    CXR was ordered because of left internal jugular attempt.  Patient Tolerance:  Patient tolerated the procedure well with no immediate complications  Length of time physician/provider present for 1:1 monitoring during sedation: 60

## 2023-05-24 NOTE — PROVIDER NOTIFICATION
DATE/TIME OF CALL RECEIVED FROM LAB:  05/23/23 at 7:06 PM   LAB TEST:  Lactic  LAB VALUE:  13.2  PROVIDER NOTIFIED?: Yes  PROVIDER NAME: Pam  DATE/TIME LAB VALUE REPORTED TO PROVIDER: 05/23/23 1829  MECHANISM OF PROVIDER NOTIFICATION: Phone Call  PROVIDER RESPONSE: No new orders at this time

## 2023-05-24 NOTE — CONSULTS
Care Management Follow Up    Length of Stay (days): 2    Concerns to be Addressed: discharge planning  Patient plan of care discussed at interdisciplinary rounds: Yes      Additional Information:  Pt admitted with Hypoglycemia, noted to have unplanned readmission risk of 33%.  Care Coordination team is following and will assess for discharge needs when medically appropriate.  Pt is currently intubated.      CAROLINA Merritt, [facility]  Inpatient Care Coordination  [facility]  [phone]

## 2023-05-24 NOTE — PROVIDER NOTIFICATION
05/24/23 1200   Critical Test Results/Notification   Critical Lab Result (Lab Name and Value) Lactic Acid 14.5   What Time Did The Lab Notify You? 1248   Provider Notified yes   Date of Provider Notification 05/24/23   Time of Provider Notification 1248   Mechanism of Provider notification verbal (face-to-face)   What Provider Did You Notify? Pam

## 2023-05-24 NOTE — PROGRESS NOTES
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 Coli bacteremia, severe lactic acidosis, SBP, multi-organ failure. She has been intubated. She is requiring CRRT for DANDRE.     At the present, patient is on cortef, zosyn, epi/vaso/levo drips.     She appears to have received 1-2U PRBC this admission, two 5-packs of cryo, and a single unit of cryo as well.         Labs:   Latest Reference Range & Units 05/24/23 12:09   Sodium 136 - 145 mmol/L 137   Potassium 3.4 - 5.3 mmol/L 3.9   Chloride 98 - 107 mmol/L 94 (L)   Carbon Dioxide (CO2) 22 - 29 mmol/L 17 (L)   Urea Nitrogen 8.0 - 23.0 mg/dL 28.9 (H)   Creatinine 0.51 - 0.95 mg/dL 1.74 (H)   GFR Estimate >60 mL/min/1.73m2 30 (L)   Calcium 8.8 - 10.2 mg/dL 9.0   Anion Gap 7 - 15 mmol/L 26 (H)   Magnesium 1.7 - 2.3 mg/dL 1.9   Phosphorus 2.5 - 4.5 mg/dL 3.8   Albumin 3.5 - 5.2 g/dL 3.3 (L)   Calcium Ionized Whole Blood 4.4 - 5.2 mg/dL 4.5   Glucose 70 - 99 mg/dL 117 (H)   Lactic Acid 0.7 - 2.0 mmol/L 14.5 (HH)   WBC 4.0 - 11.0 10e3/uL 28.2 (H)   Hemoglobin 11.7 - 15.7 g/dL 7.5 (L)   Hematocrit 35.0 - 47.0 % 23.0 (L)   Platelet Count 150 - 450 10e3/uL 17 (LL)   RBC Count 3.80 - 5.20 10e6/uL 2.34 (L)   MCV 78 - 100 fL 98   MCH 26.5 - 33.0 pg 32.1   MCHC 31.5 - 36.5 g/dL 32.6   RDW 10.0 - 15.0 % 18.3 (H)        Latest Reference Range & Units 05/22/23 22:16 05/23/23 05:56 05/23/23 18:06 05/24/23 03:55   INR 0.85 - 1.15  3.46 (H) 3.42 (H) 2.95 (H) 4.66 (H)   PTT 22 - 38 Seconds 60 (H) 63 (H) 59 (H) 75 (H)   Fibrinogen 170 - 490 mg/dL 93 (LL) 94 (LL) 138 (L) 98 (LL)           Final Diagnosis   Peripheral blood, morphology:  - Marked leukocytosis, with atypical lymphocytosis and reactive-appearing neutrophilia.  - Marked thrombocytopenia.  - Moderate  anemia, normocytic and normochromic, with nonspecific anisopoikilocytosis and NRBCs.  - Negative for schistocytes.  - Negative for overt features of myelodysplasia or circulating blasts.  - History of chronic lymphocytic leukemia (CLL) (per clinical note).         -Peripheral smear is negative for schistocytes, making MAHA/TTP/HUS less likely.  -I have checked repeat CBC, hepatic panel, LDH, haptoglobin, ARNEL to assess for hemolysis.  -I have placed coag studies, including fibrinogen every 8 hours.     -Can administer FFP/VitK for elevated INR/coagulopathy.  -Transfuse 5U cryoprecipitate for fibrinogen <100 (she is on CRRT, which may be contributing to the refractoriness of cryo transfusion, in addition to ongoing septic shock).  -Transfuse PRBC for Hb </=7.  -Transfuse PLT for PLT </= 20K.    Formal evaluation to follow.     Natalie Castro DO  Hematology/Oncology  [facility]

## 2023-05-24 NOTE — PLAN OF CARE
ICU End of Shift Summary.  For vital signs and complete assessments, please see documentation flowsheets.     Pertinent assessments:   Neuro: RASS goal of -1 to -2. Pupils equal and reactive. On versed and fentanyl for sedation.  Resp: Remains on vent 50%FiO2, rate of 26, peep of 5, volume of 370. Lung sounds diminished.   Cardiac: SR. Edematous.   GI/: OG tube remains in-off LIS. Cuevas in with little to no urine output. CRRT started.  Skin: Scattered brusies throughout.   Lines: R internal jugular, L PIV, L femoral art line, L femoral hemodialysis catheter.   Drips: Versed and fentanyl for sedation. Norepi, epi and vasopressin for hemodynamics.   Major Shift Events: Lactic acid and WBC continue to rise. L femoral hemodialysis catheter placed and started on CRRT.    Plan (Upcoming Events): Continue CRRT. Manage BP with vaso, norepi and epi. Continue ICU cares.   Discharge/Transfer Needs: TBD    Bedside Shift Report Completed : Y  Bedside Safety Check Completed: BENITO

## 2023-05-24 NOTE — CONSULTS
Palliative Care Consultation Note  [facility]      Patient: Teresa De Santiago  Date of Admission:  5/22/2023    Requesting Clinician / Team: Dr Prieto  Reason for consult: Symptom management  Goals of care  Decisional support  Patient and family support     Recommendations & Counseling     GOALS OF CARE:     Life-prolonging without limits     ADVANCE CARE PLANNING:    No health care directive on file. Per  informed consent policy next of kin should be involved if patient becomes unable.    There is no POLST form on file, plan to complete prior to DC.    Code status: Full Code    MEDICAL MANAGEMENT:    #Pain,acute     Opioids: Fentanyl drip for sedation that will also cover any abdominal pain she might be having    Acetaminophen (Tylenol), PRN     #Dyspnea    Full vent support per medical/ICU team     #General Weakness/Debility     Appreciate the staff for total cares    Appreciate the input of therapies for discharge recommendations when medically indicated    PSYCHOSOCIAL/SPIRITUAL:    Family-Spouse Yomi, 3 adult Children that live in the area    Patient did not work outside of the home, spouse is an   [place] community: [facility]    Palliative Care will continue to follow. Thank you for the consult and allowing us to aid in the care of Teresa De Santiago.    These recommendations have been discussed with medical team and family.    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 She has anasarca, esophageal varices, chronic anemia, chronic thrombocytopenia related to her cirrhosis, she has a history of a right nephrectomy for nonmalignant mass, and recent diagnosis of CLL,  who presented on 5/22 with positive blood cultures for Ecoli after being seen in the ED.    Today, the patient was seen for:  Goals of care   Support    Palliative Care Summary:   Met with patient's spouse Yomi.   I introduced our role as an extra layer of support and how we help patients and families dealing with serious, potentially life-limiting illnesses. I explained the composition of the palliative care team.  Palliative care helps patients and families navigate their care while focusing on the whole person; providing emotional, social and spiritual support  Palliative care often assists with symptom management, information sharing about what to expect from the illness, available treatment options and what effect those options may have on the disease course, and provide effective communication and caring support. We discussed her previous function prior to hospitalization. Per spouse she was active and very healthy despite her liver failure. He reports that she was too well for a transplant but are still on the list and recently were pursuing the workup. He reports that the family's goals are to be hopeful and for her to get back home as able.     Prognosis, Goals, & Planning:      Functional Status just prior to this current hospitalization:    Independent at home      Prognosis, Goals, and/or Advance Care Planning:  We discussed general treatment options (full/restorative, selective/conservatives, and comfort only/hospice). We then discussed how these specifically apply to critical illness. Based on this discussion, family wants to give her a fighting chance to get better.    Advance Care Planning Discussion 5/24/2023. IAgata NP met with Spouse Brianda today at the hospital to discuss Advance Care Planning. Teresa De Santiago does not have decisional capacity  and was not present for this discussion due to intubated and sedated.  Those present were informed of the voluntary nature of this  discussion and wished to proceed.  The discussion included: goals of care, wishes and worries, function prior to hospitalizations, family understanding. . This discussion began at 1025 and ended at 1115 for a total of 51 minutes.        Code Status was addressed today:     Yes, We discussed potential risks and rationale of attempting cardiac resuscitation. We also discussed probability of survival as well as quality of life implications.  Based on this discussion, patient or surrogate response/decision: Yomi would like to continue with CPR and will talk about this with the other family members            Patient has decision-making capacity today for complex decisions:Unreliable            Coping, Meaning, & Spirituality:     Mood, coping, and/or meaning in the context of serious illness were addressed today: Yes    Social:   Living situation:lives with significant other/spouse  Important relationships/caregivers:family    Medications:  I have reviewed this patient's medication profile and medications from this hospitalization. Notable medications:     ROS:  Comprehensive ROS is reviewed and is negative except as here & per HPI:     Physical Exam   Vital Signs with Ranges  Temp:  [94.5  F (34.7  C)-98.8  F (37.1  C)] 95.4  F (35.2  C)  Pulse:  [55-74] 63  Resp:  [13-26] 25  MAP:  [65 mmHg-82 mmHg] 71 mmHg  Arterial Line BP: (115-146)/(40-53) 123/46  FiO2 (%):  [50 %] 50 %  SpO2:  [92 %-100 %] 92 %  188 lbs 4.37 oz    PHYSICAL EXAM:  Constitutional: intubated and sedated, critically ill appearing   Cardiovascular: negative  Respiratory: Full vent support  Psychiatric: sedated  Head: negative  ENT: ET tube in place  PAIN: No s/s of pain    Data reviewed:  Results for orders placed or performed during the hospital encounter of 05/22/23 (from the past 24 hour(s))   Blood gas arterial   Result Value Ref Range    pH Arterial 7.32 (L) 7.35 - 7.45    pCO2 Arterial 28 (L) 35 - 45 mm Hg    pO2 Arterial 87 80 - 105 mm Hg     FIO2 50     Bicarbonate Arterial 14 (L) 21 - 28 mmol/L    Base Excess/Deficit (+/-) -10.6 (L) -9.0 - 1.8 mmol/L    Vivek's Test Artline    Prepare cryoprecipitate (5-pack units)   Result Value Ref Range    Blood Component Type Cryoprecipitate     Product Code M4250K47     Unit Status Transfused     Unit Number [id number]     CODING SYSTEM HAXU270     ISSUE DATE AND TIME [id number]     UNIT ABO/RH A+     UNIT TYPE ISBT 6200    Prepare cryoprecipitate (5-pack units)   Result Value Ref Range    Blood Component Type Cryoprecipitate     Product Code H4126B52     Unit Status Transfused     Unit Number [id number]     CODING SYSTEM SZZW115     ISSUE DATE AND TIME [id number]     UNIT ABO/RH AB+     UNIT TYPE ISBT 8400    Central line    Narrative    Meera Orozco MD     5/23/2023  2:33 PM  [facility] [place]    Central line    Date/Time: 5/23/2023 2:27 PM    Performed by: Meera Orozco MD  Authorized by: Meera Orozco MD  Indications: renal replacement rx   access.      UNIVERSAL PROTOCOL   Site Marked: NA  Prior Images Obtained and Reviewed:  NA  Required items: Required blood products, implants, devices and special   equipment available    Patient identity confirmed:  Arm band, provided demographic data and   hospital-assigned identification number  Patient was reevaluated immediately before administering moderate or deep   sedation or anesthesia  Confirmation Checklist:  Patient's identity using two indicators, relevant   allergies, procedure was appropriate and matched the consent or emergent   situation and correct equipment/implants were available  Universal Protocol: the Joint Commission Universal Protocol was followed      SEDATION  Patient Sedated: Yes    Vital signs: Vital signs monitored during sedation      Preparation: skin prepped with 2% chlorhexidine  Skin prep agent dried: skin prep agent completely dried prior to procedure  Sterile barriers: all five maximum  sterile barriers used - cap, mask,   sterile gown, sterile gloves, and large sterile sheet  Hand hygiene: hand hygiene performed prior to central venous catheter   insertion  Catheter size: 12 Fr  Ultrasound guidance: yes  Number of attempts: 1  Post-procedure: line sutured and dressing applied  Assessment: blood return through all ports and free fluid flow      PROCEDURE  Describe Procedure: Line was placed finally in the left femoral vein   without periprocedural complications. My first attempt was left internal   jugular (triple lumen already in right internal jugular) under US   guidance. Needle entered vein on first attempt, however wire would not   thread past 15 cm and met resistance. I tried a different wire and there   was resistance at the same point. I then tried right femoral vein with   good blood flow from needle insertion on first attempt but wire was unable   to be threaded past 10 cm.     Needle was then placed under ultrasound guidance in  Left femoral vein   with good flow and easy to thread wire. First pass at this location.    CXR was ordered because of left internal jugular attempt.  Patient Tolerance:  Patient tolerated the procedure well with no immediate   complications  Length of time physician/provider present for 1:1 monitoring during   sedation: 60   XR Chest Port 1 View    Narrative    CHEST ONE VIEW PORTABLE May 23, 2023 2:42 PM     HISTORY: Failed line placement.    COMPARISON: 5/22/2023.      Impression    IMPRESSION: Endotracheal tube, enteric tube, and right IJ central  venous catheter are unchanged. A small left pleural effusion with  associated opacity at the left lung base has increased since the  previous exam. Mild patchy opacity at the right lung base is  unchanged. No pneumothorax.    ELLEN GOLDBERG MD         SYSTEM ID:  [id number]   Lab Blood Morphology Pathologist Review    Narrative    The following orders were created for panel order Lab Blood Morphology Pathologist  Review.  Procedure                               Abnormality         Status                     ---------                               -----------         ------                     Bld morphology pathology...[[id number]]                      In process                 CBC with platelets and d...[[id number]]  Abnormal            Final result               Manual Differential[[id number]]          Abnormal            Final result               Reticulocyte count[[id number]]           Abnormal            Final result               Morphology Tracking[[id number]]                              Final result                 Please view results for these tests on the individual orders.   ABO/Rh type and screen    Narrative    The following orders were created for panel order ABO/Rh type and screen.  Procedure                               Abnormality         Status                     ---------                               -----------         ------                     Adult Type and Screen[[id number]]                            Edited Result - FINAL        Please view results for these tests on the individual orders.   CBC with platelets and differential   Result Value Ref Range    WBC Count 58.1 (HH) 4.0 - 11.0 10e3/uL    RBC Count 2.56 (L) 3.80 - 5.20 10e6/uL    Hemoglobin 8.3 (L) 11.7 - 15.7 g/dL    Hematocrit 25.6 (L) 35.0 - 47.0 %     78 - 100 fL    MCH 32.4 26.5 - 33.0 pg    MCHC 32.4 31.5 - 36.5 g/dL    RDW 18.1 (H) 10.0 - 15.0 %    Platelet Count 49 (LL) 150 - 450 10e3/uL   Reticulocyte count   Result Value Ref Range    % Reticulocyte 2.6 (H) 0.5 - 2.0 %    Absolute Reticulocyte 0.068 0.025 - 0.095 10e6/uL   Adult Type and Screen   Result Value Ref Range    ABO/RH(D) A POS     Antibody Screen Negative Negative    SPECIMEN EXPIRATION DATE [id number]    Manual Differential   Result Value Ref Range    % Neutrophils 36 %    % Lymphocytes 62 %    % Monocytes 2 %    % Eosinophils 0 %    % Basophils 0 %    Absolute  Neutrophils 20.9 (H) 1.6 - 8.3 10e3/uL    Absolute Lymphocytes 36.0 (H) 0.8 - 5.3 10e3/uL    Absolute Monocytes 1.2 0.0 - 1.3 10e3/uL    Absolute Eosinophils 0.0 0.0 - 0.7 10e3/uL    Absolute Basophils 0.0 0.0 - 0.2 10e3/uL    RBC Morphology Confirmed RBC Indices     Platelet Assessment  Automated Count Confirmed. Platelet morphology is normal.     Automated Count Confirmed. Platelet morphology is normal.   Glucose by meter   Result Value Ref Range    GLUCOSE BY METER POCT 46 (LL) 70 - 99 mg/dL   Glucose by meter   Result Value Ref Range    GLUCOSE BY METER POCT 132 (H) 70 - 99 mg/dL   Lactic acid whole blood   Result Value Ref Range    Lactic Acid 13.2 (HH) 0.7 - 2.0 mmol/L   INR   Result Value Ref Range    INR 2.95 (H) 0.85 - 1.15   Partial thromboplastin time   Result Value Ref Range    aPTT 59 (H) 22 - 38 Seconds   Fibrinogen activity   Result Value Ref Range    Fibrinogen Activity 138 (L) 170 - 490 mg/dL   Blood gas arterial   Result Value Ref Range    pH Arterial 7.36 7.35 - 7.45    pCO2 Arterial 28 (L) 35 - 45 mm Hg    pO2 Arterial 93 80 - 105 mm Hg    FIO2 50     Bicarbonate Arterial 16 (L) 21 - 28 mmol/L    Base Excess/Deficit (+/-) -8.6 -9.0 - 1.8 mmol/L    Vivek's Test Artline    Glucose by meter   Result Value Ref Range    GLUCOSE BY METER POCT 79 70 - 99 mg/dL   Renal panel CRRT   Result Value Ref Range    Sodium 137 136 - 145 mmol/L    Potassium 4.3 3.4 - 5.3 mmol/L    Chloride 95 (L) 98 - 107 mmol/L    Carbon Dioxide (CO2) 15 (L) 22 - 29 mmol/L    Anion Gap 27 (H) 7 - 15 mmol/L    Glucose 93 70 - 99 mg/dL    Urea Nitrogen 46.9 (H) 8.0 - 23.0 mg/dL    Creatinine 2.42 (H) 0.51 - 0.95 mg/dL    GFR Estimate 20 (L) >60 mL/min/1.73m2    Calcium 8.8 8.8 - 10.2 mg/dL    Albumin 3.9 3.5 - 5.2 g/dL    Phosphorus 4.6 (H) 2.5 - 4.5 mg/dL   Calcium Ionized CRRT   Result Value Ref Range    Calcium Ionized 4.2 (L) 4.4 - 5.2 mg/dL    Lactic Acid 14.1 (HH) 0.7 - 2.0 mmol/L   Magnesium CRRT   Result Value Ref Range     Magnesium 1.8 1.7 - 2.3 mg/dL   CBC with platelets CRRT   Result Value Ref Range    WBC Count 45.1 (H) 4.0 - 11.0 10e3/uL    RBC Count 2.21 (L) 3.80 - 5.20 10e6/uL    Hemoglobin 7.2 (L) 11.7 - 15.7 g/dL    Hematocrit 22.0 (L) 35.0 - 47.0 %     78 - 100 fL    MCH 32.6 26.5 - 33.0 pg    MCHC 32.7 31.5 - 36.5 g/dL    RDW 17.9 (H) 10.0 - 15.0 %    Platelet Count 36 (LL) 150 - 450 10e3/uL   Glucose by meter   Result Value Ref Range    GLUCOSE BY METER POCT 82 70 - 99 mg/dL   MRSA MSSA PCR, Nasal Swab    Specimen: Nares, Bilateral; Swab   Result Value Ref Range    MRSA Target DNA Negative Negative    SA Target DNA Negative     Narrative    The [employer]  Xpert SA Nasal Complete assay performed in the [employer]  Dx System is a qualitative in vitro diagnostic test designed for rapid detection of Staphylococcus aureus (SA) and methicillin-resistant Staphylococcus aureus (MRSA) from nasal swabs in patients at risk for nasal colonization. The test utilizes automated real-time polymerase chain reaction (PCR) to detect MRSA/SA DNA. The Xpert SA Nasal Complete assay is intended to aid in the prevention and control of MRSA/SA infections in healthcare settings. The assay is not intended to diagnose, guide or monitor treatment for MRSA/SA infections, or provide results of susceptibility to methicillin. A negative result does not preclude MRSA/SA nasal colonization.    Lactic acid whole blood   Result Value Ref Range    Lactic Acid 15.0 (HH) 0.7 - 2.0 mmol/L   Blood gas arterial   Result Value Ref Range    pH Arterial 7.36 7.35 - 7.45    pCO2 Arterial 30 (L) 35 - 45 mm Hg    pO2 Arterial 82 80 - 105 mm Hg    FIO2 50     Bicarbonate Arterial 17 (L) 21 - 28 mmol/L    Base Excess/Deficit (+/-) -8.1 -9.0 - 1.8 mmol/L    Vivek's Test Artline    Glucose by meter   Result Value Ref Range    GLUCOSE BY METER POCT 94 70 - 99 mg/dL   Renal panel CRRT   Result Value Ref Range    Sodium 136 136 - 145 mmol/L    Potassium 3.9 3.4 - 5.3 mmol/L     Chloride 94 (L) 98 - 107 mmol/L    Carbon Dioxide (CO2) 16 (L) 22 - 29 mmol/L    Anion Gap 26 (H) 7 - 15 mmol/L    Glucose 106 (H) 70 - 99 mg/dL    Urea Nitrogen 37.6 (H) 8.0 - 23.0 mg/dL    Creatinine 2.03 (H) 0.51 - 0.95 mg/dL    GFR Estimate 25 (L) >60 mL/min/1.73m2    Calcium 9.4 8.8 - 10.2 mg/dL    Albumin 4.0 3.5 - 5.2 g/dL    Phosphorus 4.3 2.5 - 4.5 mg/dL   Magnesium CRRT   Result Value Ref Range    Magnesium 2.2 1.7 - 2.3 mg/dL   CBC with platelets CRRT   Result Value Ref Range    WBC Count 30.3 (H) 4.0 - 11.0 10e3/uL    RBC Count 2.01 (L) 3.80 - 5.20 10e6/uL    Hemoglobin 6.4 (LL) 11.7 - 15.7 g/dL    Hematocrit 20.0 (L) 35.0 - 47.0 %     78 - 100 fL    MCH 31.8 26.5 - 33.0 pg    MCHC 32.0 31.5 - 36.5 g/dL    RDW 18.0 (H) 10.0 - 15.0 %    Platelet Count 21 (LL) 150 - 450 10e3/uL   Calcium Ionized CRRT   Result Value Ref Range    Calcium Ionized 4.4 4.4 - 5.2 mg/dL    Lactic Acid 14.8 (HH) 0.7 - 2.0 mmol/L   ALT   Result Value Ref Range    ALT 30 10 - 35 U/L   AST   Result Value Ref Range    AST 42 (H) 10 - 35 U/L   Alkaline phosphatase   Result Value Ref Range    Alkaline Phosphatase 125 (H) 35 - 104 U/L   Bilirubin direct   Result Value Ref Range    Bilirubin Direct 2.94 (H) 0.00 - 0.30 mg/dL   Bilirubin  total   Result Value Ref Range    Bilirubin Total 5.3 (H) <=1.2 mg/dL   Protein total   Result Value Ref Range    Protein Total 5.2 (L) 6.4 - 8.3 g/dL   INR   Result Value Ref Range    INR 4.66 (H) 0.85 - 1.15   Partial thromboplastin time   Result Value Ref Range    aPTT 75 (H) 22 - 38 Seconds   Fibrinogen activity   Result Value Ref Range    Fibrinogen Activity 98 (LL) 170 - 490 mg/dL   Glucose by meter   Result Value Ref Range    GLUCOSE BY METER POCT 97 70 - 99 mg/dL   Prepare red blood cells (unit)   Result Value Ref Range    Blood Component Type Red Blood Cells     Product Code F9840M45     Unit Status Transfused     Unit Number [id number]     CROSSMATCH Compatible     CODING SYSTEM  WZHX965     ISSUE DATE AND TIME [id number]     UNIT ABO/RH A+     UNIT TYPE ISBT 6200    Lactic acid whole blood   Result Value Ref Range    Lactic Acid 16.0 (HH) 0.7 - 2.0 mmol/L   Blood gas arterial   Result Value Ref Range    pH Arterial 7.33 (L) 7.35 - 7.45    pCO2 Arterial 31 (L) 35 - 45 mm Hg    pO2 Arterial 70 (L) 80 - 105 mm Hg    FIO2 50     Bicarbonate Arterial 16 (L) 21 - 28 mmol/L    Base Excess/Deficit (+/-) -9.1 (L) -9.0 - 1.8 mmol/L    Vivek's Test Artline    Glucose by meter   Result Value Ref Range    GLUCOSE BY METER POCT 98 70 - 99 mg/dL   Hemoglobin   Result Value Ref Range    Hemoglobin 7.6 (L) 11.7 - 15.7 g/dL          Medical Decision Making     MANAGEMENT DISCUSSED with the following over the past 24 hours: medical team   Tests REVIEWED in the past 24 hours:  - See lab/imaging results included in the data section of the note  SUPPLEMENTAL HISTORY, in addition to the patient's history, over the past 24 hours obtained from:   - Spouse or significant other  Medical complexity over the past 24 hours:  - goals of care conversation and emotional support

## 2023-05-24 NOTE — PROGRESS NOTES
[facility] ICU VENTILATOR RESPIRATORY NOTE  Date of Admission: 5/22/23  Date of Intubation (most recent): 5/22/23  Reason for Mechanical Ventilation: Respiratory failure  Number of Days on Mechanical Ventilation: 3  Met Criteria for Pressure Support Trial: No  Reason for No Pressure Support Trial: On pressors and CRRT  ABG Results:   Recent Labs   Lab 05/23/23  2356 05/23/23  1814 05/23/23  1207 05/23/23  0559   PH 7.36 7.36 7.32* 7.25*   PCO2 30* 28* 28* 27*   PO2 82 93 87 88   HCO3 17* 16* 14* 12*   O2PER 50 50 50 50     ETT appearance on chest x-ray: 3.8cm above evelio    Vent Mode: CMV/AC  (Continuous Mandatory Ventilation/ Assist Control)  FiO2 (%): 50 %  Resp Rate (Set): 26 breaths/min  Tidal Volume (Set, mL): 370 mL  PEEP (cm H2O): 5 cmH2O  Resp: 25      Plan:  Continue to monitor and assess.       Hood Osorio, RT

## 2023-05-24 NOTE — PROVIDER NOTIFICATION
DATE/TIME OF CALL RECEIVED FROM LAB:  05/23/23 at 8:27 PM   LAB TEST:  lacti acid & ionized calcium  LAB VALUE:  Lactic acid 14.1 & Ionized Calcium 4.2  PROVIDER NOTIFIED?: Yes  PROVIDER NAME: Notified BS JOHN Waterman & Tele Hub RN to notify Dr. Driscoll  DATE/TIME LAB VALUE REPORTED TO PROVIDER: 5/23 @ 2027  MECHANISM OF PROVIDER NOTIFICATION: Phone Call  PROVIDER RESPONSE: Awaiting orders.

## 2023-05-24 NOTE — PLAN OF CARE
ICU End of Shift Summary.  For vital signs and complete assessments, please see documentation flowsheets.      Pertinent assessments: Pt only minimally responsive to pain/suctioning on Versed and Fentanyl. Did not wean sedation as patient is unstable and did not want to risk manipulation of HD line as it a femoral placement. CPOT 0. Temp low via Cuevas cathter all night but temporal in the 96 range. LSs clear in upper airways but diminished in the bases. Satting mid to high 90s on 50% FiO2. Only scant secretions in ET tube with no blood noted this shift. Tele SB/SR. Minimal output from Cuevas catheter on CRRT with only 17 mL out this shift. Weight up over 7 kg from yesterday. Edema has increased all over body with +3-+4 in most places. Skin weepy. Bath given except back as patient is unstable. Micro turns done throughout shift.  Major Shift Events: 0553 - Notified Tele-Hub patient's elevated INR. No signs of bleeding throughout night except for small amount of blood with subglottal suctioning x 1. Dr. Teran said to finish the blood transfusion. No Cryo or Vit K at this time. Also asked if another unit of blood should be ordered since blood from CRRT was unable to be returned. Plan to just give the 1 unit for now and recheck hemoglobin 2 hours after infusion complete to reassess.              - Multiple critical lab values reported throughout the night (see flowsheets).              - Able to wean Levophed from 0.45 mcg/kg/min to 0.3 mcg/kg/min. Started to wean Epi but had to go back up after CRRT blood was not able to be returned and filter changed. Starting to wean Epi again prior to end of shift.              - Filter changed at 0610 after return line access pressure increased quickly and TMP elevated. Blood unable to be returned. Tele-Hub aware.              - 0650 - Yomi called and was updated/questions answered.  Plan (Upcoming Events): Continue CRRT per Nephrology guidance. Transfuse 1 unit of blood and  recheck Hemoglobin 2 hours after infused. Continue antibiotics. Palliative to see patient/family today.  Discharge/Transfer Needs: TBD. Continue ICU cares at this time.     Bedside Shift Report Completed   Bedside Safety Check Completed    Goal Outcome Evaluation:      Plan of Care Reviewed With: patient, spouse, family    Overall Patient Progress: decliningOverall Patient Progress: declining

## 2023-05-24 NOTE — CARE PLAN
Notified provider about indwelling guerra catheter discussed removal or continued need.    Did provider choose to remove indwelling guerra catheter? NO    Provider's guerra indication for keeping indwelling guerra catheter: Indication for continued use: Strict 1-2 Hour I & O if external catheters are not an option    Is there an order for indwelling guerra catheter? YES    *If there is a plan to keep guerra catheter in place at discharge daily notification with provider is not necessary, but please add a notation in the treatment team sticky note that the patient will be discharging with the catheter.

## 2023-05-24 NOTE — PLAN OF CARE
Goal Outcome Evaluation:      Plan of Care Reviewed With: spouse, patient, family    Overall Patient Progress: decliningOverall Patient Progress: declining         ICU End of Shift Summary.  For vital signs and complete assessments, please see documentation flowsheets.     Pertinent assessments:   Neuro: Unresponsive with versed off earlier in shift. Pupils reactive. Fentanyl running at 25 mcg.   Resp: LS clear and diminished bilaterally. ETT at 22 cm, remains on vent at 50% FiO2.   CV: SR/SB. Titrating vasopressors for MAP above 60 per MD. Able to discontinue Epi drip. Remains edematous +3 to +4.   GI/: Bowel sound hypoactive. Last bowel movement 5/21/23 per family. Cuevas in place with minimal tea colored output.   Skin: Extensive bruising on neck, bilateral forearms, groin and abdomen. Skin moist. New hematomas on arms post blood draws-MD aware at beside, outlined and will continue to monitor.   Major Shift Events: Remains on CRRT. Multiple critical lab values reported. Lactic acid decreasing now 14.5. Received 2 5-packs of cryo. Hemoc consulted. Family discussed goals of care with palliative team. Repeated blood cultures ordered, only one set obtained with assistance from vascular access.  Plan (Upcoming Events): Continue CRRT, monitoring labs, and ICU cares.  Discharge/Transfer Needs: TBD    Bedside Shift Report Completed : Y  Bedside Safety Check Completed: Y

## 2023-05-24 NOTE — PROGRESS NOTES
[facility] ICU VENTILATOR RESPIRATORY NOTE    Date of Admission: 5/22/23  Date of Intubation (most recent): 5/22/23  Reason for Mechanical Ventilation: resp failure  Number of Days on Mechanical Ventilation: 3    Met Criteria for Pressure Support Trial: No  Reason for No Pressure Support Trial: On pressors and CRRT    Vital Signs:  Temp: 96.8  F (36  C) Temp src: Temporal  Pulse: 62   Resp: 25 SpO2: 96 % O2 Device: Mechanical Ventilator       ABG Results:   Recent Labs   Lab 05/24/23  1214 05/24/23  0614 05/23/23 2356 05/23/23 1814   PH 7.38 7.33* 7.36 7.36   PCO2 32* 31* 30* 28*   PO2 70* 70* 82 93   HCO3 19* 16* 17* 16*   O2PER 50 50 50 50     VBG Results: Venous Blood Gas  Recent Labs   Lab 05/24/23  1214 05/24/23  0614 05/23/23 2356 05/23/23 1814 05/22/23 1954 05/22/23  0841   HCO3V  --   --   --   --   --  17*   O2PER 50 50 50 50   < >  --     < > = values in this interval not displayed.       Vent Mode: CMV/AC  (Continuous Mandatory Ventilation/ Assist Control)  FiO2 (%): 50 %  Resp Rate (Set): 26 breaths/min  Tidal Volume (Set, mL): 370 mL  PEEP (cm H2O): 5 cmH2O  Resp: 25      Plan:  Continue to monitor    Meera Valenzuela, RT

## 2023-05-24 NOTE — PROGRESS NOTES
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       1)Anuric DANDRE   ATN   In setting of hypotension, sepsis, E coli bacteremia, peritonitis, and cirrhosis. Ischemic ATN. Started CRRT 5/23 though had lengthy discussion with patient's , given continued high lactate and high pressor needs, overall prognosis remains poor. Will provide time-limited trial of dialysis, but would not recommend chronic dialysis if indicated.   CRRT started 5/23                  - L femoral mary                 - pre-filter prismasol 2K 1000 ml/h                  - dialysate prismasol 2K 1000 ml/h                  - post-filter sodium bicarbonate (150 mEq/L) 200 ml/h                  - no UF. Very edematous and volume up, but unable to achieve any UF due to hemodynamics. Thankfully oxygenating ok, will continue no UF for today and readdress tomorrow.     2) Septic shock due to E coli bacteremia   SBP   On 3 pressors. Cultures remain positive.     3) Severe lactic acidosis   AGMA, improved   Lactate minimal improved, remains >10 despite CRRT.   - continue CRRT with post-filter bicarb     4) CKD II   Solitary kidney   History of R nephrectomy   Baseline Cr 1-1.2 in setting of solitary kidney.        5) Acute liver failure  Cirrhosis due to PBC, decompensated   Coagulopathy   Slowly worsening cirrhosis since diagnosis with CLL. Dr. Daniel, primary hepatologist has been concerned that CLL may be affecting her liver since liver decompensation correlating with CLL diagnosis. Pt not a liver transplant candidate currently due to age, referrals to other centers have been placed. Previously when patient was a few years younger, liver transplant eval was pursed preemptively, but patient's  health was too good at the time.      6) Leukocytosis, improved   CLL   Follows at [facility].      7) Anemia, worsened    Thrombocytopenia, worsened    Chronic issues for patient due to liver disease.     Plan/Recs:  1) continue CRRT   2) no UF despite severe volume overload, not able to tolerate UF   3) updated  at bedside       Discussed with bedside RNs and ICU team Dr. Orozco.    Caroline Tirado MD   [facility]  Office: [phone]          Interval History:     - remains on three pressors  - no UF so is 7+ kg increased from yesterday with anasarca. Vent settings stable   - minimal UOP   - discussed with  at bedside   - high input, unable to UF anything without hemodynamic changes           Medications and Allergies:       hydrocortisone sodium succinate PF  50 mg Intravenous Q6H     pantoprazole  40 mg Per Feeding Tube QAM AC    Or     pantoprazole  40 mg Intravenous QAM AC     piperacillin-tazobactam  4.5 g Intravenous Q6H     sodium bicarbonate 150 mEq in sterile water (preservative free) 1,000 mL infusion   Intravenous During Dialysis/CRRT (from stock)     sodium chloride (PF)  3 mL Intracatheter Q8H     sodium chloride (PF) 0.9%  10 mL Intracatheter Once in dialysis/CRRT     thyroid  90 mg Oral Daily     [Held by provider] ursodiol  500 mg Oral BID        Allergies   Allergen Reactions     Contrast Dye Hives            Physical Exam:   Vitals were reviewed  BP (!) 86/49   Pulse 61   Temp (!) 96.6  F (35.9  C)   Resp 25   Wt 85.4 kg (188 lb 4.4 oz)   SpO2 94%   BMI 35.57 kg/m      Wt Readings from Last 3 Encounters:   05/24/23 85.4 kg (188 lb 4.4 oz)   05/21/23 72.1 kg (159 lb)   05/16/23 68 kg (150 lb)       Intake/Output Summary (Last 24 hours) at 5/24/2023 1527  Last data filed at 5/24/2023 1400  Gross per 24 hour   Intake 6436.99 ml   Output 76 ml   Net 6360.99 ml       GENERAL APPEARANCE: NAD, intubated, sedated   HEENT:  ETT in place   RESP: crackles  CV:  RRR  ABDOMEN: distended and firm  EXTREMITIES/SKIN: anasarca, 3+ edema in all extremities   NEURO:  sedated  LINES:  + guerra with drops of dark urine           Data:     BMP  Recent Labs   Lab 05/24/23  1245 05/24/23  1209 05/24/23  0811 05/24/23  0358 05/24/23  0355 05/23/23 2013 05/23/23 2010 05/23/23  1158 05/23/23  1154   NA  --  137  --   --  136  --  137  --  137   POTASSIUM  --  3.9  --   --  3.9  --  4.3  --  5.3   CHLORIDE  --  94*  --   --  94*  --  95*  --  99   SONYA  --  9.0  --   --  9.4  --  8.8  --  8.6*   CO2  --  17*  --   --  16*  --  15*  --  14*   BUN  --  28.9*  --   --  37.6*  --  46.9*  --  56.6*   CR  --  1.74*  --   --  2.03*  --  2.42*  --  3.04*   * 117* 98 97 106*   < > 93   < > 89    < > = values in this interval not displayed.     CBC  Recent Labs   Lab 05/24/23  1209 05/24/23  0951 05/24/23  0355 05/23/23 2010 05/23/23  1458   WBC 28.2*  --  30.3* 45.1* 58.1*   HGB 7.5* 7.6* 6.4* 7.2* 8.3*   HCT 23.0*  --  20.0* 22.0* 25.6*   MCV 98  --  100 100 100   PLT 17*  --  21* 36* 49*     Lab Results   Component Value Date    AST 42 (H) 05/24/2023    ALT 30 05/24/2023    ALKPHOS 125 (H) 05/24/2023    BILITOTAL 5.3 (H) 05/24/2023    BILICONJ 0.0 06/20/2014     Lab Results   Component Value Date    INR 4.66 (H) 05/24/2023       Attestation:  I have reviewed today's vital signs, notes, medications, labs and imaging.    Caroline Tirado MD  [facility] - Nephrology  Office: [phone]

## 2023-05-24 NOTE — PROGRESS NOTES
[facility]    Medicine Progress Note - Hospitalist Service    Date of Admission:  5/22/2023    Assessment & Plan   Teresa De Santiago is a 73 year old woman w/PMH PBC diagnosed approximately 10 years ago, esophageal varices, chronic anemia/thrombocytopenia, solitary kidney following very remote right nephrectomy for a nonmalignant mass, CLL who presented after having blood cultures returned positive for E. coli after visiting ED on 5/21 after she presented with malaise and abdominal pain that have been progressing over the several preceding days.     In the emergency department, Ms. De Santiago was fluid resuscitated with 2 L of crystalloid.  She was also incidentally found to be significantly hypoglycemic (glucose fell to 20 mg/dL) at which time she was given an amp of D50.  Due to the hypotension, the patient was started on norepinephrine with brisk response.  Had paracentesis w/ascitic fluid was cloudy and yellow.      Septic shock, persistent  E. coli SBP and bacteremia  -as above presented to ED with abd pain and paracentesis + e coli with blood cultures also positive.  -started on Vanc and Zosyn emperically but continued clinical deterioration despite aggressive care. ECHO with preserved EF  -MRSA negative so stop Vanc 5/24  -currently on levophed, epinephrine and vasopressin drips for shock-wean as able  -solucortef 50q6 for adrenal support  -palliative care following due to very poor prognosis, currently family still wants restorative care and remains full code    DANDRE on CKD stage 2 requiring CRRT  Hx solitary kidney  AGMA  -Baseline creatinine appears to be 1.1-1.2 over the last year.  Admission creatinine is 2.2 and is now anuric  -seen by nephrology and started on CRRT, also requiring bicarb but pH has been stable  -will likely need lifelong HD if she survives    Suspected DIC w/anemia, thrombocytopenia, low fibrinogen  Hx chronic anemia, thrombocytopenia  -likely due to underlying sepsis  but persistent and worsening despite PRBC's x1 on 5/24 and cryoprecipitate on 5/23  -additional dose of cryo ordered 5/24 and hematology has been consulted    Toxic and metabolic encephalopathy  -initially due to infection but now sedated on vent, limit sedatives as able    Hypoglycemia  -fluids adjusted to D10 at 50, nephrology following    Hx CLL, Primary biliary cirrhosis  -Patient is under the care of the [place] hepatology service but is not being offered a transplant. Follows with [facility] oncology as well, handy CLL in surveilance      Diet:   tube feeds  DVT Prophylaxis: Pneumatic Compression Devices  Cuevas Catheter: PRESENT, indication: Strict 1-2 Hour I&O  Lines: PRESENT      CVC Double Lumen Right Internal jugular-Site Assessment: WDL except;Ecchymotic  Arterial Line 05/22/23 Femoral-Site Assessment: WDL  Hemodialysis Vascular Access Femoral vein catheter Left Femoral vein-Site Assessment: WDL except;Edematous;Leaking      Cardiac Monitoring: ACTIVE order. Indication: ICU  Code Status: Full Code      Beka Johnson DO  Hospitalist Service  [facility]  Securely message with [employer] (more info)  Text page via [employer] Paging/Directory   ______________________________________________________________________    Interval History   Remains extremely critical, on 3 pressors as well as CRRT. Received 1 unit PRBC's with improvement to 7.5. Requiring bicarb drip and is sedated on ventilator. Discussion with  at bedside regarding poor prognosis, he's trying to come to terms with it and be hopeful at same time I feel. Seems to understand the gravity of her illness    Physical Exam   Vital Signs: Temp: (!) 96.7  F (35.9  C) Temp src: Temporal  Pulse: 61   Resp: 25 SpO2: 94 % O2 Device: Mechanical Ventilator    Weight: 188 lbs 4.37 oz    Constitutional: sedated, not moving extremeties  Eyes: constricted pupils bilaterally  ENT: ETT in place  Respiratory: ventilatory breath  sounds  Cardiovascular: regular rate and rhythm, no murmur  GI: muffled bowel sounds, soft and distended, ascites  Genitounirinary: guerra in place  Skin: bruising on extremeties  Neurologic: sedated, not moving extremeties or following commands    60 MINUTES SPENT BY ME on the date of service doing chart review, history, exam, documentation & further activities per the note.      Data   ------------------------- PAST 24 HR DATA REVIEWED -----------------------------------------------    I have personally reviewed the following data over the past 24 hrs:    28.2 (H)  \   7.5 (L)   / 17 (LL)     137 94 (L) 28.9 (H) /  111 (H)   3.9 17 (L) 1.74 (H) \       ALT: 30 AST: 42 (H) AP: 125 (H) TBILI: 5.3 (H)   ALB: 3.3 (L) TOT PROTEIN: 5.2 (L) LIPASE: N/A       Procal: N/A CRP: N/A Lactic Acid: 14.5 (HH)       INR:  4.66 (H) PTT:  75 (H)   D-dimer:  N/A Fibrinogen:  98 (LL)       Imaging results reviewed over the past 24 hrs:   No results found for this or any previous visit (from the past 24 hour(s)).

## 2023-05-24 NOTE — PROVIDER NOTIFICATION
05/24/23 1841   Critical Test Results/Notification   Critical Lab Result (Lab Name and Value) Lactic acid 13.5   What Time Did The Lab Notify You? 1830   Provider Notified yes   Date of Provider Notification 05/24/23   Time of Provider Notification 1841   Mechanism of Provider notification telephone   What Provider Did You Notify? Pam

## 2023-05-24 NOTE — PROGRESS NOTES
"Palliative Care Initial Social Work Note  Location: [facility]    Patient Info:  Teresa De Santiago is a 73 year old female with a past medical history of primary biliary cirrhosis that was diagnosed 10 years ago and appears to be end stage. She has anasarca, esophageal varices, chronic anemia, chronic thrombocytopenia related to her cirrhosis, she has a history of a right nephrectomy for nonmalignant mass, and recent diagnosis of CLL, who presented on 5/22 with positive blood cultures for Ecoli after being seen in the ED.    Brief summary of visit:   Joint visit with NP Agata Patel, palliative care. Met with spouse Yomi. Reviewed his understanding of medical conditions. He shared he values his relationship with [facility] providers and has been talking to them. He understands her condition to be \"grim\". He is having a hard time emotionally catching up to her profound change of condition as she was just planting flowers prior to hospitalization. They have three adult children, two of whom live with them. Offered care conference/telephone call support, declined for now.     Goals of care discussion occurred and the goal is restorative and Yomi is thinking about code status change but did not want to make any changes today.     Yomi is a retired  and Teresa worked at home managing their family. They have a solid support system.     Date of Admission: 5/22/2023    Reason for consult: Goals of care  Patient and family support    Sources of information: Family member SpouseYomi    Recommendations & Plan:  Palliative Care team following.      Symptoms & Concerns Addressed Today:  Caregiver -Offered care-giving support  Family  Spiritual -stella is a core value     Strengths Identified:  Engaged and supportive spouse.     Relationships & Support:  Aspects of relationships and support assessed today:    Identified family members: Spouse and 3 adult children     Professional supports: [facility] team     Family " coping: Adequately    Bereavement Risk concerns: None identified today.    Coping, Mental Health & Adjustment to Illness:   Adjustment to Illness/Hospitalization     No HX per chart review of depression/anxiety.     Goals, Decision Making & Advance Care Planning:   Prognosis, Goals, and/or Advance Care Planning were assessed today: Yes  If yes, brief summary of discussion: see above  Preferred language: English   Patient's decision making preferences: unable to assess  I have concerns about the patient/family's health literacy today: No  Patient has a completed Health Care Directive: No.   Code status per chart review: Full Code      Clinical Social Work Interventions:   Introduction of Palliative clinical social work interventions  Facilitation of processing of thoughts/feelings  Goals of care discussion/facilitation  Life review facilitation      CASIE Cat  [facility], Palliative Care  Securely message with the Vocera Web Console (learn more here)  Or please use Palliative Team Pager

## 2023-05-25 NOTE — PROGRESS NOTES
[facility] ICU VENTILATOR RESPIRATORY NOTE  Date of Admission: 5/22/23  Date of Intubation (most recent): 5/22/23  Reason for Mechanical Ventilation: Respiratory failure  Number of Days on Mechanical Ventilation: 4  Met Criteria for Pressure Support Trial: No  Reason for No Pressure Support Trial: Pt on pressors and CRRT. FiO2 > 50%  Significant Events Today: rate titrated down from 26 to 22 post ABG  ABG Results:   Recent Labs   Lab 05/25/23  0142 05/25/23  0004 05/24/23  1819 05/24/23  1214   PH 7.45 7.51* 7.46* 7.38   PCO2 36 30* 30* 32*   PO2 70* 69* 75* 70*   HCO3 25 24 21 19*   O2PER 50 50 50 50     ETT appearance on chest x-ray: 3.8cm above evelio    Vent Mode: CMV/AC  (Continuous Mandatory Ventilation/ Assist Control)  FiO2 (%): (S) 60 %  Resp Rate (Set): 20 breaths/min  Tidal Volume (Set, mL): 370 mL  PEEP (cm H2O): 5 cmH2O  Resp: 20      Plan:  Continue to monitor and assess      Hood Osorio, RT

## 2023-05-25 NOTE — PLAN OF CARE
ICU End of Shift Summary.  For vital signs and complete assessments, please see documentation flowsheets.     Pertinent assessments: RASS goal -1 to -2. Only fentanyl infusion at 25mcg per hour. Pt arouses to vigorous stimulation. LS clear, BS hypoactive - no BM this shift. Cuevas in place. Severe edema noted - weeping. Bruises scattered. Tele - SR/SB  Major Shift Events:   - Starting removing fluid with goal of net even per order. Nephrology and intensivist indicated: OK titrate levophed for fluid removal. Do not add epinephrine to accommodate fluid removal.   - CRRT fluids adjusted. Nephrology advised to replace bags to new solution when current bags require replacement.  - BG stable. D10 decreased to 10mL/hr  Plan (Upcoming Events): Continue CRRT, ICU cares   Discharge/Transfer Needs: TBD    Bedside Shift Report Completed : Y  Bedside Safety Check Completed: Y

## 2023-05-25 NOTE — PROVIDER NOTIFICATION
05/25/23 1242   Critical Test Results/Notification   Critical Lab Result (Lab Name and Value) Lactic acid   What Time Did The Lab Notify You? 1241   Provider Notified yes   Date of Provider Notification 05/25/23   Time of Provider Notification 1242   Mechanism of Provider notification verbal (face-to-face)   What Provider Did You Notify? Dr. Orozco

## 2023-05-25 NOTE — PROGRESS NOTES
Teresa De Santiago is a 73 year old female was called back to the ED on 5/22/2023 due to blood cultures turned positive that had been drawn when she first presented with malaise, belly distension, and belly pain on 5/21/23. She was hypotensive and hypoglycemic on presentation to the ER. She underwent paracentesis that eventually has proven to be the source of infection. She was started on broad spectrum abx, norepinephrine, and endotracheally intubated.     Medical history is notable for primary biliary cirrhosis that is currently followed at [facility].  Her hepatologist, Dr. Haley Cool's documentation indicates that she is no longer being considered for liver transplant.  She was diagnosed in March with CLL but is not on any therapy for that.  Remote history is notable for right nephrectomy for benign indication.  Her baseline creatinine is about 1.1.      Neuro: Patient is currently being sedated with fentanyl. Goal RASS 0 to -2, unless this interferes with ventilation/oxygenation/hemodynamics. In that case, we will target a lower RASS. Currently, still deeply sedated despite stopping versed. Most likely midazolam still washing out. If she is not waking tomorrow, will get head CT    Pulm: Currently with low vent requirements. Most recent CXR shows likely small pleff left base with adjacent opacity. Will watch.    CV: Profound septic shock. Lactate peaked at 16. Now continuing to decrease slowly to 6.2. Continuing to decrease pressors. NE .18 and vaso straight rate 2.4. On steroids for refractory shock.    GI: Profound shock prevents enteral feeding for now. Will follow and feed as able. Will likely start trophic feeds tomorrow if patient remains on this positive trajectory. Albumin 3.3. SBP with E coli growth from fluid. Given albumin and antibiotics.    Renal: Anuric. CRRT. Today starting to pull fluid while watching lactate and pressor requirements. Hypokalemic, repleted. Bicarb stopped 2/2 metabolic  alkalosis.    Endo: On dextrose drip for hypoglycemia, maintaining reasonable glucose levels with this. On [place] for thyroid dysfunction.    ID: E. Coli bacteremia. E coli in ascites. Pansensitive but given patient's severely ill state will continue with Zosyn. MRSA swab negative. Vanc stopped.    Heme: Platelets low, INR high, fibrinogen low. Likely 2/2 sepsis/DIC. Smear sent for schistocytes. Fibrinogen responded to cryo. WBC decreased to 20 from 60. Does have CLL but not treated. C/w her overwhelming sepsis state. Will give platelets for under [id number] and cryo for fibrinogen under 100 and FFP for increased INR. Heme consulted for any additional thoughts.    PPx: VTE, GI, VAE    I spoke at length with her  and son today who were agreeable to plan of care and expressed appreciation of our team's care.    65 min of critical care time spent by me in caring for this patient exclusive of procedures.    Critical Care Problems:  Acute respiratory failure  Refractory septic shock  Ecoli bacteremia  Lactic acidosis - profound  DANDRE necessitating CRRT

## 2023-05-25 NOTE — PROGRESS NOTES
Teresa De Santiago is a 73 year old female was called back to the ED on 5/22/2023 due to blood cultures turned positive that had been drawn when she first presented with malaise, belly distension, and belly pain on 5/21/23. She was hypotensive and hypoglycemic on presentation to the ER. She underwent paracentesis that eventually has proven to be the source of infection. She was started on broad spectrum abx, norepinephrine, and endotracheally intubated.     Medical history is notable for primary biliary cirrhosis that is currently followed at [facility].  Her hepatologist, Dr. Haley Cool's documentation indicates that she is no longer being considered for liver transplant.  She was diagnosed in March with CLL but is not on any therapy for that.  Remote history is notable for right nephrectomy for benign indication.  Her baseline creatinine is about 1.1.      Neuro: Patient is currently being sedated with versed and fentanyl. Goal RASS 0 to -2, unless this interferes with ventilation/oxygenation/hemodynamics. In that case, we will target a lower RASS.    Pulm: Currently with low vent requirements.Most recent CXR shows likely small pleff left base with adjacent opacity. Will watch.    CV: Profound septic shock. Lactate peaked yesterday at 16. Now 14.5. Decreased but still very high pressor requirements. 0.32 NE, vaso 2.4 straight rate, epi turned off today. On steroids for refractory shock.    GI: Profound shock prevents enteral feeding for now. Will follow and feed as able. Albumin 3.3. Slight elevation in transaminases, alk phos, bili. SBP with E coli growth from fluid. Given albumin and antibiotics.    Renal: Anuric. CRRT since yesterday. Clearance only for now, without ultrafiltration until patient's BP/lactate consistently better or until oxygen requirements increase 2/2 volume    Endo: On dextrose drip for hypoglycemia, maintaining reasonable glucose levels with this. On [place] for thyroid dysfunction.    ID:  E. Coli bacteremia. E coli in ascites. Pansensitive but given patient's severely ill state will continue with Zosyn. MRSA swab negative. Vanc stopped.    Heme: Platelets low, INR high, fibrinogen low. Likely 2/2 sepsis/DIC. Smear sent for schistocytes. Given cryo today. WBC decreased to 20 from 60. Does have CLL but not treated. C/w her overwhelming sepsis state. Will give platelets for under 62542 and cryo for fibrinogen under 100 and FFP for increased INR. Heme consulted for any additional thoughts.    PPx: VTE, GI, VAE    I spoke at length with her  and son today who were agreeable to plan of care and expressed appreciation of our team's care.    65 min of critical care time spent by me in caring for this patient exclusive of procedures.    Critical Care Problems:  Acute respiratory failure  Refractory septic shock  Ecoli bacteremia  Lactic acidosis - profound  DANDRE necessitating CRRT

## 2023-05-25 NOTE — PROGRESS NOTES
[facility] ICU VENTILATOR RESPIRATORY NOTE    Date of Admission: 5/22/23  Date of Intubation (most recent): 5/22/23  Reason for Mechanical Ventilation: Respiratory failure  Number of Days on Mechanical Ventilation: 4    Met Criteria for Pressure Support Trial: No  Reason for No Pressure Support Trial: Patient on pressors and CRRT    Significant Events Today: NA    Vital Signs:  Temp: (!) 96.7  F (35.9  C) Temp src: Temporal BP: 115/51 Pulse: 54   Resp: 18 SpO2: 93 % O2 Device: Mechanical Ventilator       ABG Results:   Recent Labs   Lab 05/25/23  1222 05/25/23  0605 05/25/23  0142 05/25/23  0004   PH 7.51* 7.47* 7.45 7.51*   PCO2 38 37 36 30*   PO2 72* 83 70* 69*   HCO3 30* 27 25 24   O2PER 60 60 50 50     VBG Results: Venous Blood Gas  Recent Labs   Lab 05/25/23  1222 05/25/23  0605 05/25/23 0142 05/25/23  0004 05/22/23 1954 05/22/23  0841   HCO3V  --   --   --   --   --  17*   O2PER 60 60 50 50   < >  --     < > = values in this interval not displayed.         Vent Mode: CMV/AC  (Continuous Mandatory Ventilation/ Assist Control)  FiO2 (%): 60 %  Resp Rate (Set): 18 breaths/min  Tidal Volume (Set, mL): 370 mL  PEEP (cm H2O): 5 cmH2O  Resp: 18      Plan:  RT will continue to monitor    Meera Valenzuela RT

## 2023-05-25 NOTE — PROGRESS NOTES
[facility]    Medicine Progress Note - Hospitalist Service    Date of Admission:  5/22/2023    Assessment & Plan   Teresa De Santiago is a 73 year old woman w/PMH PBC diagnosed approximately 10 years ago, esophageal varices, chronic anemia/thrombocytopenia, solitary kidney following very remote right nephrectomy for a nonmalignant mass, CLL who presented after having blood cultures returned positive for E. coli after visiting ED on 5/21 after she presented with malaise and abdominal pain that have been progressing over the several preceding days.     In the emergency department, Ms. De Santiago was fluid resuscitated with 2 L of crystalloid.  She was also incidentally found to be significantly hypoglycemic (glucose fell to 20 mg/dL) at which time she was given an amp of D50.  Due to the hypotension, the patient was started on norepinephrine with brisk response.  Had paracentesis w/ascitic fluid was cloudy and yellow.     Had acute worsening after admission requiring 3 pressors and CRRT. Has shown some evidence of improvement and epinephrine stopped 5/24. Has required multiple transfusions of blood products including cryo, FFP, PRBC's and PLT's for DIC.     Septic shock, persistent  E. coli SBP and bacteremia  -as above presented to ED with abd pain and paracentesis + e coli with blood cultures also positive.  -started on Vanc and Zosyn emperically but continued clinical deterioration despite aggressive care. ECHO with preserved EF  -MRSA negative so stop Vanc 5/24  -currently on levophed and vasopressin drip. Epinephrine stopped 5/24.  -solucortef 50q6 for adrenal support  -palliative care following due to very poor prognosis, currently family still wants restorative care and remains full code  -repeat blood cx 5/24 no growth    Lactic acidosis  -likely multifactorial from sepsis, renal failure. Concern for underlying ischemic abd process  -slowly improving    DANDRE on CKD stage 2 requiring CRRT  Hx  solitary kidney  AGMA  -Baseline creatinine appears to be 1.1-1.2 over the last year.  Admission creatinine is 2.2  -Cr is improving with CRRT but remains anuric, nephrology following    Suspected DIC w/anemia, thrombocytopenia, low fibrinogen  Hx chronic anemia, thrombocytopenia  -likely due to underlying sepsis but persistent and worsening despite PRBC's x1 on 5/24 and cryoprecipitate on 5/23,24  -Hgb again low, additional PRBC ordered this am  -coag labs improving with above interventions    Toxic and metabolic encephalopathy  -initially due to infection but now sedated on vent, limit sedatives as able    Hypoglycemia  -fluids adjusted to D10 at 50, nephrology following    Hx CLL, Primary biliary cirrhosis  -Patient is under the care of the [place] hepatology service but is not being offered a transplant. Follows with [place] oncology as well, handy CLL in surveilance     Hypokalemia  -replete     Diet:   tube feeds  DVT Prophylaxis: Pneumatic Compression Devices  Cuevas Catheter: PRESENT, indication: Strict 1-2 Hour I&O  Lines: PRESENT      CVC Double Lumen Right Internal jugular-Site Assessment: WDL except;Ecchymotic  Arterial Line 05/22/23 Femoral-Site Assessment: WDL Except;Leaking;Edematous  Hemodialysis Vascular Access Femoral vein catheter Left Femoral vein-Site Assessment: WDL except;Edematous;Leaking      Cardiac Monitoring: ACTIVE order. Indication: ICU  Code Status: Full Code      Beka Johnson DO  Hospitalist Service  [facility]  Securely message with [email or web address] (more info)  Text page via [employer] Paging/Directory   ______________________________________________________________________    Interval History   Was able to come off epinephrine drip overnight, still remains on 2 pressors but coming down and lactic is normalizing. Remains on CRRT, intubated and sedated. DIC has improved after multiple blood products, Hgb was low and 1 unit ordered this am    Physical Exam   Vital Signs:  Temp: (!) 96.7  F (35.9  C) Temp src: Temporal BP: 115/51 Pulse: 50   Resp: 18 SpO2: 92 % O2 Device: Mechanical Ventilator    Weight: 195 lbs 15.82 oz  Constitutional: sedated, not moving extremeties  Eyes: constricted pupils bilaterally  ENT: ETT in place  Respiratory: ventilatory breath sounds, no wheezing  Cardiovascular: regular rate and rhythm, no murmur  GI: muffled bowel sounds, soft and distended, ascites  Genitounirinary: guerra in place  Skin: bruising on extremeties  Neurologic: sedated, not moving extremeties or following commands    60 MINUTES SPENT BY ME on the date of service doing chart review, history, exam, documentation & further activities per the note.      Data   ------------------------- PAST 24 HR DATA REVIEWED -----------------------------------------------    I have personally reviewed the following data over the past 24 hrs:    20.0 (H)  \   7.6 (L)   / 33 (LL)     136 93 (L) 17.7 /  90   3.2 (L) 27 1.21 (H) \       ALT: 31 AST: 49 (H) AP: 109 (H) TBILI: 6.2 (H)   ALB: 3.2 (L) TOT PROTEIN: 5.0 (L) LIPASE: N/A       Procal: N/A CRP: N/A Lactic Acid: 8.0 (HH)       INR:  2.37 (H) PTT:  48 (H)   D-dimer:  N/A Fibrinogen:  134 (L)       Ferritin:  N/A % Retic:  N/A LDH:  167       Imaging results reviewed over the past 24 hrs:   No results found for this or any previous visit (from the past 24 hour(s)).

## 2023-05-25 NOTE — PLAN OF CARE
ICU End of Shift Summary.  For vital signs and complete assessments, please see documentation flowsheets.      Pertinent assessments: Pt remained sedated to a RASS score of -4 on low dose Fentanyl. Starting to wake up more by AM responding more to oral cares and turns but not following commands. CPOT 0. Temp remains low in the 96s temporally. Bear hugger on full heat all shift. LSs remain clear except for diminished LLL. Suctioned only a scant amount of cloudy sputum. Tele SR/SB in the low 50s at lowest. Cuevas with only 31 mL out this shift. No BM.  Extensive bruising remains on bilateral forearms - only slightly extended from lines on R arm at the start of the shift but has not extended since then. Remains soft.  Major Shift Events: - Pt given 2 units of plasma and 1 unit of platelets at the start of the shift per Dr. Orozco. INR remained high and Platelets remained low at recheck. Tele-Hub notified of that along with low hemoglobin. Orders placed for 1 unit of blood and 1 unit of platelets. No signs of bleeding this shift.             - Levophed weaned from 0.32 mcg/kg/min to 0.22 mcg/kg/min.             - D10 drip rate decreased with continued stable BSs so shut off per Dr. Rea. BSs remained stable throughout shift.              - 0036 - Vent changes made after ABG results per Dr. Rea. ABG recheck improved.                      -  FiO2 increased to 60% from 50% per RT d/t low PaO2 from ABG. Sats in the mid 90s.               - Weight up 3 1/2 kg.              - 0655 - Updated Yomi.  Plan (Upcoming Events): Continue CRRT per Nephro recommendations. Monitor labs closely and replace/transfuse as needed. HemeOnc and Palliative following.  Discharge/Transfer Needs: TBD. Continue ICU cares for now.     Bedside Shift Report Completed   Bedside Safety Check Completed  Goal Outcome Evaluation:      Plan of Care Reviewed With: patient    Overall Patient Progress: no changeOverall Patient Progress: no change

## 2023-05-25 NOTE — PROGRESS NOTES
Tele-intensivist note  With pH 7.51, vent rate decreased 26 to 20 with follow up abg after.    doreen philippe  May 25, 2023
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
## 2023-05-26 NOTE — PLAN OF CARE
Goal Outcome Evaluation:  Cont to be minimally responsive despite off sedation. Seeing some grimacing with cares. Does not open eyes or follow commands. Taken down for head CT. Results neg.    Full vent support. Min secretions.  Slow TF started through OG.  Cuevas with scant concentrated urine output.  Skin taut from swelling. Bruising areas. Large amt of weeping areas.   CRRT Slowly able to pull neg fluid volume as blood pressure allows. Weaning norepinephrine as able.   Freq lab draws.   Family updated. Cont cares.

## 2023-05-26 NOTE — PROGRESS NOTES
[facility] ICU VENTILATOR RESPIRATORY NOTE  Date of Admission: 5/22/23  Date of Intubation (most recent): 5/22/23  Reason for Mechanical Ventilation: Respiratory failure  Number of Days on Mechanical Ventilation: 5  Met Criteria for Pressure Support Trial: No  Reason for No Pressure Support Trial: Pt on pressors and CRRT. FiO2 > 50%  Significant Events Today: rate titrated down from 26 to 22 post ABG  ABG Results:   Recent Labs   Lab 05/26/23  0000 05/25/23  1816 05/25/23  1222 05/25/23  0605   PH 7.48* 7.48* 7.51* 7.47*   PCO2 40 40 38 37   PO2 64* 57* 72* 83   HCO3 30* 30* 30* 27   O2PER 50 45 60 60     ETT appearance on chest x-ray: 3.8cm above evelio    Vent Mode: CMV/AC  (Continuous Mandatory Ventilation/ Assist Control)  FiO2 (%): 55 %  Resp Rate (Set): 18 breaths/min  Tidal Volume (Set, mL): 370 mL  PEEP (cm H2O): 5 cmH2O  Resp: 18      Plan:  Continue to monitor and assess      Hood Osorio, RT

## 2023-05-26 NOTE — PHARMACY
Pharmacy Tube Feeding Consult    Medication reviewed for administration by feeding tube and for potential food/drug interactions.    Recommendation: Recommend changing the following medications to a liquid dosage form: pantoprazole     Pharmacy will continue to follow as new medications are ordered.

## 2023-05-26 NOTE — PROGRESS NOTES
Hematology and Oncology Chart Check:     Latest Reference Range & Units 05/26/23 11:39   WBC 4.0 - 11.0 10e3/uL 22.8 (H)   Hemoglobin 11.7 - 15.7 g/dL 8.1 (L)   Hematocrit 35.0 - 47.0 % 23.2 (L)   Platelet Count 150 - 450 10e3/uL 17 (LL)   RBC Count 3.80 - 5.20 10e6/uL 2.56 (L)   MCV 78 - 100 fL 91   MCH 26.5 - 33.0 pg 31.6   MCHC 31.5 - 36.5 g/dL 34.9   RDW 10.0 - 15.0 % 19.1 (H)        Latest Reference Range & Units 05/26/23 06:03   INR 0.85 - 1.15  2.88 (H)   PTT 22 - 38 Seconds 50 (H)   Fibrinogen 170 - 490 mg/dL 85 (LL)     Patient received cryo this AM.     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 Coli bacteremia, severe lactic acidosis, SBP, multi-organ failure. She has been intubated. She is requiring CRRT for DANDRE.      #DIC due to underlying septic shock/E. Coli bacteremia and multi-organ failure  #Acute anemia  #Acute thrombocytopenia  #Acute neutrophilia  #Direct hyperbilirubinemia  #Coagulopathy  #History of CLL, IGHV mutated, in observation  -Peripheral smear is negative for schistocytes, making MAHA/TTP/HUS less likely.  -Patient with direct hyperbilirubinemia. LDH normal at 167. Haptoglobin 4. ARNEL is 2+ and patient is already on cortef. This is due to underlying sepsis.   -I have placed coag studies, including fibrinogen every 8 hours.   -INR iimproved s/p FFP. Can also consider VitK.  -Transfuse 5U cryoprecipitate for fibrinogen <100 (she is on CRRT, which may be contributing to the refractoriness of cryo transfusion, in addition to ongoing septic shock).  -Transfuse PRBC for Hb </=7.  -Transfuse PLT for PLT </= 20K.     #Acute VDRF  #Acute septic shock/E. Coli bacteremia  #Acute decompensated cirrhosis with underlying PBC  -Patient remains vented and sedated.  -She remains on pressors (epi/vaso/levo) and stress-dose cortef.     #Acute  renal failure on CRRT  -Nephrology following.     Hematology to follow peripherally over the weekend. Please contact our team with any further questions or concerns.     Natalie Castro DO  Hematology/Oncology  [facility]

## 2023-05-26 NOTE — PROGRESS NOTES
"[facility]  Respiratory Care Note    [facility] ICU VENTILATOR RESPIRATORY NOTE  Date of Admission: 05/22/2023  Date of Intubation (most recent): 05/22/2023  Reason for Mechanical Ventilation: Respiratory Failure  Number of Days on Mechanical Ventilation: 5   Met Criteria for Pressure Support Trial: No  Length of Pressure Support Trial:   Reason for Stopping Pressure Support Trial:   Reason for No Pressure Support Trial: unstable pt  Significant Events Today:   ABG Results: 7.46/42/70/29 @ 0602 05/26/2023  ETT appearance on chest x-ray: 3.8 cm above [place]    Plan:  Will continue to monitor and assess the pt's current respiratory status and needs, in hopes of liberating her from the ventilator.     Vent Mode: CMV/AC  (Continuous Mandatory Ventilation/ Assist Control)  FiO2 (%): (S) 45 %  Resp Rate (Set): 18 breaths/min  Tidal Volume (Set, mL): 370 mL  PEEP (cm H2O): 5 cmH2O  Resp: 17    Vital signs:  Temp: (!) 96.7  F (35.9  C) Temp src: Temporal   Pulse: 71   Resp: 17 SpO2: 95 % O2 Device: Mechanical Ventilator     Weight: 87.8 kg (193 lb 9 oz)  Estimated body mass index is 36.57 kg/m  as calculated from the following:    Height as of 5/21/23: 1.549 m (5' 1\").    Weight as of this encounter: 87.8 kg (193 lb 9 oz).    Recent Labs   Lab 05/26/23  0602 05/26/23  0000 05/25/23  1816 05/25/23  1222   PH 7.45 7.48* 7.48* 7.51*   PCO2 42 40 40 38   PO2 70* 64* 57* 72*   HCO3 29* 30* 30* 30*   O2PER 55 50 45 60       Past Medical History:   Diagnosis Date     Cirrhosis, primary biliary (H)      Thyroid disease 20's       Past Surgical History:   Procedure Laterality Date     GALLBLADDER SURGERY       MASTECTOMY  1994     NEPHRECTOMY         Family History   Problem Relation Age of Onset     Cancer Mother      Heart Disease Mother      Osteoporosis Mother      Cerebrovascular Disease Mother      Seizure Disorder Mother      Thyroid Disease Mother      High cholesterol Other         sibling     " Thyroid Disease Other         sibling       Social History     Tobacco Use     Smoking status: Never     Smokeless tobacco: Never   Vaping Use     Vaping status: Not on file   Substance Use Topics     Alcohol use: No     Alcohol/week: 0.0 standard drinks of alcohol     Ge GALVAN  [facility]  5/26/2023

## 2023-05-26 NOTE — PROGRESS NOTES
[facility]    Medicine Progress Note - Hospitalist Service    Date of Admission:  5/22/2023    Assessment & Plan   Teresa De Santiago is a 73 year old woman w/PMH PBC diagnosed approximately 10 years ago, esophageal varices, chronic anemia/thrombocytopenia, solitary kidney following very remote right nephrectomy for a nonmalignant mass, CLL who presented after having blood cultures returned positive for E. coli after visiting ED on 5/21 after she presented with malaise and abdominal pain that have been progressing over the several preceding days.     In the emergency department, Ms. De Santiago was fluid resuscitated with 2 L of crystalloid.  She was also incidentally found to be significantly hypoglycemic (glucose fell to 20 mg/dL) at which time she was given an amp of D50.  Due to the hypotension, the patient was started on norepinephrine with brisk response.  Had paracentesis w/ascitic fluid was cloudy and yellow.     Had acute worsening after admission requiring 3 pressors and CRRT. Has shown some evidence of improvement and epinephrine stopped 5/24. Has required multiple transfusions of blood products including cryo, FFP, PRBC's and PLT's for DIC.     Clinically slowly improving but holding sedation to help evaluate neurologic function. Requiring less pressor support.     Septic shock, persistent  E. coli SBP and bacteremia  -as above presented to ED with abd pain and paracentesis + e coli with blood cultures also positive.  -started on Vanc and Zosyn emperically but continued clinical deterioration despite aggressive care. ECHO with preserved EF  -MRSA negative so stop Vanc 5/24, remains on Zosyn  -currently on levophed (dose reduced) and vasopressin drip. Epinephrine infusion stopped 5/24.  -solucortef 50q6 for adrenal support  -palliative care following due to very poor prognosis, currently family still wants restorative care and remains full code  -repeat blood cx 5/24 no growth    Lactic  acidosis  -likely multifactorial from sepsis, renal failure  -markedly improved, mildly elevated now    DANDRE on CKD stage 2 requiring CRRT  Hx solitary kidney  AGMA  -Baseline creatinine appears to be 1.1-1.2 over the last year.  Admission creatinine is 2.2  -Cr is improved with CRRT, remains anuric but small amount of urine productioni now  -nephrology following    Suspected DIC w/anemia, thrombocytopenia, low fibrinogen  Hx chronic anemia, thrombocytopenia  -likely due to underlying sepsis but persistent and worsening despite PRBC's x1 on 5/24 and cryoprecipitate on 5/23,24  -additional PRBC 5/25, Hgb improving  -coag labs improving with above interventions    Toxic and metabolic encephalopathy  -initially due to infection, metabolic derangements and then sedated on vent  -sedatives now held, awaiting head CT to try to assess neurologic function    Hypoglycemia  -fluids adjusted to D10 at 50, nephrology following    Hx CLL, Primary biliary cirrhosis  -Patient is under the care of the [facility] hepatology service but is not being offered a transplant. Follows with [facility] oncology as well, handy CLL in surveilance     Hypokalemia  -replete     Diet:   tube feeds  DVT Prophylaxis: Pneumatic Compression Devices  Cuevas Catheter: PRESENT, indication: Strict 1-2 Hour I&O  Lines: PRESENT      CVC Double Lumen Right Internal jugular-Site Assessment: WDL except;Ecchymotic  Arterial Line 05/22/23 Femoral-Site Assessment: WDL Except;Edematous;Leaking  Hemodialysis Vascular Access Femoral vein catheter Left Femoral vein-Site Assessment: WDL except;Edematous;Leaking      Cardiac Monitoring: ACTIVE order. Indication: ICU  Code Status: Full Code        Beka Johnson DO  Hospitalist Service  [facility]  Securely message with [email or web address] (more info)  Text page via [employer] Paging/Directory   ______________________________________________________________________    Interval History   Continues to slowly improve, off  sedation and not following commands, remains intubated. Lactic acid is improving and requiring less levophed, remains on set rate of vasopressin.     Physical Exam   Vital Signs: Temp: (!) 96.7  F (35.9  C) Temp src: Temporal   Pulse: 63   Resp: 18 SpO2: 98 % O2 Device: Mechanical Ventilator    Weight: 193 lbs 9.02 oz  Constitutional: sedated, not moving extremeties  Eyes: constricted but reactive pupils bilaterally  ENT: ETT in place  Respiratory: ventilatory breath sounds, no wheezing  Cardiovascular: regular rate and rhythm, no murmur  GI: muffled bowel sounds, distended, ascites becoming more tense  Genitounirinary: guerra in place  Skin: bruising on extremeties  Neurologic: sedated, not moving extremeties or following commands, faint upgoing babinski on L    65 MINUTES SPENT BY ME on the date of service doing chart review, history, exam, documentation & further activities per the note.      Data   ------------------------- PAST 24 HR DATA REVIEWED -----------------------------------------------    I have personally reviewed the following data over the past 24 hrs:    24.8 (H)  \   8.0 (L)   / 27 (LL)     137 99 16.9 /  107 (H)   4.0 27 1.10 (H) \       ALT: 36 (H) AST: 60 (H) AP: 137 (H) TBILI: 7.0 (H)   ALB: 3.2 (L) TOT PROTEIN: 4.7 (L) LIPASE: N/A       Procal: N/A CRP: N/A Lactic Acid: 3.7 (H)       INR:  2.88 (H) PTT:  50 (H)   D-dimer:  N/A Fibrinogen:  85 (LL)       Imaging results reviewed over the past 24 hrs:   Recent Results (from the past 24 hour(s))   CT Head w/o Contrast    Narrative    CT HEAD W/O CONTRAST 5/26/2023 11:05 AM    INDICATION: off sedation, not waking. Acute pathology?  TECHNIQUE: CT scan of the head without contrast. Dose reduction  techniques were used.  CONTRAST: None.  COMPARISON: None.    FINDINGS:   No intracranial hemorrhage, extraaxial collection, mass effect or CT  evidence of acute infarct.  Mild presumed chronic small vessel  ischemic changes. Mild generalized volume loss.  The ventricles are  proportional to the sulci. Benign-appearing thinning in the right  frontal and right parietal calvarium. Postoperative changes to the  bilateral lenses. Paranasal sinuses are free of significant disease.  Clear mastoid air cells.      Impression    IMPRESSION:  Mild age-related changes as above with no acute intracranial  abnormality.    STEPHANIE DALTON MD         SYSTEM ID:  [id number]

## 2023-05-26 NOTE — PROGRESS NOTES
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       1)Anuric DANDRE   ATN   In setting of hypotension, sepsis, E coli bacteremia, peritonitis, and cirrhosis. Ischemic ATN. Started CRRT 5/23 though had lengthy discussion with patient's , given continued high lactate and high pressor needs, overall prognosis remains poor. Will provide time-limited trial of dialysis, but would not recommend chronic dialysis if indicated.   CRRT started 5/23                  - L femoral mary                 - pre-filter phoxillum 4K 1000 ml/h                  - dialysate phoxillum 4K 1000 ml/h                  - post-filter phoxillum 4K 200 ml/h                 - goal UF net negative 100 ml/h if able     2) Septic shock due to E coli bacteremia   SBP   Improving, now down to 2 pressors.     3) Severe lactic acidosis, improved   AGMA, resolved  Lactate improved, took awhile.  Developed some alkalosis, thus switched post-filter solution.     4) CKD II   Solitary kidney   History of R nephrectomy   Baseline Cr 1-1.2 in setting of solitary kidney.        5) Acute liver failure  Cirrhosis due to PBC, decompensated   Coagulopathy   Slowly worsening cirrhosis since diagnosis with CLL. Dr. Cool, primary hepatologist has been concerned that CLL may be affecting her liver since liver decompensation correlating with CLL diagnosis. Pt not a liver transplant candidate currently due to age, referrals to other centers have been placed. Previously when patient was a few years younger, liver transplant eval was pursed preemptively, but patient's health was too good at the time.      6) Leukocytosis, improved   CLL   Follows at [facility].      7) Anemia  Thrombocytopenia  Chronic issues for  patient due to liver disease. Worsened due to sepsis and infection, requiring intermittent transfusions.     Plan/Recs:  1) continue CRRT   2) attempting net negative 100 ml/h     Discussed with bedside RN.    Caroline Tirado MD   [facility]  Office: [phone]          Interval History:     - no acute events overnight   - levophed reduced to 0.1/h  - still not waking up much despite midazolam stopped, so getting CT head today   - CRRT going well, were able to get to net even. Remains edematous, will try for net negative today   - lactate finally with significant improvement to 3.7.           Medications and Allergies:       hydrocortisone sodium succinate PF  50 mg Intravenous Q6H     pantoprazole  40 mg Per Feeding Tube QAM AC    Or     pantoprazole  40 mg Intravenous QAM AC     piperacillin-tazobactam  4.5 g Intravenous Q6H     sodium chloride (PF)  3 mL Intracatheter Q8H     thyroid  90 mg Oral Daily     [Held by provider] ursodiol  500 mg Oral BID        Allergies   Allergen Reactions     Contrast Dye Hives            Physical Exam:   Vitals were reviewed  /51   Pulse 52   Temp (!) 96.7  F (35.9  C) (Temporal)   Resp 18   Wt 87.8 kg (193 lb 9 oz)   SpO2 96%   BMI 36.57 kg/m      Wt Readings from Last 3 Encounters:   05/26/23 87.8 kg (193 lb 9 oz)   05/21/23 72.1 kg (159 lb)   05/16/23 68 kg (150 lb)       Intake/Output Summary (Last 24 hours) at 5/24/2023 1527  Last data filed at 5/24/2023 1400  Gross per 24 hour   Intake 6436.99 ml   Output 76 ml   Net 6360.99 ml       GENERAL APPEARANCE: NAD, intubated, sedated   HEENT:  ETT in place   RESP: crackles  CV: RRR  ABDOMEN: distended and firm  EXTREMITIES/SKIN: anasarca, 3-4+ edema in all extremities with weeping  NEURO:  sedated  LINES:  + ugerra with a couple ml of concentrated urine           Data:     BMP  Recent Labs   Lab 05/26/23  0746 05/26/23  0435 05/26/23  0412 05/26/23  0410 05/26/23  0018 05/25/23  1958 05/25/23  1216  05/25/23  1211 05/25/23  0406 05/25/23  0401   NA  --   --  137  --   --  135*  --  136  --  137   POTASSIUM  --   --  4.0  --   --  3.7  --  3.2*  --  3.5   CHLORIDE  --   --  99  --   --  95*  --  93*  --  93*   SONYA  --   --  8.4*  --   --  9.2  --  9.7  --  9.4   CO2  --   --  27  --   --  26  --  27  --  24   BUN  --   --  16.9  --   --  16.4  --  17.7  --  20.9   CR  --   --  1.10*  --   --  1.13*  --  1.21*  --  1.32*   * 155* 79 67*   < > 79   < > 94   < > 112*    < > = values in this interval not displayed.     CBC  Recent Labs   Lab 05/26/23  0412 05/25/23 1958 05/25/23  1211 05/25/23  1012 05/25/23  0401   WBC 24.8* 22.1* 20.0*  --  20.9*   HGB 8.0* 7.8* 7.6* 7.5* 6.7*   HCT 23.6* 22.6* 22.4*  --  19.4*   MCV 92 91 93  --  94   PLT 27* 20* 33*  --  17*     Lab Results   Component Value Date    AST 60 (H) 05/26/2023    ALT 36 (H) 05/26/2023    ALKPHOS 137 (H) 05/26/2023    BILITOTAL 7.0 (H) 05/26/2023    BILICONJ 0.0 06/20/2014     Lab Results   Component Value Date    INR 2.88 (H) 05/26/2023       Attestation:  I have reviewed today's vital signs, notes, medications, labs and imaging.    Caroline Tirado MD  [facility] - Nephrology  Office: [phone]

## 2023-05-26 NOTE — PLAN OF CARE
ICU End of Shift Summary.  For vital signs and complete assessments, please see documentation flowsheets.      Pertinent assessments: Patient remains intubated and sedated, RASS -4 despite very minimal sedation. LOC improving - patient grimacing with turns and ETT suction. Tele SR - intermittently bradycardic, self-limiting. MAPs > 65 with pressors. Able to significantly wean down Norepi. Temps cool - BareHugger and blankets remain in place. Patient is very edematous, to the point of extremities weeping and blistering. No BM this shift. Remains NPO - D10 infusing. PRN D50 given x1 for hypoglycemia with improvement.   Major Shift Events: Hypoglycemia - D50 given. Decrease in Norepi. Labs normalizing.   Plan (Upcoming Events): Continue current plan of care.   Discharge/Transfer Needs: TBD     Bedside Shift Report Completed : Y  Bedside Safety Check Completed: Y    CRRT STATUS NOTE    Pressures WNL:  YES  Filter Status:  WDL    Problems Reported/Alarms Noted: Extremely high return pressure - access and return swapped with improvement.     ASSESSMENT:  Patient Net Fluid Balance: Remains net positive. Oliguric.   Vital Signs: Temp:  [93.2  F (34  C)-97.6  F (36.4  C)] 96.6  F (35.9  C)  Pulse:  [50-83] 67  Resp:  [12-20] 18  MAP:  [61 mmHg-123 mmHg] 93 mmHg  Arterial Line BP: ()/(45-93) 130/67  FiO2 (%):  [45 %-60 %] 55 %  SpO2:  [88 %-98 %] 95 %     Temperature managed with   Blood warmer YES  Forced warm air YES  Blankets YES  Goals of Therapy: Net even.      INTERVENTIONS/Significant Events:   Lines reversed, Filter replaced x1, Mag replaced x1.     PLAN/Meeting Goal:  Continue with CRRT per plan of care. Fluid removal per plan of care. Due for filter change at 5/28/23 2230 or sooner if indicated.     Goal Outcome Evaluation:  Plan of Care Reviewed With: spouse, patient  Overall Patient Progress: improving

## 2023-05-26 NOTE — PROGRESS NOTES
Teresa De Santiago is a 73 year old female was called back to the ED on 5/22/2023 due to blood cultures turned positive that had been drawn when she first presented with malaise, belly distension, and belly pain on 5/21/23. She was hypotensive and hypoglycemic on presentation to the ER. She underwent paracentesis that eventually has proven to be the source of infection. She was started on broad spectrum abx, norepinephrine, and endotracheally intubated.     Medical history is notable for primary biliary cirrhosis that is currently followed at [facility].  Her hepatologist, Dr. Haley Cool's documentation indicates that she is no longer being considered for liver transplant.  She was diagnosed in March with CLL but is not on any therapy for that.  Remote history is notable for right nephrectomy for benign indication.  Her baseline creatinine is about 1.1.      Neuro: Fentanyl drip stopped today. Goal RASS 0 to -2, unless this interferes with ventilation/oxygenation/hemodynamics. In that case, we will target a lower RASS. Currently, still deeply sedated despite stopping versed. Some grimacing, no purposeful movement. Most likely midazolam still washing out along with septic encephalopathy. CT today showed no acute intracranial pathology. Tomorrow, will consider spot EEG if still not improving.    Pulm: Currently with low vent requirements. Most recent CXR shows likely small pleff left base with adjacent opacity. Will watch.    CV: Profound septic shock. Lactate peaked at 16. Now continuing to decrease slowly to 3.5. Continuing to decrease pressors. NE .1 and vaso straight rate 2.4. On steroids for refractory shock.    GI: Will start tube feeds today given decreased pressor requirements and lactate. Albumin 3.3. SBP with E coli growth from fluid. Given albumin and antibiotics.    Renal: Anuric. CRRT. Pulling fluid while watching lactate and pressor requirements. Bicarb stopped 2/2 metabolic alkalosis.    Endo: Dextrose  drip stopped today. Will monitor glucose. On [place] for thyroid dysfunction.    ID: E. Coli bacteremia. E coli in ascites. Pansensitive but given patient's severely ill state will continue with Zosyn. MRSA swab negative. Vanc stopped.    Heme: Platelets low, INR high, fibrinogen low. Likely 2/2 sepsis. Smear without schistocytes. Fibrinogen responded to cryo. WBC decreased to 20 from 60. Does have CLL but not treated. C/w her overwhelming sepsis state. Will give platelets for under [id number] and cryo for fibrinogen under 100 and FFP for increased INR. Heme consulted for any additional thoughts.    PPx: VTE, GI, VAE    I spoke at length with her  and son today who were agreeable to plan of care and expressed appreciation of our team's care.    65 min of critical care time spent by me in caring for this patient exclusive of procedures.    Critical Care Problems:  Acute respiratory failure  Refractory septic shock  Ecoli bacteremia  Lactic acidosis - profound  DANDRE necessitating CRRT

## 2023-05-26 NOTE — CONSULTS
CLINICAL NUTRITION SERVICES - ASSESSMENT NOTE     Nutrition Prescription      Malnutrition Status:    % Intake: Decreased intake does not meet criteria  % Weight Loss: None noted  Subcutaneous Fat Loss: Unable to assess- Pt down at CT at time of visit  Muscle Loss: Unable to assess  Fluid Accumulation/Edema: Severe  Malnutrition Diagnosis: Unable to determine due to need for NFPE    Recommendations already ordered by Registered Dietitian (RD):  Enteral Nutrition - Initiate via OG tube - Vital HP @ 10 ml/hr (240 ml) provides 240 kcal, 21 g protein, 27 g CHO, 0 g fiber, 201 ml free H2O    Hold at trickle rate for now due to pressor needs, assess ability to advance daily with providers.    Order Nephronex to meet micronutrient needs    Future/Additional Recommendations:  Monitor hemodynamic stability--increase TF as able pending ongoing discussions with providers, monitor labs and tolerance of TF    *Anticipate eventual goal of Vital High Protein @ goal of  45ml/hr  (1080ml/day)  will provide: 1080 kcals, 94 g PRO, 902 ml free H20, 119 g CHO, and 0 g fiber daily.    + Prosource TF 20 x 3 pkts/day= 240 kcal/60 g protein  Total energy/protein provisions will be 1320 kcal (~1 kcal/kg)/154 g protein (~2.4 g/kg) per DW 64 kg     REASON FOR ASSESSMENT  Teresa LEO De Santiago is a/an 73 year old female assessed by the dietitian for Provider Order - Registered Dietitian to Assess and Order TF per Medical Nutrition Therapy Protocol    Pt admitted d/t septic shock due to E. Coli bacteremia, severe lactic acidosis, SBP, multi-organ failure. PMH significant for decompensated cirrhosis due to PBC, CLL in observation (IGHV mutated, normal TP53), solitary kidney.    Pt is intubated and sedated in ICU, CRRT initiated 5/24.     NUTRITION HISTORY  - Information obtained from patient's  and son. Patient is very conscientious with her diet. She is aware of her protein needs, monitors salt, and generally eats a very balanced and healthy  diet. Her weight is variable d/t fluid and her  thinks her usual body weight, when she doesn't have fluid, is in the upper 120s to 130s.  - Allergies: NKFA    CURRENT NUTRITION ORDERS  Diet: None    LABS    Electrolytes  Sodium (mmol/L)   Date Value   05/26/2023 137   05/25/2023 135 (L)   05/25/2023 136   12/12/2016 145 (H)   06/20/2014 141     Potassium (mmol/L)   Date Value   05/26/2023 4.0   05/25/2023 3.7   05/25/2023 3.2 (L)   12/12/2016 4.2   06/20/2014 4.2      Blood Glucose  Glucose (mg/dL)   Date Value   05/26/2023 79   05/25/2023 79   05/25/2023 94   12/12/2016 83   06/20/2014 83     GLUCOSE BY METER POCT (mg/dL)   Date Value   05/26/2023 107 (H)   05/26/2023 155 (H)   05/26/2023 67 (L)   05/26/2023 74   05/25/2023 80        Inflammatory Markers  WBC (10e9/L)   Date Value   12/12/2016 3.3 (L)   11/25/2010 2.6 (L)     WBC Count (10e3/uL)   Date Value   05/26/2023 24.8 (H)   05/25/2023 22.1 (H)   05/25/2023 20.0 (H)     Albumin (g/dL)   Date Value   05/26/2023 3.2 (L)   05/25/2023 3.2 (L)   05/25/2023 3.2 (L)   12/12/2016 3.4   06/20/2014 3.3          Phosphorus (mg/dL)   Date Value   05/26/2023 3.7   05/25/2023 3.4   05/25/2023 2.4 (L)   05/25/2023 3.0   05/24/2023 3.1      Renal  Urea Nitrogen (mg/dL)   Date Value   05/26/2023 16.9   05/25/2023 16.4   05/25/2023 17.7   12/12/2016 16   06/20/2014 24     Creatinine (mg/dL)   Date Value   05/26/2023 1.10 (H)   05/25/2023 1.13 (H)   05/25/2023 1.21 (H)   12/12/2016 0.68   06/20/2014 0.68         Additional  Ketones Urine (mg/dL)   Date Value   05/22/2023 Trace (A)          Bilirubin Total (mg/dL)   Date Value   05/26/2023 7.0 (H)   05/25/2023 6.2 (H)   05/24/2023 5.9 (H)   12/12/2016 1.0   06/20/2014 0.9    Alkaline Phosphatase (U/L)   Date Value   05/26/2023 137 (H)   05/25/2023 109 (H)   05/24/2023 100   12/12/2016 107   06/20/2014 118    AST (U/L)   Date Value   05/26/2023 60 (H)   05/25/2023 49 (H)   05/24/2023 45 (H)   12/12/2016 31   06/20/2014 37  "   ALT (U/L)   Date Value   05/26/2023 36 (H)   05/25/2023 31   05/24/2023 30   12/12/2016 23   06/20/2014 26        Lactic Acid (mmol/L)   Date Value   05/26/2023 3.7 (H)   05/26/2023 4.6 (HH)   05/25/2023 6.2 (HH)     MEDICATIONS  - cortef  - pantoprazole    dextrose 10% 10 mL/hr at 05/26/23 0700     CRRT replacement solution       EPINEPHrine Stopped (05/24/23 1354)     fentaNYL 25 mcg/hr (05/26/23 0700)     midazolam Stopped (05/24/23 1153)     - MEDICATION INSTRUCTIONS -       norepinephrine 0.08 mcg/kg/min (05/26/23 0700)     CRRT replacement solution 200 mL/hr (05/25/23 2215)     CRRT replacement solution       vasopressin 2.4 Units/hr (05/26/23 0700)        ANTHROPOMETRICS  Height: 154.9 cm (5' 1\")  Most Recent Weight: 87.8 kg (193 lb 9 oz)    Vitals:    05/23/23 0400 05/24/23 0615 05/25/23 0600 05/26/23 0400   Weight: 78.1 kg (172 lb 2.9 oz) 85.4 kg (188 lb 4.4 oz) 88.9 kg (195 lb 15.8 oz) 87.8 kg (193 lb 9 oz)     IBW: 47.7 kg  Body mass index is 36.57 kg/m .  BMI: Overweight--BMI at dry weight taken on 5/18 is 29  Weight History:   Wt Readings from Last 20 Encounters:   05/26/23 87.8 kg (193 lb 9 oz)   05/21/23 72.1 kg (159 lb)   05/16/23 68 kg (150 lb)   12/12/16 58.5 kg (129 lb)   06/24/14 57.3 kg (126 lb 4.8 oz)   03/27/14 58.5 kg (129 lb)     Care Everywhere:  05/18/23 70.1 kg (154 lb 8.7 oz)-- pt had US which showed \"sliver\" of ascites  12/20/22 64 kg (141 lb 1.5 oz)  11/30/22 68.7 kg (151 lb 7.3 oz)--significant peripheral edema noted    ASSESSED NUTRITION NEEDS PER APPROVED PRACTICE GUIDELINES  Dosing Weight: 64 kg (most recent true dry weight?)  Estimated Energy Needs: 2765-8366 kcals/day (20 - 25 kcals/kg)  Justification: Vented  Estimated Protein Needs: 128-160+ grams protein/day (2-2.5+ grams of pro/kg)  Justification: Hypercatabolism with critical illness, CRRT  Estimated Fluid Needs: Per provider pending fluid status    PHYSICAL FINDINGS  See malnutrition section above.    MALNUTRITION  As " noted above    NUTRITION DIAGNOSIS  Inadequate oral intake related to respiratory needs requiring NPO as evidenced by need for nutrition support      INTERVENTIONS  Implementation  Nutrition Education: Introduced self and role to patient's  and son, discussed PO PTA and plan to initiate enteral nutrition with initial goal to provide trophic feeds followed by gradual increase to meet patient's needs.     Enteral Nutrition - Initiate as noted above    Collaboration and Referral of Nutrition care: patient discussed during IDT rounds this morning    Goals  Total avg nutritional intake to meet a minimum of 20 kcal/kg and 2 g PRO/kg daily (per dosing wt 64 kg) within 5-7 days or as able pending hemodynamic stability     Monitoring/Evaluation  Progress toward goals will be monitored and evaluated per protocol.  Stephie Marquez, LEILANI, LD, [facility]  Pager - 3rd floor/ICU: [phone]  Pager - All other floors: [phone]  Pager - Weekend/holiday: [phone]  Office: [phone]

## 2023-05-27 NOTE — PLAN OF CARE
Goal Outcome Evaluation:  Cont off all sedation. Grimaces to turns and mouth cares. Does not open eyes or follow commands.EEG done. Awaiting results.    Full vent support. Wean as able. Lungs cl and dim More secretions suctioned via ETT. Junior.    OG with TF. Slowly increasing rate as demetrio.    Cuevas with min tea colored UOP. CRRT with goal 0-100ml/hr. Wean pressors as able.    4+ anasarca. Weeping through line sites and mult sites across body. Skin fragile.

## 2023-05-27 NOTE — PROGRESS NOTES
[facility]    Medicine Progress Note - Hospitalist Service    Date of Admission:  5/22/2023    Assessment & Plan   Teresa De Santiago is a 73 year old woman w/PMH PBC diagnosed approximately 10 years ago, esophageal varices, chronic anemia/thrombocytopenia, solitary kidney following very remote right nephrectomy for a nonmalignant mass, CLL who presented after having blood cultures returned positive for E. coli after visiting ED on 5/21 after she presented with malaise and abdominal pain that have been progressing over the several preceding days.     In the emergency department, Ms. De Santiago was fluid resuscitated with 2 L of crystalloid.  She was also incidentally found to be significantly hypoglycemic (glucose fell to 20 mg/dL) at which time she was given an amp of D50.  Due to the hypotension, the patient was started on norepinephrine with brisk response.  Had paracentesis w/ascitic fluid was cloudy and yellow.     Had acute worsening after admission requiring 3 pressors and CRRT. Has shown some evidence of improvement and epinephrine stopped 5/24. Has required multiple transfusions of blood products including cryo, FFP, PRBC's and PLT's for DIC.     Clinically slowly improving but holding sedation to help evaluate neurologic function. Requiring less pressor support.     Septic shock, persistent  E. coli SBP and bacteremia  -as above presented to ED with abd pain and paracentesis + e coli with blood cultures also positive.  -started on Vanc and Zosyn emperically but continued clinical deterioration despite aggressive care. ECHO with preserved EF  -MRSA negative so stop Vanc 5/24, remains on Zosyn  -currently on levophed (dose reduced) and vasopressin drip. Epinephrine infusion stopped 5/24.  -solucortef 50q6 for adrenal support  -palliative care following due to very poor prognosis, currently family still wants restorative care and remains full code  -repeat blood cx 5/24 no growth    Lactic  acidosis  -likely multifactorial from sepsis, renal failure  -markedly improved, mildly elevated now    DANDRE on CKD stage 2 requiring CRRT  Hx solitary kidney  AGMA  -Baseline creatinine appears to be 1.1-1.2 over the last year.  Admission creatinine is 2.2  -Cr is improved with CRRT, remains anuric but small amount of urine productioni now and able to pull some fluid  -nephrology following    Suspected DIC w/anemia, thrombocytopenia, low fibrinogen  Hx chronic anemia, thrombocytopenia  -likely due to underlying sepsis but persistent and worsening despite PRBC's x1 on 5/24 and 25; cryoprecipitate on 5/23,24, 26; PLT's 5/24, 26  -coags and Hgb improved but monitor and transfuse as needed    Toxic and metabolic encephalopathy  -initially due to infection, metabolic derangements and then sedated on vent  -sedatives now held, CT head negative acute findings  -EED ordered    Hypoglycemia  -fluids adjusted, nephrology following    Hx CLL, Primary biliary cirrhosis  -Patient is under the care of the [facility] [facility] hepatology service but is not being offered a transplant. Follows with [facility] oncology as well, handy CLL in surveilance     Hypokalemia  -replete     Diet: Adult Formula Drip Feeding: Continuous Vital High Protein; Orogastric tube; Goal Rate: 45; mL/hr; Begin advancement by 10 mL q 8 hrs to goal rate as tolerated; Do not advance tube feeding rate unless K+ is = or > 3.0, Mg++ is = or > 1.5, and Phos ...    DVT Prophylaxis: Pneumatic Compression Devices  Cuevas Catheter: PRESENT, indication: Strict 1-2 Hour I&O  Lines: PRESENT      CVC Double Lumen Right Internal jugular-Site Assessment: WDL except;Edematous  Arterial Line 05/22/23 Femoral-Site Assessment: WDL Except;Edematous;Leaking  Hemodialysis Vascular Access Femoral vein catheter Left Femoral vein-Site Assessment: WDL except;Edematous;Leaking      Cardiac Monitoring: ACTIVE order. Indication: ICU  Code Status: Full Code      Beka Johnson DO  Hospitalist  Service  [facility]  Securely message with [employer] (more info)  Text page via [employer] Paging/Directory   ______________________________________________________________________    Interval History   Remains on levophed and vasopressin but lactic has improved and nephrology has been able to pull some volume now but remains grossly overloaded. CT head was negative, appears to be showing some very minimal neurologic improvement with grimacing per nursing    Physical Exam   Vital Signs: Temp: 97.5  F (36.4  C) Temp src: Temporal BP: 113/63 Pulse: 76   Resp: 15 SpO2: 95 % O2 Device: Mechanical Ventilator    Weight: 192 lbs 10.91 oz  Constitutional: obtunded, not moving extremeties  Eyes: constricted but reactive pupils bilaterally  ENT: ETT in place  Respiratory: ventilatory breath sounds, no wheezing  Cardiovascular: regular rate and rhythm, no murmur  GI: muffled bowel sounds, distended, ascites becoming more tense  Genitounirinary: guerra in place  Skin: bruising on extremeties  Neurologic: obtunded, not moving extremeties or following commands, faint upgoing babinski on L    60 MINUTES SPENT BY ME on the date of service doing chart review, history, exam, documentation & further activities per the note.      Data   ------------------------- PAST 24 HR DATA REVIEWED -----------------------------------------------    I have personally reviewed the following data over the past 24 hrs:    27.4 (H)  \   8.7 (L)   / 16 (LL)     136 101 17.3 /  104 (H)   4.3 23 0.93 \       ALT: 39 (H) AST: 69 (H) AP: 155 (H) TBILI: 7.7 (H)   ALB: 2.8 (L) TOT PROTEIN: 4.4 (L) LIPASE: N/A       Procal: N/A CRP: N/A Lactic Acid: 3.1 (H)       INR:  2.65 (H) PTT:  53 (H)   D-dimer:  N/A Fibrinogen:  106 (L)       Imaging results reviewed over the past 24 hrs:   No results found for this or any previous visit (from the past 24 hour(s)).

## 2023-05-27 NOTE — PROCEDURES
TECHNICAL SUMMARY: EEG was recorded from 21 scalp electrodes placed according to the 10-20 international system. Additional electrodes were used for referencing, EKG, and to record from other cerebral regions as appropriate.      BACKGROUND ACTIVITY: The patient was drowsy throughout the recording.  There was primarily diffuse theta delta slowing and at times slowing to delta ranges.  No focal slowing was present.  There were triphasic waves.      ACTIVATION PROCEDURE: Photic stimulation did not induce an abnormal activity in the EEG.     INTERICTAL EPILEPTIFORM DISCHARGES: No epileptiform discharges were present.     ICTAL: No clinical events or electrographic seizures were recorded.      IMPRESSION OF  EEG:  This is an abnormal video electroencephalogram during drowsiness due to diffuse theta delta slowing and presence of triphasic waves, consistent with  diffuse nonspecific encephalopathy. Triphasic waves non specific but can be been with hepatic encephalopathy but also with metabolic changes and structural changes.    No electrographic seizures or epileptiform discharges were recorded. Clinical correlation is advised.

## 2023-05-27 NOTE — PROGRESS NOTES
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       1)Anuric DANDRE   ATN   In setting of hypotension, sepsis, E coli bacteremia, peritonitis, and cirrhosis. Ischemic ATN. Started CRRT 5/23 though had lengthy discussion with patient's , given continued high lactate and high pressor needs, overall prognosis remains poor. Will provide time-limited trial of dialysis, but would not recommend chronic dialysis if indicated.   CRRT started 5/23                  - L femoral mary                 - pre-filter phoxillum 4K 1000 ml/h                  - dialysate phoxillum 4K 1000 ml/h                  - post-filter phoxillum 4K 200 ml/h                 - goal UF net negative 100 ml/h if able     2) Septic shock due to E coli bacteremia   SBP   Improving, now down to 2 pressors.     3) Severe lactic acidosis, improved   AGMA, resolved  Lactate improved, took awhile.  Developed some alkalosis, thus switched post-filter solution.     4) CKD II   Solitary kidney   History of R nephrectomy   Baseline Cr 1-1.2 in setting of solitary kidney.        5) Acute liver failure  Cirrhosis due to PBC, decompensated   Coagulopathy   Slowly worsening cirrhosis since diagnosis with CLL. Dr. Cool, primary hepatologist has been concerned that CLL may be affecting her liver since liver decompensation correlating with CLL diagnosis. Pt not a liver transplant candidate currently due to age, referrals to other centers have been placed. Previously when patient was a few years younger, liver transplant eval was pursed preemptively, but patient's health was too good at the time.      6) Leukocytosis, improved   CLL   Follows at [facility].      7) Anemia  Thrombocytopenia  Chronic issues for  patient due to liver disease. Worsened due to sepsis and infection, requiring intermittent transfusions.     Plan/Recs:  1) continue CRRT   2) attempting net negative 100 ml/h   3) no changes today     Discussed with bedside RN and ICU team Dr. Orozco and Dr. Jhonson.    Caroline Tirado MD   [facility]  Office: [phone]          Interval History:     - no acute events overnight   - CRRT going well, UF net negative  ml/h, pressors stable   - CT head negative   - small amount of very thick and concentrated urine   - remains anasarcic           Medications and Allergies:       B and C vitamin Complex with folic acid  5 mL Per Feeding Tube Daily     hydrocortisone sodium succinate PF  50 mg Intravenous Q6H     pantoprazole  40 mg Per Feeding Tube QAM AC     piperacillin-tazobactam  4.5 g Intravenous Q6H     [START ON 5/28/2023] protein modular  1 packet Per Feeding Tube TID     sodium chloride (PF)  3 mL Intracatheter Q8H     thyroid  90 mg Oral or Feeding Tube Daily     [Held by provider] ursodiol  500 mg Oral BID        Allergies   Allergen Reactions     Contrast Dye Hives            Physical Exam:   Vitals were reviewed  /63   Pulse 80   Temp 97.5  F (36.4  C) (Temporal)   Resp 18   Wt 87.4 kg (192 lb 10.9 oz)   SpO2 95%   BMI 36.41 kg/m      Wt Readings from Last 3 Encounters:   05/27/23 87.4 kg (192 lb 10.9 oz)   05/21/23 72.1 kg (159 lb)   05/16/23 68 kg (150 lb)       Intake/Output Summary (Last 24 hours) at 5/24/2023 1527  Last data filed at 5/24/2023 1400  Gross per 24 hour   Intake 6436.99 ml   Output 76 ml   Net 6360.99 ml       GENERAL APPEARANCE: NAD, intubated, sedated   HEENT:  ETT in place   RESP: crackles  CV: RRR  ABDOMEN: distended and firm  EXTREMITIES/SKIN: anasarca, 3-4+ edema in all extremities with weeping  NEURO:  sedated  LINES:  + guerra with a couple ml of concentrated urine           Data:     [facility]  Recent Labs   Lab 05/27/23  1233 05/27/23  1214  05/27/23  0916 05/27/23  0839 05/27/23  0420 05/26/23  1944 05/26/23  1733 05/26/23  1202 05/26/23  1139     --   --   --  138  --  137  --  134*   POTASSIUM 4.3  --   --   --  4.3  --  3.9  --  3.9   CHLORIDE 101  --   --   --  103  --  102  --  99   SONYA 7.7*  --   --   --  7.6*  --  7.9*  --  8.2*   CO2 23  --   --   --  23  --  24  --  25   BUN 17.3  --   --   --  17.1  --  16.6  --  17.0   CR 0.93  --   --   --  0.94  --  1.02*  --  1.12*   * 99 130* 62* 89   < > 79   < > 108*    < > = values in this interval not displayed.     CBC  Recent Labs   Lab 05/27/23  0420 05/26/23 2030 05/26/23  1139 05/26/23  0412   WBC 27.4* 19.1* 22.8* 24.8*   HGB 8.7* 8.0* 8.1* 8.0*   HCT 25.4* 23.2* 23.2* 23.6*   MCV 91 90 91 92   PLT 16* 14* 17* 27*     Lab Results   Component Value Date    AST 69 (H) 05/27/2023    ALT 39 (H) 05/27/2023    ALKPHOS 155 (H) 05/27/2023    BILITOTAL 7.7 (H) 05/27/2023    BILICONJ 0.0 06/20/2014    LITZY 46 05/27/2023     Lab Results   Component Value Date    INR 2.65 (H) 05/27/2023       Attestation:  I have reviewed today's vital signs, notes, medications, labs and imaging.    Caroline Tirado MD  [facility] - Nephrology  Office: [phone]

## 2023-05-27 NOTE — PROGRESS NOTES
Teresa De Santiago is a 73 year old female was called back to the ED on 5/22/2023 due to blood cultures turned positive that had been drawn when she first presented with malaise, belly distension, and belly pain on 5/21/23. She was hypotensive and hypoglycemic on presentation to the ER. She underwent paracentesis that eventually has proven to be the source of infection. She was started on broad spectrum abx, norepinephrine, and endotracheally intubated.     Medical history is notable for primary biliary cirrhosis that is currently followed at [facility].  Her hepatologist, Dr. Haley Cool's documentation indicates that she is no longer being considered for liver transplant.  She was diagnosed in March with CLL but is not on any therapy for that.  Remote history is notable for right nephrectomy for benign indication.  Her baseline creatinine is about 1.1.      Neuro: Fentanyl drip stopped two days ago. On no IV sedation drips. Goal RASS 0 to -2, unless this interferes with ventilation/oxygenation/hemodynamics. In that case, we will target a lower RASS. Currently, still sedated but now responsive to voice/name. Opening eyes to name is new today. Most likely midazolam still washing out along with septic encephalopathy. CT yesterday showed no acute intracranial pathology. EEG without seizure activity. C/w encephalopathy. I spoke to Dr. Delgadillo, neuro conslt, who will come today and examine patient. Next step if patient's mental status does not continue to improve is MRI.    Pulm: Currently with low vent requirements. Most recent CXR shows likely small pleff left base with adjacent opacity. Will monitor.     CV: Profound septic shock. Lactate peaked at 16. Now continuing to decrease slowly to 2.8. NE .14 and vaso straight rate 2.4 while pulling fluid on CRRT. On steroids for refractory shock.    GI: Tube feeds titrating to goal. She is tolerating them well. Albumin 3.0. SBP with E coli growth from fluid. Given albumin and  antibiotics. Ammonia 46    Renal: Anuric. CRRT. Pulling fluid while watching lactate and pressor requirements. Bicarb stopped 2/2 metabolic alkalosis. One liter negative yesterday.    Endo: Glucose levels reasonable off drip. On [place] for thyroid dysfunction.    ID: E. Coli bacteremia. E coli in ascites. Pansensitive but given patient's severely ill state will continue with Zosyn. MRSA swab negative. Vanc stopped. Blood culture from 5/24 negative    Heme: Platelets low, INR high, fibrinogen low. Likely 2/2 sepsis. Smear without schistocytes. Fibrinogen responded to cryo. WBC 40 today. Does have CLL but not treated. C/w her overwhelming sepsis state. Will give platelets for under 51989 and cryo for fibrinogen under 100 and FFP for increased INR. Heme consulted for any additional thoughts.    PPx: VTE, GI, VAE    I spoke at length with her  and son today who were agreeable to plan of care and expressed appreciation of our team's care.    65 min of critical care time spent by me in caring for this patient exclusive of procedures.    Critical Care Problems:  Acute respiratory failure  Septic shock  Ecoli bacteremia  Lactic acidosis - profound but now downtrending  DANDRE necessitating CRRT

## 2023-05-27 NOTE — PROGRESS NOTES
"         [facility]  Respiratory Care Note    [facility] ICU VENTILATOR RESPIRATORY NOTE  Date of Admission: 05/22/2023  Date of Intubation (most recent): 05/22/2023  Reason for Mechanical Ventilation: Respiratory Failure  Number of Days on Mechanical Ventilation: 6   Met Criteria for Pressure Support Trial: No  Length of Pressure Support Trial:   Reason for Stopping Pressure Support Trial:   Reason for No Pressure Support Trial: Due to current respiratory status and on pressors.   Significant Events Today:   ABG Results: 7.47/36/65/26 @ 1235 05/27/2023  ETT appearance on chest x-ray: 3.8 cm above Cortney     Plan:  Will continue to monitor and assess the pt's current respiratory status and needs, in hopes of liberating her from the ventilator.     Vent Mode: CMV/AC  (Continuous Mandatory Ventilation/ Assist Control)  FiO2 (%): 45 %  Resp Rate (Set): 15 breaths/min  Tidal Volume (Set, mL): 320 mL  PEEP (cm H2O): 5 cmH2O  Resp: 15    Vital signs:  Temp: 97.5  F (36.4  C) Temp src: Temporal BP: 113/63 Pulse: 58   Resp: 15 SpO2: 97 % O2 Device: Mechanical Ventilator     Weight: 87.4 kg (192 lb 10.9 oz)  Estimated body mass index is 36.41 kg/m  as calculated from the following:    Height as of 5/21/23: 1.549 m (5' 1\").    Weight as of this encounter: 87.4 kg (192 lb 10.9 oz).      Recent Labs   Lab 05/27/23  1235 05/27/23  0557 05/27/23  0024 05/26/23  0602   PH 7.47* 7.53* 7.53* 7.45   PCO2 36 31* 32* 42   PO2 65* 64* 66* 70*   HCO3 26 26 26 29*   O2PER 45 45 45 55     Ge Miles RT  [facility]  5/27/2023       "

## 2023-05-27 NOTE — PROGRESS NOTES
CLINICAL NUTRITION SERVICES - BRIEF NOTE    - Refer to RD assessment completed yesterday.  Chart check for nutrition support patient.  - EN via OGT initiated yesterday at trophic rate (Vital HP at 10 mL/hr).  - Labs from this AM reviewed:    Na, K, magnesium and phosphorus WNL   Lactic acid downtrending   BG trending noted  - Medications reviewed:    Remains on levo and vaso   Off D10 IVFs  - Noted remains on CRRT.  - Discussed POC w/ team during rounds, received approval from MD to begin advancement and ordered the following for initial goal:    - Access: OGT  - Frequency: Continuous  - Vital High Protein at goal rate of 45 mL/hr x 24 hrs  - 1080 mL provides 1080 kcal, 94 g protein, 120 g CHO, 0 g fiber, and 907 mL free water daily  - Meets <100% RDIs --> already on Nephronex  - Plan for addition of protein modulars starting 5/28: 1 pkt Prosource TID to total 1320 kcal (21 kcal/kg) and 154 g protein (2.4 g protein/kg)  - Free water flush: 30 mL q 4 hrs   5/27: Begin advancement by 10 mL q 8 hrs to goal rate as tolerated    ASSESSED NUTRITION NEEDS PER APPROVED PRACTICE GUIDELINES  Dosing Weight: 64 kg (most recent true dry weight?)  Estimated Energy Needs: 7852-5700 kcals/day (20 - 25 kcals/kg)  Justification: Vented  Estimated Protein Needs: 128-160+ grams protein/day (2-2.5+ grams of pro/kg)  Justification: Hypercatabolism with critical illness, CRRT  Estimated Fluid Needs: Per provider pending fluid status    - Will continue following, please page as needed.    Kimi Hopper RDN, LD  Clinical Dietitian  3rd floor/ICU: [phone]  All other floors: [phone]  Weekend/holiday: [phone]  Office: [phone]

## 2023-05-27 NOTE — PROGRESS NOTES
[facility] ICU VENTILATOR RESPIRATORY NOTE    Date of Admission: 05/22/2023    Date of Intubation (most recent): 05/22/2023    Reason for Mechanical Ventilation: Respiratory Failure    Number of Days on Mechanical Ventilation: 6    Met Criteria for Pressure Support Trial: No    Reason for No Pressure Support Trial:Unstable/unsafe airway    Significant Events Today:   ABG Results:   Venous Blood Gas  Recent Labs   Lab 05/27/23  0024 05/26/23  0602 05/26/23  0000 05/25/23  1816   PH 7.53* 7.45 7.48* 7.48*   PCO2 32* 42 40 40   PO2 66* 70* 64* 57*   HCO3 26 29* 30* 30*   O2PER 45 55 50 45       ETT appearance on chest x-ray: 3.8 cm above Cortney    Vent Mode: CMV/AC  (Continuous Mandatory Ventilation/ Assist Control)  FiO2 (%): 40 %  Resp Rate (Set): 18 breaths/min  Tidal Volume (Set, mL): 370 mL  PEEP (cm H2O): 5 cmH2O  Resp: 17    Bs coarse. Suctioned for small thick secretions. Will continue to assess and monitor.    RT Trini on 5/27/2023 at 5:26 AM

## 2023-05-28 NOTE — PLAN OF CARE
"CRRT STATUS NOTE          Pressures WNL:  NO  Filter Status:  WDL; Clogging alarm this AM, see below    Problems Reported/Alarms Noted: \"Filter is glogging\" alarm; fluid removal rate decreased and alarm resolved with TMP decreasing. Day RN aware and will monitor filter clogging and TMP.      ASSESSMENT:  Patient Net Fluid Balance:  Weight down 1.9kg in last 24 hours     Urine status: minimal urine output   Vital Signs: Temp:  [96.2  F (35.7  C)-97.5  F (36.4  C)] 97.1  F (36.2  C)  Pulse:  [53-80] 70  Resp:  [14-21] 21  MAP:  [60 mmHg-94 mmHg] 69 mmHg  Arterial Line BP: ()/(28-64) 108/48  FiO2 (%):  [45 %-50 %] 50 %  SpO2:  [93 %-99 %] 95 %       Temperature managed with   Blood warmer YES  Forced warm air YES  Blankets YES    Vasoactive gtts: Levophed and vasopressin        Goals of Therapy: Net negative 100mL/hr if hemodynamics allow.        INTERVENTIONS/Significant Events:   (lines reversed, electrolyte replacement, other): Calcium gluconate replaced per orders    PLAN/Meeting Goal:  Continue with CRRT per plan of care. Fluid removal per plan of care. Due for filter change (q 72hrs)  Due at 5/29/23 at 1115 or sooner if indicated.                      Goal Outcome Evaluation:      Plan of Care Reviewed With: patient    Overall Patient Progress: decliningOverall Patient Progress: declining           "

## 2023-05-28 NOTE — PROGRESS NOTES
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       1)Anuric DANDRE   ATN   In setting of hypotension, sepsis, E coli bacteremia, peritonitis, and cirrhosis. Ischemic ATN. Started CRRT 5/23 though had lengthy discussion with patient's , given continued high lactate and high pressor needs, overall prognosis remains poor. Will provide time-limited trial of dialysis, but would not recommend chronic dialysis if indicated.   CRRT started 5/23                  - L femoral mary                 - pre-filter phoxillum 4K 1000 ml/h                  - dialysate phoxillum 4K 1000 ml/h                  - post-filter phoxillum 4K 200 ml/h                 - goal UF net negative 100 ml/h if able     2) Septic shock due to E coli bacteremia   SBP   Improving, now down to 2 pressors.     3) Severe lactic acidosis, improved   AGMA, resolved  Lactate improved, took awhile.  Developed some alkalosis, thus switched post-filter solution.     4) CKD II   Solitary kidney   History of R nephrectomy   Baseline Cr 1-1.2 in setting of solitary kidney.        5) Acute liver failure  Cirrhosis due to PBC, decompensated   Coagulopathy   Slowly worsening cirrhosis since diagnosis with CLL. Dr. Cool, primary hepatologist has been concerned that CLL may be affecting her liver since liver decompensation correlating with CLL diagnosis. Pt not a liver transplant candidate currently due to age, referrals to other centers have been placed. Previously when patient was a few years younger, liver transplant eval was pursed preemptively, but patient's health was too good at the time.      6) Leukocytosis, improved   CLL   Follows at [facility].      7) Anemia  Thrombocytopenia  Chronic issues for  patient due to liver disease. Worsened due to sepsis and infection, requiring intermittent transfusions.     Plan/Recs:  1) continue CRRT   2) attempting net negative 100 ml/h   3) no changes today     Discussed with bedside RN.     Caroline Tirado MD   [facility]  Office: [phone]          Interval History:     - received multiple blood products due to low counts, thus slightly net positive this AM   - still attempting net negative 50 -100 ml/h and currently achievable. Down 2 kg in weight today   - stable vent settings   - stable pressor requirements   - some UOP, though minimal, appears to be clearing up somewhat, less opaque   - slowly waking up somewhat. Grimacing to pain. EEG with non-specific encephalopathy          Medications and Allergies:       B and C vitamin Complex with folic acid  5 mL Per Feeding Tube Daily     hydrocortisone sodium succinate PF  50 mg Intravenous Q6H     pantoprazole  40 mg Per Feeding Tube QAM AC     piperacillin-tazobactam  4.5 g Intravenous Q6H     protein modular  1 packet Per Feeding Tube TID     sodium chloride (PF)  3 mL Intracatheter Q8H     thyroid  90 mg Oral or Feeding Tube Daily     [Held by provider] ursodiol  500 mg Oral BID        Allergies   Allergen Reactions     Contrast Dye Hives            Physical Exam:   Vitals were reviewed  /63   Pulse 56   Temp 97.2  F (36.2  C) (Temporal)   Resp 15   Wt 85.5 kg (188 lb 7.9 oz)   SpO2 94%   BMI 35.62 kg/m      Wt Readings from Last 3 Encounters:   05/28/23 85.5 kg (188 lb 7.9 oz)   05/21/23 72.1 kg (159 lb)   05/16/23 68 kg (150 lb)       Intake/Output Summary (Last 24 hours) at 5/24/2023 1527  Last data filed at 5/24/2023 1400  Gross per 24 hour   Intake 6436.99 ml   Output 76 ml   Net 6360.99 ml       GENERAL APPEARANCE: NAD, intubated, sedated   HEENT:  ETT in place   RESP: crackles  CV: RRR  ABDOMEN: distended and firm  EXTREMITIES/SKIN: anasarca, 3-4+ edema in all extremities with  weeping  NEURO:  Sedated, grimaces to pain   LINES:  + guerra with a couple ml of concentrated urine           Data:     BMP  Recent Labs   Lab 05/28/23  0822 05/28/23  0450 05/28/23  0340 05/28/23  0100 05/27/23 2010 05/27/23  1604 05/27/23  1233 05/27/23  0839 05/27/23  0420   NA  --  135*  --   --  136  --  136  --  138   POTASSIUM  --  4.7  --   --  4.8  --  4.3  --  4.3   CHLORIDE  --  101  --   --  103  --  101  --  103   SONYA  --  8.0*  --   --  7.7*  --  7.7*  --  7.6*   CO2  --  23  --   --  23  --  23  --  23   BUN  --  18.0  --   --  17.8  --  17.3  --  17.1   CR  --  0.94  --   --  0.92  --  0.93  --  0.94   * 116* 106* 97 103*   < > 104*   < > 89    < > = values in this interval not displayed.     CBC  Recent Labs   Lab 05/28/23  0450 05/27/23 2010 05/27/23  0420 05/26/23 2030   WBC 43.4* 48.3* 27.4* 19.1*   HGB 8.5* 9.6* 8.7* 8.0*   HCT 25.6* 28.4* 25.4* 23.2*   MCV 95 94 91 90   PLT 60* 23* 16* 14*     Lab Results   Component Value Date    AST 71 (H) 05/28/2023    ALT 40 (H) 05/28/2023    ALKPHOS 181 (H) 05/28/2023    BILITOTAL 9.8 (H) 05/28/2023    BILICONJ 0.0 06/20/2014    LITZY 46 05/27/2023     Lab Results   Component Value Date    INR 1.75 (H) 05/28/2023       Attestation:  I have reviewed today's vital signs, notes, medications, labs and imaging.    Caroline Tirado MD  [facility] - Nephrology  Office: [phone]

## 2023-05-28 NOTE — PROGRESS NOTES
[facility] ICU VENTILATOR RESPIRATORY NOTE     Date of Admission: 05/22/2023     Date of Intubation (most recent): 05/22/2023     Reason for Mechanical Ventilation: Respiratory Failure     Number of Days on Mechanical Ventilation: 7     Met Criteria for Pressure Support Trial: No     Reason for No Pressure Support Trial:Unstable/unsafe airway     Significant Events Today:   ABG Results:   Recent Labs   Lab 05/27/23 2010 05/27/23  1235 05/27/23  0557 05/27/23  0024   PH 7.45 7.47* 7.53* 7.53*   PCO2 37 36 31* 32*   PO2 62* 65* 64* 66*   HCO3 26 26 26 26   O2PER 45 45 45 45     ETT appearance on chest x-ray: 3.8 cm above Cortney    Vent Mode: CMV/AC  (Continuous Mandatory Ventilation/ Assist Control)  FiO2 (%): 50 %  Resp Rate (Set): 15 breaths/min  Tidal Volume (Set, mL): 320 mL  PEEP (cm H2O): 5 cmH2O  Resp: 18    Bs diminished. Will continue to assess and monitor.    RT Trini on 5/28/2023 at 5:06 AM

## 2023-05-28 NOTE — PLAN OF CARE
Goal Outcome Evaluation:  Slightly more response turns head to voice opened eyes briefly to command. Grimaces and weak cough with suctioning.     Full vent support. Min secretions via ETT.     Gen anasarca with lrg amt of weeping skin and through line sites. Freq blue pad changes.    Cuevas with scant UOP. CRRT Goal 0-100 with difficulty r/t BP demetrio. Down 2Kg today.     OG with TF at goal.Started on stool softners. LRG loose BM.     son at bedside. Given updates/ emotional support.

## 2023-05-28 NOTE — PROGRESS NOTES
TELE ICU    Patient with progressive DIC and all blood indices are worse despite appropriate antibiotic coverage.  To avoid IHC will provide patient with cryo (10) , FP (2) , and platelet (1) support overnight.  Will continue to follow the patient's DIC parameters.    Deshaun Mcknight MD, PhD

## 2023-05-28 NOTE — PROGRESS NOTES
"         [facility]  Respiratory Care Note    [facility] ICU VENTILATOR RESPIRATORY NOTE  Date of Admission: 05/22/2023  Date of Intubation (most recent): 05/22/2023  Reason for Mechanical Ventilation: Respiratory Failure  Number of Days on Mechanical Ventilation: 7   Met Criteria for Pressure Support Trial: No  Length of Pressure Support Trial:   Reason for Stopping Pressure Support Trial:   Reason for No Pressure Support Trial: Due to current respiratory status and on pressors.   Significant Events Today: suctioning out a scant amount of cloudy secretions.   ABG Results: 7.44/37/70/25 @ 0633 05/28/2023  ETT appearance on chest x-ray: 3.8 cm above Cortney     Plan:  Will continue to monitor and assess the pt's current respiratory status and needs, in hopes of liberating her from the ventilator.     Vent Mode: CMV/AC  (Continuous Mandatory Ventilation/ Assist Control)  FiO2 (%): 50 %  Resp Rate (Set): 15 breaths/min  Tidal Volume (Set, mL): 320 mL  PEEP (cm H2O): 5 cmH2O  Resp: 15    Vital signs:  Temp: 97.9  F (36.6  C) Temp src: Temporal   Pulse: 57   Resp: 15 SpO2: 92 % O2 Device: Mechanical Ventilator     Weight: 85.5 kg (188 lb 7.9 oz)  Estimated body mass index is 35.62 kg/m  as calculated from the following:    Height as of 5/21/23: 1.549 m (5' 1\").    Weight as of this encounter: 85.5 kg (188 lb 7.9 oz).        Recent Labs   Lab 05/28/23  0633 05/27/23 2010 05/27/23  1235 05/27/23  0557   PH 7.44 7.45 7.47* 7.53*   PCO2 37 37 36 31*   PO2 70* 62* 65* 64*   HCO3 25 26 26 26   O2PER 50 45 45 45     .Ge Miles RT  [facility]  5/28/2023       "

## 2023-05-28 NOTE — PROGRESS NOTES
[facility]    Medicine Progress Note - Hospitalist Service    Date of Admission:  5/22/2023    Assessment & Plan   Teresa De Santiago is a 73 year old woman w/PMH PBC diagnosed approximately 10 years ago, esophageal varices, chronic anemia/thrombocytopenia, solitary kidney following very remote right nephrectomy for a nonmalignant mass, CLL who presented after having blood cultures returned positive for E. coli after visiting ED on 5/21 after she presented with malaise and abdominal pain that have been progressing over the several preceding days.     In the emergency department, Ms. De Santiago was fluid resuscitated with 2 L of crystalloid.  She was also incidentally found to be significantly hypoglycemic (glucose fell to 20 mg/dL) at which time she was given an amp of D50.  Due to the hypotension, the patient was started on norepinephrine with brisk response.  Had paracentesis w/ascitic fluid was cloudy and yellow.     Had acute worsening after admission requiring 3 pressors and CRRT. Has shown some evidence of improvement and epinephrine stopped 5/24. Has required multiple transfusions of blood products including cryo, FFP, PRBC's and PLT's for DIC.     Remains encephalopathic, CT head ok, EEG with general encephalopathic findings. Remains off sedation but neurologically not much change.      Toxic and metabolic encephalopathy  -initially due to infection, metabolic derangements and then sedated on vent  -CT head negative acute findings  -EEG without evidence of seizures, general encephalopathic process noted  -pupils remain pinpoint, still very encephalopathic. Suspect due to poor perfusion will take extended period for sedatives to wash out but will consult neurology for further recommendations and help with prognostication.    Septic shock, persistent  E. coli SBP and bacteremia  -as above presented to ED with abd pain and paracentesis + e coli with blood cultures also positive.  -started on Vanc  and Zosyn emperically but continued clinical deterioration despite aggressive care. ECHO with preserved EF  -MRSA negative so stop Vanc 5/24, remains on Zosyn  -currently on levophed (dose reduced) and vasopressin drip. Epinephrine infusion stopped 5/24.  -solucortef 50q6 for adrenal support  -palliative care following due to very poor prognosis, currently family still wants restorative care and remains full code  -repeat blood cx 5/24 no growth    Lactic acidosis  -likely multifactorial from sepsis, renal failure  -markedly improved, mildly elevated now    DANDRE on CKD stage 2 requiring CRRT  Hx solitary kidney  AGMA  -Baseline creatinine appears to be 1.1-1.2 over the last year.  Admission creatinine is 2.2  -Cr is improved with CRRT, remains anuric but small amount of urine productioni now and able to pull some fluid  -nephrology following    Suspected DIC w/anemia, thrombocytopenia, low fibrinogen  Hx chronic anemia, thrombocytopenia  -likely due to underlying sepsis but persistent and worsening despite PRBC's x1 on 5/24 and 25; cryoprecipitate on 5/23,24, 26, 28; PLT's 5/24, 26, 28; FFP 28  -coags and Hgb improved but monitor and transfuse as needed    Hypoglycemia  -fluids adjusted, nephrology following    Hx CLL, Primary biliary cirrhosis  -Patient is under the care of the [place] hepatology service but is not being offered a transplant. Follows with [place] oncology as well, handy CLL in surveilance     Hypokalemia  -replete     Diet: Adult Formula Drip Feeding: Continuous Vital High Protein; Orogastric tube; Goal Rate: 45; mL/hr; Begin advancement by 10 mL q 8 hrs to goal rate as tolerated; Do not advance tube feeding rate unless K+ is = or > 3.0, Mg++ is = or > 1.5, and Phos ...    DVT Prophylaxis: Pneumatic Compression Devices  Cuevas Catheter: PRESENT, indication: Strict 1-2 Hour I&O  Lines: PRESENT      CVC Double Lumen Right Internal jugular-Site Assessment: WDL except;Edematous  Arterial Line 05/22/23  Femoral-Site Assessment: WDL Except;Edematous;Leaking  Hemodialysis Vascular Access Femoral vein catheter Left Femoral vein-Site Assessment: WDL except;Edematous;Leaking      Cardiac Monitoring: ACTIVE order. Indication: ICU  Code Status: Full Code       Beka Johnson DO  Hospitalist Service  [facility]  Securely message with [employer] (more info)  Text page via [employer] Paging/Directory   ______________________________________________________________________    Interval History   Remains obtunded and off sedation. Remains on pressors and intubated. Received additional blood products overnight    Physical Exam   Vital Signs: Temp: 97.2  F (36.2  C) Temp src: Temporal   Pulse: 55   Resp: 10 SpO2: 93 % O2 Device: Mechanical Ventilator    Weight: 188 lbs 7.89 oz  Constitutional: obtunded, not moving extremeties  Eyes: very constricted but reactive pupils bilaterally  ENT: ETT in place  Respiratory: ventilatory breath sounds, no wheezing  Cardiovascular: regular rate and rhythm, no murmur  GI: muffled bowel sounds, distended, ascites worsening but not tense  Genitounirinary: guerra in place  Skin: bruising on extremeties  Neurologic: obtunded, not moving extremeties or following commands, faint upgoing babinski     60 MINUTES SPENT BY ME on the date of service doing chart review, history, exam, documentation & further activities per the note.      Data   ------------------------- PAST 24 HR DATA REVIEWED -----------------------------------------------    I have personally reviewed the following data over the past 24 hrs:    43.4 (H)  \   8.5 (L)   / 60 (L)     135 (L) 101 18.0 /  120 (H)   4.7 23 0.94 \       ALT: 40 (H) AST: 71 (H) AP: 181 (H) TBILI: 9.8 (H)   ALB: 3.0 (L) TOT PROTEIN: 4.8 (L) LIPASE: N/A       Procal: N/A CRP: N/A Lactic Acid: 2.8 (H)       INR:  1.75 (H) PTT:  41 (H)   D-dimer:  N/A Fibrinogen:  146 (L)       Imaging results reviewed over the past 24 hrs:   No results found for  this or any previous visit (from the past 24 hour(s)).

## 2023-05-28 NOTE — PROGRESS NOTES
ICU End of Shift Summary.  For vital signs and complete assessments, please see documentation flowsheets.      Pertinent assessments:   Neuro: Not responding to voice or following commands, does wince in pain and clamp mouth down when doing oral cares. Afebrile; tmax 97.1, bear hugger on and blood warmer on blood return with CRRT  Cardiac: Tele SR; HR 50-60s.   Resp: Remains intubated, LS dim, minimal secretions; Resp rate 15 on vent, breathing over vent at 15-19 breaths per minute  GI: No BM overnight  : CRRT continues as ordered; Cuevas in place with minimal UOP of 0-10 mL/hr  Skin: Weeping in multiple spots on bilateral arms and abdomen and from device insertions sites (art line, dialysis line); weeping draining down in groin from arterial and dialysis lines requiring multiple bed and dressing changes throughout night  Lines: CVC, art, HD line  Drips: levophed and vasopressin    Major Shift Events: One unit platelets, 2 units FFP, and 10 units cryo given as ordered. Unable to meet 100 mL/hr goal d/t large amounts of blood products given and hemodynamic instability of patient with increasing pressor needs during blood product administration and increased fluid removal rate; fluid removal adjusted as hemodynamic allowed, per orders. Multiple bed and dressing changes d/t copious weeping from multiple sites on bilateral arms and device insertion sites.  Plan (Upcoming Events): Wean pressors as able  Discharge/Transfer Needs: TBD     Bedside Shift Report Completed :   Bedside Safety Check Completed:

## 2023-05-28 NOTE — CONSULTS
[facility]  Neurology Consultation         Natalie Delgadillo MD    Teresa De Santiago MRN# [id number]   YOB: 1950 Age: 73 year old      Date of Admission:  5/22/2023  Date of Consult: 5/28/2023           History of Present Illness:     Please refer to admission H/P and most recent progress note by Dr. Johnson.  Neurology was consulted for ongoing encephalopathy and decreased responsiveness as sedation is now being weaned.    Sedation with versed per nursing stopped on 5/24 at 12;00pm and fentanyl at 12:00 on 5/26    In brief Ms. De Santiago is a 73 year old woman with history of PBC diagnosed 10 years aog with esophageal varices, chronic anemia/throbocytopenia, solitary kidney (s/p nephrectomy for non malignant mass), CLL now admitted on 5/22 with confusion now diagnosed with septic shock related to E. Coli SBP with positive paracentesis.  Had acute worsening after admission requiring 3 pressors and CRRT. Has shown some evidence of improvement and epinephrine stopped 5/24. Has required multiple transfusions of blood products including cryo, FFP, PRBC's and PLT's for DIC.  Head CT on 5/26 with no acute pathology.  EEG on 5/27/2023 with slowing to theta/delta ranges with triphasic waves.            Past Medical History:     Patient Active Problem List   Diagnosis     History of breast cancer in female     Biliary cirrhosis (H)     Esophageal varices (H)     Portal hypertension (H)     S/p nephrectomy     Hypothyroidism     Hypoglycemia     Thrombocytopenia (H)     Gram-negative bacteremia     Septic shock (H)     Hypotension, unspecified hypotension type      Past Medical History:   Diagnosis Date     Cirrhosis, primary biliary (H)      Thyroid disease 20's             Past Surgical History:     Past Surgical History:   Procedure Laterality Date     GALLBLADDER SURGERY       MASTECTOMY  1994     NEPHRECTOMY              Home Medications:     Prior to Admission medications    Medication Sig Last  Dose Taking? Auth Provider Long Term End Date   Fish Oil-Cholecalciferol (OMEGA-3 FISH OIL/VITAMIN D3 PO) Take by mouth daily 5/21/2023 Yes Unknown, Entered By History     furosemide (LASIX) 20 MG tablet Take 20 mg by mouth Every Mon, Wed, Fri Morning 5/19/2023 at AM Yes Unknown, Entered By History Yes    nadolol (CORGARD) 20 MG tablet Take 40 mg by mouth At Bedtime 5/21/2023 at PM Yes Reported, Patient Yes    omeprazole (PRILOSEC) 40 MG DR capsule Take 1 capsule (40 mg) by mouth daily for 30 days Past Week Yes Finn Coats MD  6/16/23   QC ZINC PO Take by mouth 2 times daily 5/21/2023 Yes Unknown, Entered By History     spironolactone (ALDACTONE) 50 MG tablet Take 50 mg by mouth daily 5/21/2023 at AM Yes Unknown, Entered By History Yes    thyroid (ARMOUR) 90 MG tablet Take 90 mg by mouth daily 5/21/2023 Yes Reported, Patient Yes    ursodiol (ACTIGALL) 500 MG tablet Take 500 mg by mouth 2 times daily 5/21/2023 at x2 Yes Unknown, Entered By History     VITAMIN A PO Take by mouth daily  Yes Unknown, Entered By History              Current Medications:           B and C vitamin Complex with folic acid  5 mL Per Feeding Tube Daily     hydrocortisone sodium succinate PF  50 mg Intravenous Q6H     pantoprazole  40 mg Per Feeding Tube QAM AC     piperacillin-tazobactam  4.5 g Intravenous Q6H     protein modular  1 packet Per Feeding Tube TID     sodium chloride (PF)  3 mL Intracatheter Q8H     thyroid  90 mg Oral or Feeding Tube Daily     [Held by provider] ursodiol  500 mg Oral BID     sodium chloride 0.9%, sodium chloride 0.9%, calcium gluconate, calcium gluconate, dextrose, glucose **OR** dextrose **OR** glucagon, fentaNYL, magnesium sulfate, naloxone **OR** naloxone **OR** naloxone **OR** naloxone, - MEDICATION INSTRUCTIONS -, potassium chloride, prochlorperazine **OR** prochlorperazine **OR** prochlorperazine, sodium chloride (PF), sodium phosphate         Allergies:     Allergies   Allergen Reactions      Contrast Dye Hives            Social History:     Deferred          Family History:     Family History   Problem Relation Age of Onset     Cancer Mother      Heart Disease Mother      Osteoporosis Mother      Cerebrovascular Disease Mother      Seizure Disorder Mother      Thyroid Disease Mother      High cholesterol Other         sibling     Thyroid Disease Other         sibling             Review of Systems:   Not able to obtain due to patient condition            Physical Exam:    , Blood pressure 113/63, pulse 56, temperature 97.2  F (36.2  C), temperature source Temporal, resp. rate 15, weight 85.5 kg (188 lb 7.9 oz), SpO2 94 %.  188 lbs 7.89 oz     Cardio - on pressers   Resp - on Vent    Neurological Exam:  General: Mental status - Per nursing some limited spontaneous movement of face/jaw with vent cares this afternoon.  Grimaces to painful stimuli (sternal rub)   Cranial Nerves-  Pupils small 1- 2mm in diameter with little reactively to light given size.  Does have cough/gag per nursing.  No tracking of eyes or visibile eye movement/blinking for this examiner   Motor/Sensation: no posturing of extremities and no withdrawal to pain   Reflexes: Toes neutral  Other reflexes deferred due to positioning and edema                Data:   All new lab and imaging data was reviewed.  Recent Labs   Lab 05/28/23  0822 05/28/23  0634 05/28/23  0450 05/28/23  0340 05/28/23  0100 05/27/23  2211 05/27/23 2010 05/27/23  1604 05/27/23  1233 05/27/23  0558 05/27/23  0420 05/22/23  0841 05/21/23  1556   WBC  --   --  43.4*  --   --   --  48.3*  --   --   --  27.4*   < > 20.0*   HGB  --   --  8.5*  --   --   --  9.6*  --   --   --  8.7*   < > 10.2*   MCV  --   --  95  --   --   --  94  --   --   --  91   < > 97   PLT  --   --  60*  --   --   --  23*  --   --   --  16*   < > 25*   INR  --  1.75*  --   --   --  2.82*  --   --  2.65*   < >  --    < >  --    NA  --   --  135*  --   --   --  136  --  136  --  138   < > 130*    POTASSIUM  --   --  4.7  --   --   --  4.8  --  4.3  --  4.3   < > 4.7   CHLORIDE  --   --  101  --   --   --  103  --  101  --  103   < > 99   CO2  --   --  23  --   --   --  23  --  23  --  23   < > 20*   BUN  --   --  18.0  --   --   --  17.8  --  17.3  --  17.1   < > 39.0*   CR  --   --  0.94  --   --   --  0.92  --  0.93  --  0.94   < > 1.68*   ANIONGAP  --   --  11  --   --   --  10  --  12  --  12   < > 11   SONYA  --   --  8.0*  --   --   --  7.7*  --  7.7*  --  7.6*   < > 8.4*   *  --  116* 106*   < >  --  103*   < > 104*   < > 89   < > 87   ALBUMIN  --   --  3.0*  --   --   --  2.9*  --  2.8*  --  2.8*   < > 2.6*   PROTTOTAL  --   --  4.8*  --   --   --   --   --   --   --  4.4*   < > 5.4*   BILITOTAL  --   --  9.8*  --   --   --   --   --   --   --  7.7*   < > 4.6*   ALKPHOS  --   --  181*  --   --   --   --   --   --   --  155*   < > 294*   ALT  --   --  40*  --   --   --   --   --   --   --  39*   < > 79*   AST  --   --  71*  --   --   --   --   --   --   --  69*   < > 120*   LIPASE  --   --   --   --   --   --   --   --   --   --   --   --  42    < > = values in this interval not displayed.          Assessment and Plan:     Called and talked with ICU attending and given hospital course with sepsis treatment requiring high pressor need and low hg/platlets does have risk for hypoperfusion recommend MRI brain wo to eval  Ongoing encephalopathy.  Given known hepatic and renal function possible taking longer to clear meds and would continue to trend.  If not improvement and MRI without acute findings could consider repeat EEG for further eval.      Neuro will continue to follow.     45 min critical care

## 2023-05-29 NOTE — PROGRESS NOTES
Updated  at bedside.  Told him that some labs are better but worsening bilirubin is concerning for decompensated liver failure which is difficult to treat. .  Will continue current cares.

## 2023-05-29 NOTE — PLAN OF CARE
ICU End of Shift Summary.  For vital signs and complete assessments, please see documentation flowsheets.      Pertinent assessments: Grimacing, occ moving right foot. Temp 96-lukasz hugger on. Tele SB. BP maintained with levo and vaso. Minimal titrations down on levo. Lungs diminished. Continues on vent support. Minimal secretions. Cuevas in place with no to little output. Rectal tube placed for multiple large watery stools, 175mls out this shift. Skin very weepy. Multiple dressing changes completed with lines.   Major Shift Events: Continues on CRRT-unable to meet goal every hour related to hemodynamic stability, MD aware. Ca replaced. Very weepy from sites  Plan (Upcoming Events): Continue current plan of care  Discharge/Transfer Needs: Continue current plan of care     Bedside Shift Report Completed : Y  Bedside Safety Check Completed: Y

## 2023-05-29 NOTE — PROGRESS NOTES
Teresa De Santiago is a 73 year old female with PB cirrhosis admitted 5/22 with septic shock seconday to e coli bacteremia and SBP. Remains in multi-organ failure      96.5  RR 22  HR 90  110/50 on pressors  Briefly moves eyelids to stimulation, does not follow commands or move spontaneously   Jaundiced  ETT ok  Lungs clear   H- RR w/o m  Abdomen slightly distended  Guerra draining scant dark urine  Diffuse anasarca, weeping skin tears. ecchymoses scattered all over   HD Catheter in left groin with weeping  No cyanosis.         Neuro: encephalopathy likely seconday to infection plus liver failure.  EEG c/w metabolic encephalopathy / Fentanyl drip stopped 3 days ago. Slightly more responsive  CT showed no acute intracranial pathology. /w encephalopathy.     Pulm: Ventilator d 9. CMV 15 x 320  PEEP 5  Ve 6.2. 50%    Breathing above set ventilator rater.  PH. Normal   P: continue current ventilator settings.     CV: Septic shock. Lactate peaked at 16, now decreasing but still abnormal.  Hydrocortisone 50 q 6 for shock. Echo normal on 5/23  No arrhythmias. Remains on norepinephrine 0.12 and vasopressin 2.4 . 100 per hour net negative on CRRT. On steroids for refractory shock.    GI: PBC followed at [place]. Not a transplant candidate.  Increasing bilirubin c/w decompensated cirrhosis.  On ursodiol.      Tube feeds at 45--goal.     Renal: DANDRE,  50ml/d urinary output.  CRRT since admission.  100 net negative now but remains anasarca and + 6 kg by weight.  .  P: remove guerra to prevent CLABSI     Endo: Glucose levels reasonable. On [place] for thyroid dysfunction.    ID: SBP.  E. Coli bacteremia. E coli in ascites. Pansensitive but given patient's severely ill state will continue with Zosyn. . Blood culture from 5/24 negative    Heme: CLL plus liver and sepsis related DIC.  Not actively bleeding. Platelets low but better. , INR high, fibrinogen low and decreasing c/w yesterday.  Fibrinogen responded to cryo.  Does have CLL but not  treated.    P: 2   5 pack of cryo today   Change coagulation labs to daily     PPx: VTE, GI,    35 min of critical care time spent by me in caring for this patient exclusive of procedures.    Overall some stabilization but this is a 74 yo with chronic liver disease, CLL now with neuro, renal, heme, respiratory, cardiac, liver failure after 7 days of critical care so survival is unlikely.  Will d/w family.

## 2023-05-29 NOTE — PROGRESS NOTES
[facility]    Medicine Progress Note - Hospitalist Service    Date of Admission:  5/22/2023    Assessment & Plan   Teresa De Santiago is a 73 year old woman w/PMH PBC diagnosed approximately 10 years ago, esophageal varices, chronic anemia/thrombocytopenia, solitary kidney following very remote right nephrectomy for a nonmalignant mass, CLL who presented after having blood cultures returned positive for E. coli after visiting ED on 5/21 after she presented with malaise and abdominal pain that have been progressing over the several preceding days.     In the emergency department, Ms. De Santiago was fluid resuscitated with 2 L of crystalloid.  She was also incidentally found to be significantly hypoglycemic (glucose fell to 20 mg/dL) at which time she was given an amp of D50.  Due to the hypotension, the patient was started on norepinephrine with brisk response.  Had paracentesis w/ascitic fluid was cloudy and yellow.     Had acute worsening after admission requiring 3 pressors and CRRT. Has shown some evidence of improvement and epinephrine stopped 5/24. Has required multiple transfusions of blood products including cryo, FFP, PRBC's and PLT's for DIC.     Remains encephalopathic, CT head ok, EEG with general encephalopathic findings. Remains off versed drip since 5/24 and fentanyl since 5/25 and is slowly starting to show response to stimuli     Toxic and metabolic encephalopathy  -initially due to infection, metabolic derangements and then sedated on vent  -CT head negative acute findings  -EEG without evidence of seizures, general encephalopathic process noted  -pupils remain pinpoint, still very encephalopathic but some evidence of very slow neurologic recovery. Suspect due to poor perfusion will take extended period for sedatives to wash out but   -seen by neurology, consider MRI    Septic shock, persistent  E. coli SBP and bacteremia  -as above presented to ED with abd pain and paracentesis + e  coli with blood cultures also positive.  -started on Vanc and Zosyn emperically but continued clinical deterioration despite aggressive care. ECHO with preserved EF  -MRSA negative so stop Vanc 5/24, remains on Zosyn  -currently on levophed (dose reduced) and vasopressin drip. Epinephrine infusion stopped 5/24. Continue solucortef 50q6 for adrenal support  -palliative care following due to very poor prognosis, currently family still wants restorative care and remains full code  -repeat blood cx 5/24 no growth    Lactic acidosis  -likely multifactorial from sepsis, renal failure  -markedly improved, mildly elevated now    DANDRE on CKD stage 2 requiring CRRT  anasarca  Hx solitary kidney  AGMA  -Baseline creatinine appears to be 1.1-1.2 over the last year.  Admission creatinine is 2.2  -Cr is improved with CRRT, remains anuric but small amount of urine production now and able to pull some fluid. Remains anasarcic  -nephrology following    Suspected DIC w/anemia, thrombocytopenia, low fibrinogen  Hx chronic anemia, thrombocytopenia  -likely due to underlying sepsis but persistent and worsening despite PRBC's x1 on 5/24 and 25; cryoprecipitate on 5/23,24, 26, 28; PLT's 5/24, 26, 28; FFP 28  -coags and Hgb improved but monitor and transfuse as needed    Hypoglycemia  -fluids adjusted, nephrology following    Hx CLL, Primary biliary cirrhosis  -Patient is under the care of the [facility] service but is not being offered a transplant. Follows with [facility] as well, handy CLL in surveilance     Hypokalemia  -replete     Diet: Adult Formula Drip Feeding: Continuous Vital High Protein; Orogastric tube; Goal Rate: 45; mL/hr; Begin advancement by 10 mL q 8 hrs to goal rate as tolerated; Do not advance tube feeding rate unless K+ is = or > 3.0, Mg++ is = or > 1.5, and Phos ...    DVT Prophylaxis: Pneumatic Compression Devices  Cuevas Catheter: PRESENT, indication: Strict 1-2 Hour I&O  Lines: PRESENT      CVC Double  Lumen Right Internal jugular-Site Assessment: WDL except;Edematous  Arterial Line 05/22/23 Femoral-Site Assessment: WDL Except;Edematous  Hemodialysis Vascular Access Femoral vein catheter Left Femoral vein-Site Assessment: WDL except;Edematous      Cardiac Monitoring: ACTIVE order. Indication: ICU  Code Status: Full Code      Beka Johnson DO  Hospitalist Service  [facility]  Securely message with [employer] (more info)  Text page via [employer] Paging/Directory   ______________________________________________________________________    Interval History   No overnight events, neurologically seems to be grimacing and responding more today for me. Seen by neurology, appreciate recs. Tolerating CRRT well and pulling fluid but remains very edematous, weeping    Physical Exam   Vital Signs: Temp: (!) 96.6  F (35.9  C) (lukasz hugger in place) Temp src: Temporal   Pulse: 57   Resp: 18 SpO2: 95 % O2 Device: Mechanical Ventilator    Weight: 183 lbs 10.29 oz  Constitutional: obtunded, not moving extremeties  Eyes: very constricted but reactive pupils bilaterally  ENT: ETT in place  Respiratory: ventilatory breath sounds, no wheezing  Cardiovascular: regular rate and rhythm, no murmur  GI: muffled bowel sounds, distended, ascites worsening but not tense  Genitounirinary: guerra in place  Skin: bruising on extremeties  Neurologic: obtunded but grimacing and moving facial muscles to name, not moving extremeties or following commands, faint upgoing babinski     60 MINUTES SPENT BY ME on the date of service doing chart review, history, exam, documentation & further activities per the note.      Data   ------------------------- PAST 24 HR DATA REVIEWED -----------------------------------------------    I have personally reviewed the following data over the past 24 hrs:    38.9 (H)  \   8.3 (L)   / 37 (LL)     138 103 21.8 /  144 (H)   4.5 21 (L) 0.89 \       ALT: 43 (H) AST: 65 (H) AP: 183 (H) TBILI: 11.6 (H)   ALB:  2.9 (L) TOT PROTEIN: 4.6 (L) LIPASE: N/A       Procal: N/A CRP: N/A Lactic Acid: 3.1 (H)       INR:  2.48 (H) PTT:  42 (H)   D-dimer:  N/A Fibrinogen:  108 (L)       Imaging results reviewed over the past 24 hrs:   No results found for this or any previous visit (from the past 24 hour(s)).

## 2023-05-29 NOTE — PROGRESS NOTES
[facility]  Neurology Progress Note        Natalie Delgadillo MD   05/29/2023        Interval History:      Per nursing similar neuro alertness to yesterday afternoon, with grimaces to pain. Some spontaneous facial movement to cares and rare movement of legs                    Physical Exam:       , Blood pressure (!) 157/73, pulse 63, temperature 97.8  F (36.6  C), temperature source Temporal, resp. rate 18, weight 83.3 kg (183 lb 10.3 oz), SpO2 93 %.  Vitals:    05/27/23 0400 05/28/23 0630 05/29/23 0000   Weight: 87.4 kg (192 lb 10.9 oz) 85.5 kg (188 lb 7.9 oz) 83.3 kg (183 lb 10.3 oz)     Vital Signs with Ranges  Temp:  [96.1  F (35.6  C)-98.4  F (36.9  C)] 97.8  F (36.6  C)  Pulse:  [55-80] 63  Resp:  [7-26] 18  BP: (157)/(73) 157/73  MAP:  [57 mmHg-176 mmHg] 73 mmHg  Arterial Line BP: ()/() 115/50  FiO2 (%):  [40 %-50 %] 40 %  SpO2:  [83 %-96 %] 93 %      Neurological Exam:  General: Mental status - Has spontaneous movement of face/jaw with chewing movements.  Grimaces to painful stimuli   Cranial Nerves-  Pupils small 1- 2mm in diameter with little reactively to light given size.  Does have cough/gag per nursing.  No tracking of eyes or visibile eye movement/blinking for this examiner   Motor/Sensation: no posturing of extremities and no withdrawal to pain. Per nursing some slight movements in legs but not seen personally   Reflexes: Toes neutral  Other reflexes deferred due to positioning and edema              Medications:          B and C vitamin Complex with folic acid  5 mL Per Feeding Tube Daily     hydrocortisone sodium succinate PF  50 mg Intravenous Q6H     insulin aspart  1-4 Units Subcutaneous Q4H     pantoprazole  40 mg Per Feeding Tube QAM AC     piperacillin-tazobactam  4.5 g Intravenous Q6H     protein modular  1 packet Per Feeding Tube TID     sodium chloride (PF)  3 mL Intracatheter Q8H     thyroid  90 mg Oral or Feeding Tube Daily     [Held by provider] ursodiol   500 mg Oral BID     PRN Meds: sodium chloride 0.9%, sodium chloride 0.9%, calcium gluconate, calcium gluconate, dextrose, glucose **OR** dextrose **OR** glucagon, fentaNYL, magnesium sulfate, naloxone **OR** naloxone **OR** naloxone **OR** naloxone, - MEDICATION INSTRUCTIONS -, potassium chloride, prochlorperazine **OR** prochlorperazine **OR** prochlorperazine, sodium chloride (PF), sodium phosphate         Data:      All new lab and imaging data was reviewed.            Assessment and Plan:        Teresa De Santiago is a 73 year old female with PMH decompensated cirrhosis due to primary biliary cirrhosis, CLL, solitary kidney who was admitted on 5/22/2023 with septic shock due to E coli bacteremia, SBP, multi-organ failure needing CRRT since 5/23 remaining on 2 pressors with continued minimal responsiveness. Head CT with no acute injury and EEG with no epileptiform discharges and consistent with encephalopathy.     Neurological exam similar today to yesterday afternoon with some response to pain.  Discussed with ICU team this is fitting given ongoing multi-organ failure.  Now several days with no sedation. Discussed with family neurological recovery not only concern given overall general status with other ongoing severe medical issues.  Possible ongoing encephalopathy due to ongoing metabolic disturbances and prolonged recovery from sedation but reviewed would suspect medication effect playing less of a role.  Could still consider MRi brain to eval if other more acute brain injury cause occurred during hospital course. Defer need to ICU given other health needs/concerns.     Neuro will continue to follow     32 min spent on date of service of critical care patient

## 2023-05-29 NOTE — PROVIDER NOTIFICATION
Telehub called to FYI fibrinogen trending down currently 112. No new orders. Will continue to monitor

## 2023-05-29 NOTE — PLAN OF CARE
Goal Outcome Evaluation:      Plan of Care Reviewed With: patient, family    ICU End of Shift Summary.  For vital signs and complete assessments, please see documentation flowsheets.      Pertinent assessments: RASS goal -1 - -2; no sedation infusing at this time. Withradwals from repeated stimulation and raises eyebrows when calling out patients name. CPOT 0. Afebrile; bear hugger in place. Pupils 1mm. Continues on vent. Lungs coarse/diminished. Cloudy inline secretions. OG in place with TF at goal. Guerra in place with minimal output. CRRT. Sliding scale coverage started    Major Shift Events: -Patient with slight withdrawal for pain and repeated stimulation however does not follow commands.                                     -2 packs of cryo given.                                     -Rectal tube reinserted after dislodgement.                                     -Copious amount of weeping throughout body; mepilex, abdominal pads, and chucks applies. HD and a-line dressings changed multiple times throughout shift.                                      -Bladder scanned and picked up 400+ of fluid in abdomen. After talking with intensivist- will not pull out guerra due to unable to determine if fluid is urine or ascites.     Plan (Upcoming Events): Continue on CRRT. Monitor electrolytes. Monitor for signs of bleeding.   Discharge/Transfer Needs: TBD?     Bedside Shift Report Completed : yes  Bedside Safety Check Completed: yes

## 2023-05-29 NOTE — PROGRESS NOTES
[facility] ICU VENTILATOR RESPIRATORY NOTE     Date of Admission: 05/22/2023     Date of Intubation (most recent): 05/22/2023     Reason for Mechanical Ventilation: Respiratory Failure     Number of Days on Mechanical Ventilation: 8     Met Criteria for Pressure Support Trial: No     Reason for No Pressure Support Trial:Unstable/unsafe airway     Significant Events Today:     ABG Results:   Recent Labs   Lab 05/28/23  1901 05/28/23  0633 05/27/23 2010 05/27/23  1235   PH 7.44 7.44 7.45 7.47*   PCO2 36 37 37 36   PO2 65* 70* 62* 65*   HCO3 24 25 26 26   O2PER 50 50 45 45     ETT appearance on chest x-ray: 3.8 cm above [place]    Vent Mode: CMV/AC  (Continuous Mandatory Ventilation/ Assist Control)  FiO2 (%): 50 %  Resp Rate (Set): 15 breaths/min  Tidal Volume (Set, mL): 320 mL  PEEP (cm H2O): 5 cmH2O  Resp: 14    Bs coarse/diminished. Will continue to assess and monitor.    RT Trini on 5/29/2023 at 5:24 AM

## 2023-05-29 NOTE — PROGRESS NOTES
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       1)Anuric DANDRE   ATN   In setting of hypotension, sepsis, E coli bacteremia, peritonitis, and cirrhosis. Ischemic ATN. Started CRRT 5/23 though had lengthy discussion with patient's  over course of days that prognosis is not great. Will provide time-limited trial of dialysis, but would not recommend chronic dialysis if indicated.   CRRT started 5/23                  - L femoral mary                 - pre-filter phoxillum 4K 1000 ml/h                  - dialysate phoxillum 4K 1000 ml/h                  - post-filter phoxillum 4K 200 ml/h                 - goal UF net negative 100 ml/h if able     2) Septic shock due to E coli bacteremia   SBP   Improving, now down to 2 pressors.     3) Severe lactic acidosis, improved   AGMA, resolved  Lactate improved, took awhile.  Developed some alkalosis, thus switched post-filter solution.     4) CKD II   Solitary kidney   History of R nephrectomy   Baseline Cr 1-1.2 in setting of solitary kidney.        5) Acute liver failure  Cirrhosis due to PBC, decompensated   Coagulopathy   Slowly worsening cirrhosis since diagnosis with CLL. Dr. Cool, primary hepatologist has been concerned that CLL may be affecting her liver since liver decompensation correlating with CLL diagnosis. Pt not a liver transplant candidate currently due to age, referrals to other centers have been placed. Previously when patient was a few years younger, liver transplant eval was pursed preemptively, but patient's health was too good at the time.      6) Leukocytosis, improved   CLL   Follows at [facility].      7) Anemia  Thrombocytopenia  Chronic issues for patient due to liver disease. Worsened  due to sepsis and infection, requiring intermittent transfusions.     Plan/Recs:  1) continue CRRT   2) attempting net negative 100 ml/h   3) no changes today   4) K trending up, could represent brewing infection but other markers stable or improved so no further workup needed for now     Discussed with bedside RN.     Caroline Tirado MD   [facility]  Office: [phone]          Interval History:     - no acute events overnight   - able to UF today   - no meaningful UOP (~10 ml/ 2 hours)   - grimacing and spontaneously moved her foot   -  updated   - no issues with CRRT   - continued weeping around lines          Medications and Allergies:       B and C vitamin Complex with folic acid  5 mL Per Feeding Tube Daily     hydrocortisone sodium succinate PF  50 mg Intravenous Q6H     pantoprazole  40 mg Per Feeding Tube QAM AC     piperacillin-tazobactam  4.5 g Intravenous Q6H     protein modular  1 packet Per Feeding Tube TID     sodium chloride (PF)  3 mL Intracatheter Q8H     thyroid  90 mg Oral or Feeding Tube Daily     [Held by provider] ursodiol  500 mg Oral BID        Allergies   Allergen Reactions     Contrast Dye Hives            Physical Exam:   Vitals were reviewed  BP (!) 157/73 (BP Location: Right leg)   Pulse 60   Temp 97.6  F (36.4  C)   Resp 17   Wt 83.3 kg (183 lb 10.3 oz)   SpO2 93%   BMI 34.70 kg/m      Wt Readings from Last 3 Encounters:   05/29/23 83.3 kg (183 lb 10.3 oz)   05/21/23 72.1 kg (159 lb)   05/16/23 68 kg (150 lb)       Intake/Output Summary (Last 24 hours) at 5/24/2023 1527  Last data filed at 5/24/2023 1400  Gross per 24 hour   Intake 6436.99 ml   Output 76 ml   Net 6360.99 ml       GENERAL APPEARANCE: NAD, intubated, sedated   HEENT:  ETT in place   RESP: crackles  CV: RRR  ABDOMEN: distended and firm  EXTREMITIES/SKIN: anasarca, 3-4+ edema in all extremities with weeping  NEURO:  Sedated, grimaces to pain   LINES:  + guerra with a couple ml of concentrated  urine           Data:     BMP  Recent Labs   Lab 05/29/23  1210 05/29/23  0802 05/29/23  0401 05/29/23  0400 05/28/23  2356 05/28/23 1958 05/28/23  1600 05/28/23  1422 05/28/23  0822 05/28/23  0450   NA  --   --  138  --   --  137  --  138  --  135*   POTASSIUM  --   --  4.5  --   --  4.4  --  4.6  --  4.7   CHLORIDE  --   --  103  --   --  102  --  104  --  101   SONYA  --   --  7.6*  --   --  7.9*  --  8.0*  --  8.0*   CO2  --   --  21*  --   --  21*  --  23  --  23   BUN  --   --  21.8  --   --  21.0  --  20.1  --  18.0   CR  --   --  0.89  --   --  0.93  --  0.97*  --  0.94   * 144* 173* 163*   < > 162*   < > 136*   < > 116*    < > = values in this interval not displayed.     CBC  Recent Labs   Lab 05/29/23  0401 05/28/23 1958 05/28/23  1422 05/28/23  0450   WBC 38.9* 34.2* 35.3* 43.4*   HGB 8.3* 8.1* 7.8* 8.5*   HCT 26.0* 24.5* 24.0* 25.6*   MCV 98 97 96 95   PLT 37* 39* 40* 60*     Lab Results   Component Value Date    AST 65 (H) 05/29/2023    ALT 43 (H) 05/29/2023    ALKPHOS 183 (H) 05/29/2023    BILITOTAL 11.6 (H) 05/29/2023    BILICONJ 0.0 06/20/2014    LITZY 46 05/27/2023     Lab Results   Component Value Date    INR 2.48 (H) 05/29/2023       Attestation:  I have reviewed today's vital signs, notes, medications, labs and imaging.    Caroline Tirado MD  [facility] - Nephrology  Office: [phone]

## 2023-05-29 NOTE — PROGRESS NOTES
"         [facility]  Respiratory Care Note    [facility] ICU VENTILATOR RESPIRATORY NOTE  Date of Admission: 05/22/2023  Date of Intubation (most recent): 05/22/2023  Reason for Mechanical Ventilation: Respiratory Failure  Number of Days on Mechanical Ventilation: 8  Met Criteria for Pressure Support Trial: No  Length of Pressure Support Trial:   Reason for Stopping Pressure Support Trial:   Reason for No Pressure Support Trial: Due to current respiratory status, on pressors and CRRT.   Significant Events Today: suctioning out a scant amount of thick cloudy secretions.   ABG Results: 7.42/37/115/24 @0603 05/29  ETT appearance on chest x-ray: 3.8 cm above [place]     Plan:  Will continue to monitor and assess the pt's current respiratory status and needs, in hopes of liberating her from the ventilator.     Vent Mode: CMV/AC  (Continuous Mandatory Ventilation/ Assist Control)  FiO2 (%): 40 %  Resp Rate (Set): 15 breaths/min  Tidal Volume (Set, mL): 320 mL  PEEP (cm H2O): 5 cmH2O  Resp: 18    Vital signs:  Temp: 97.5  F (36.4  C) Temp src: Temporal BP: (!) 157/73 Pulse: 61   Resp: 18 SpO2: 93 % O2 Device: Mechanical Ventilator     Weight: 83.3 kg (183 lb 10.3 oz)  Estimated body mass index is 34.7 kg/m  as calculated from the following:    Height as of 5/21/23: 1.549 m (5' 1\").    Weight as of this encounter: 83.3 kg (183 lb 10.3 oz).      Recent Labs   Lab 05/29/23  0603 05/28/23  1901 05/28/23  0633 05/27/23 2010   PH 7.42 7.44 7.44 7.45   PCO2 37 36 37 37   PO2 115* 65* 70* 62*   HCO3 24 24 25 26   O2PER 50 50 50 45     Ge Miles RT  [facility]  5/29/2023       "

## 2023-05-30 NOTE — PROGRESS NOTES
[facility] ICU VENTILATOR RESPIRATORY NOTE     Date of Admission: 05/22/2023     Date of Intubation (most recent): 05/22/2023     Reason for Mechanical Ventilation: Respiratory Failure     Number of Days on Mechanical Ventilation: 9     Met Criteria for Pressure Support Trial: No     Reason for No Pressure Support Trial:Pt continues to be on 2 pressors      ABG Results:   Recent Labs   Lab 05/29/23  0603 05/28/23  1901 05/28/23  0633 05/27/23 2010   PH 7.42 7.44 7.44 7.45   PCO2 37 36 37 37   PO2 115* 65* 70* 62*   HCO3 24 24 25 26   O2PER 50 50 50 45     Vent Mode: CMV/AC  (Continuous Mandatory Ventilation/ Assist Control)  FiO2 (%): 40 %  Resp Rate (Set): 15 breaths/min  Tidal Volume (Set, mL): 320 mL  PEEP (cm H2O): 5 cmH2O  Resp: 20    Bs diminished. Suctioned for small thick brown secretions. Will continue to assess and monitor.    Missael Groves RT on 5/30/2023 at 4:34 AM

## 2023-05-30 NOTE — PLAN OF CARE
Goal Outcome Evaluation:      Plan of Care Reviewed With: patient    Overall Patient Progress: no changeOverall Patient Progress: no change     ICU End of Shift Summary.  For vital signs and complete assessments, please see documentation flowsheets.     Neuro: Does not open eyes/follow commands but is chewing tube more consistently, withdraws from pain, occ shrugging shoulders/kicking feet and sliding hands  Cardiac: SR, BP requiring  vaso and levo to maintain map >60, weaning weaning as able  Resp: vent supported, lungs dim, secretions clear to orange  GI: tolerating TF @ 45 (goal), stooling in and around rectal tube  : scant hope urine output  Skin: generalized bruising and weeping, jaundice, 4+ edema  Lines: L fem HD, L fem A line, R internal jugular   Drips: levo, vaso    Shift Events:     Ionized calcium 4.3 this AM, replacing per crrt prn    1 pack cryo, 1u plt  Plan (Upcoming Events): continue current poc, monitor neuros, wean pressors as able  Discharge/Transfer Needs: tbd     Bedside Shift Report Completed : y  Bedside Safety Check Completed: y    , Yomi, updated in person in evening, via phone HS and AM     CRRT STATUS NOTE          Pressures WNL:  YES  Filter Status:  WDL    Problems Reported/Alarms Noted:  dialysate flow rate error      ASSESSMENT:  Patient Net Fluid Balance:       Urine status: scant hope urine output  Vital Signs: Temp:  [96.9  F (36.1  C)-98.4  F (36.9  C)] 97  F (36.1  C)  Pulse:  [55-79] 65  Resp:  [11-29] 20  BP: (119-157)/(55-73) 119/55  MAP:  [57 mmHg-107 mmHg] 75 mmHg  Arterial Line BP: ()/(10-99) 123/51  FiO2 (%):  [40 %-50 %] 40 %  SpO2:  [90 %-100 %] 92 %       Temperature managed with   Blood warmer YES  Forced warm air YES  Blankets NO    Vasoactive gtts? YES, levo and vaso        Goals of Therapy:  net negative 100 each hour       INTERVENTIONS/Significant Events:   (lines reversed, electrolyte replacement, other) calcium gluconate given for ionized ca  4.3, 1u plt and 1 cryo    PLAN/Meeting Goal:  Continue with CRRT per plan of care. Fluid removal per plan of care. Due for filter change (q 72hrs)  Due at 5/31 1300 or sooner if indicated.     Unable to meet goal of net negative 100 at times due to hypotension

## 2023-05-30 NOTE — PROGRESS NOTES
Teresa De Santiago is a 73 year old female with PB cirrhosis admitted 5/22 with septic shock seconday to e coli bacteremia and SBP. Remains in multi-organ failure      96.7 RR 22  HR 80 120/50 on pressors  Briefly moves lips to stimulation, does not follow commands or move spontaneously or open eyes.  Conjunctival edema and Jaundiced  ETT ok  Lungs clear   H- RR w/o m  Abdomen slightly distended  Guerra draining dark urine  Diffuse anasarca, weeping skin tears. ecchymoses scattered all over   HD Catheter in left groin with weeping  No cyanosis.         Neuro: encephalopathy seconday to infection plus liver failure.  EEG c/w metabolic encephalopathy / Fentanyl drip stopped 4 days ago.  CT showed no acute intracranial pathology.    Pulm: Ventilator d 10. CMV 15 x 320  PEEP 5  Ve 6.2. 40%    Breathing above set ventilator rater. On PS 7/5 she has RSBI of 100.   P: continue current ventilator settings.     CV: Septic shock from e coli bacteremia. Lactate peaked at 16, now decreasing. Hydrocortisone 50 q 6 for shock. Echo normal on 5/23  No arrhythmias. Remains on norepinephrine 0.12 and vasopressin 2.4 . But able to pull 180-200 per hour net negative on CRRT.     P: decrease vasopressin to 1.2, wean norepinephrine as tolerated     GI: PBC followed at [facility]. Not a transplant candidate.  Increasing bilirubin c/w decompensated cirrhosis, about the same as yesterday. But coagulation parameters remain abnormal c/w poor synthetic function,   P: restart ursodiol that had been held    Tube feeds at 45--goal.     Renal: DANDRE,  50ml/d urinary output.  CRRT since admission.  200 net negative now but remains anasarca but weight down 2 kg since yesterday .  P: unable to remove guerra because of inaccurate bladder scan results     Endo: Glucose levels ok . On Embudo for thyroid dysfunction.    ID: SBP.  E. Coli bacteremia. .Zosyn d 7    Heme: CLL plus liver and sepsis related DIC.  Not actively bleeding. Platelets low to 20 Kso needing  transfusion today.  INR high, fibrinogen low and decreasing c/w so needing cryo replacement Does  Have recent dx of  CLL but not treated.    P: 2   5 pack of cryo today     PPx: VTE, GI,    35 min of critical care time spent by me in caring for this patient exclusive of procedures.

## 2023-05-30 NOTE — PROGRESS NOTES
CXR with left pleural effusion and atelectasis that is not much worse than 7 days ago. Now off vasopressin and coming down on norepinephrine.  Remains encephalopathic.  Repeat platelets increased so no more needed today.      If is off pressors by tomorrow may be able to switch to iHD.

## 2023-05-30 NOTE — PROGRESS NOTES
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 Coli bacteremia, severe lactic acidosis, SBP, multi-organ failure. She has been intubated. She is requiring CRRT for DANDRE and pressors for support of blood pressure.     Her  was by her side. Her son and his girlfriend came towards the end. She is improving overall. She needed platelet transfusion yesterday and has responded nicely. Her platelet counts have increased to 39k. She also was transfused cryoprecipitate.      #DIC due to underlying septic shock/E. Coli bacteremia and multi-organ failure  #Acute anemia  #Acute thrombocytopenia  #Acute neutrophilia  #Direct hyperbilirubinemia  #Coagulopathy  #History of CLL, IGHV mutated, in observation  -Peripheral smear negative for schistocytes, making MAHA/TTP/HUS very unlikely  -Her baseline platelet count is around 30.  The current platelet count is in fact slightly higher than her baseline.  I have pasted platelet count trend above.  We have only a couple of values from the past but her platelet count where 31 K in 2010 and 34 K in 2016; not very different from now; likely related to the liver disease rather than her CLL   CLL can cause autoimmune processes including ITP like destruction of platelets and autoimmune hemolytic anemia   *Her current platelet counts do not represent progression of CLL but rather her critical condition  -She continues to have elevated INR of 2.5, aPTT of 43 and low fibrinogen of 98 and hyperbilirubinemia suggestive of ongoing DIC.   -INR should improve as she received cryoprecipitate today.     -Transfuse 5U cryoprecipitate for fibrinogen <100 (she is on CRRT, which may be contributing to the refractoriness of cryo transfusion, in addition to ongoing septic  shock).  -Transfuse PRBC for Hb </=7.  -Transfuse PLT for PLT </= 20K.     #Acute VDRF  #Acute septic shock/E. Coli bacteremia  #Acute decompensated cirrhosis with underlying PBC  -Patient remains vented and sedated.  -She remains on pressors  and stress-dose cortef.     #Acute renal failure on CRRT  -Nephrology following.     No new recommendations for now.  Hematology to follow peripherally. Please contact our team with any further questions or concerns.     35 minutes spent on the date of the encounter doing chart review, history and exam, documentation and further activities as noted above

## 2023-05-30 NOTE — PROGRESS NOTES
[facility]    Medicine Progress Note - Hospitalist Service    Date of Admission:  5/22/2023    Assessment & Plan   Teresa De Santiago is a 73 year old woman w/PMH PBC diagnosed approximately 10 years ago, esophageal varices, chronic anemia/thrombocytopenia, solitary kidney following very remote right nephrectomy for a nonmalignant mass, CLL who presented after having blood cultures returned positive for E. coli after visiting ED on 5/21 after she presented with malaise and abdominal pain that have been progressing over the several preceding days.     In the emergency department, Ms. De Santiago was fluid resuscitated with 2 L of crystalloid.  She was also incidentally found to be significantly hypoglycemic (glucose fell to 20 mg/dL) at which time she was given an amp of D50.  Due to the hypotension, the patient was started on norepinephrine with brisk response.  Had paracentesis w/ascitic fluid was cloudy and yellow.     Had acute worsening after admission requiring 3 pressors and CRRT. Has shown some evidence of improvement and epinephrine stopped 5/24. Has required multiple transfusions of blood products including cryo, FFP, PRBC's and PLT's for DIC.     Remains encephalopathic, CT head ok, EEG with general encephalopathic findings. Remains off versed drip since 5/24 and fentanyl since 5/25 and is slowly starting to show response to stimuli but movement appears non purposeful. MRI recommended by neuro if persistently encephalopathic, intensivist wishing to hold off for now as felt to be metabolic.     Toxic and metabolic encephalopathy  -initially due to infection, metabolic derangements and then sedated on vent  -CT head negative acute findings  -EEG without evidence of seizures, general encephalopathic process noted  -pupils remain pinpoint, still very encephalopathic but some evidence of very slow neurologic recovery. Suspect due to poor perfusion will take extended period for sedatives to wash  out, showing slow improvement but non purposeful movement  -seen by neurology, consider MRI. Defer decision to neurology/intensivist    Septic shock, persistent  E. coli SBP and bacteremia  -as above presented to ED with abd pain and paracentesis + e coli with blood cultures also positive.  -started on Vanc and Zosyn emperically but continued clinical deterioration despite aggressive care. ECHO with preserved EF  -MRSA negative so stop Vanc 5/24, remains on Zosyn  -currently on levophed (dose reduced) and vasopressin drip. Epinephrine infusion stopped 5/24. Continue solucortef 50q6 for adrenal support  -palliative care following due to very poor prognosis, currently family still wants restorative care and remains full code  -repeat blood cx 5/24 no growth    Lactic acidosis  -likely multifactorial from sepsis, renal failure  -markedly improved, mildly elevated now    DANDRE on CKD stage 2 requiring CRRT  anasarca  Hx solitary kidney  AGMA  -Baseline creatinine appears to be 1.1-1.2 over the last year.  Admission creatinine is 2.2  -Cr is improved with CRRT, remains anuric but small amount of urine production now and able to pull some fluid. Remains anasarcic  -nephrology following    Suspected DIC w/anemia, thrombocytopenia, low fibrinogen  Hx chronic anemia, thrombocytopenia  -likely due to underlying sepsis but persistent and worsening despite PRBC's x1 on 5/24 and 25; cryoprecipitate on 5/23,24, 26, 28, 30; PLT's 5/24, 26, 28, 30; FFP 28  -coags and Hgb improved but monitor and transfuse as needed, additional transfusion today (PLT and cryo)    Hypoglycemia  -fluids adjusted, nephrology following    Hx CLL, Primary biliary cirrhosis  -Patient is under the care of the [facility] hepatology service but is not being offered a transplant. Follows with [facility] oncology as well, handy CLL in surveilance     Hypokalemia  -replete     Diet: Adult Formula Drip Feeding: Continuous Vital High Protein; Orogastric tube; Goal Rate:  45; mL/hr; Begin advancement by 10 mL q 8 hrs to goal rate as tolerated; Do not advance tube feeding rate unless K+ is = or > 3.0, Mg++ is = or > 1.5, and Phos ...    DVT Prophylaxis: Pneumatic Compression Devices  Guerra Catheter: PRESENT, indication: Strict 1-2 Hour I&O  Lines: PRESENT      CVC Double Lumen Right Internal jugular-Site Assessment: WDL except;Edematous;Leaking  Arterial Line 05/22/23 Femoral-Site Assessment: WDL Except;Edematous;Leaking  Hemodialysis Vascular Access Femoral vein catheter Left Femoral vein-Site Assessment: WDL except;Edematous;Leaking      Cardiac Monitoring: ACTIVE order. Indication: ICU  Code Status: Full Code      Beka Johnson DO  Hospitalist Service  [facility]  Securely message with [employer] (more info)  Text page via [facility] Paging/Directory   ______________________________________________________________________    Interval History   Remains on pressors, intubated. Showing increased movement but non purposeful. Remains on CRRT. Clinically about the same, extensive weeping noted    Physical Exam   Vital Signs: Temp: 97.5  F (36.4  C) Temp src: Axillary BP: 129/60 Pulse: 81   Resp: 20 SpO2: 92 % O2 Device: Mechanical Ventilator    Weight: 175 lbs 4.25 oz  Constitutional: obtunded, not moving extremeties  Eyes: very constricted but reactive pupils bilaterally  ENT: ETT in place  Respiratory: ventilatory breath sounds, no wheezing  Cardiovascular: regular rate and rhythm, no murmur  GI: muffled bowel sounds, distended, ascites worsening but not tense  Genitounirinary: guerra in place  Skin: bruising on extremeties  Neurologic: obtunded but grimacing and moving facial muscles to name, not moving extremeties or following commands, faint upgoing babinski     60 MINUTES SPENT BY ME on the date of service doing chart review, history, exam, documentation & further activities per the note.      Data   ------------------------- PAST 24 HR DATA REVIEWED  -----------------------------------------------    I have personally reviewed the following data over the past 24 hrs:    31.9 (H)  \   7.7 (L)   / 20 (LL)     138; 138 104; 104 24.7 (H); 24.7 (H) /  125 (H)   4.1; 4.1 21 (L); 21 (L) 0.90; 0.90 \       ALT: 46 (H) AST: 62 (H) AP: 177 (H) TBILI: 11.9 (H)   ALB: 2.7 (L); 2.7 (L) TOT PROTEIN: 4.4 (L) LIPASE: N/A       INR:  2.51 (H) PTT:  43 (H)   D-dimer:  N/A Fibrinogen:  98 (LL)       Imaging results reviewed over the past 24 hrs:   No results found for this or any previous visit (from the past 24 hour(s)).

## 2023-05-30 NOTE — PLAN OF CARE
Goal Outcome Evaluation:      Plan of Care Reviewed With: patient, family    Goal Outcome Evaluation:       Plan of Care Reviewed With: patient, family     ICU End of Shift Summary.  For vital signs and complete assessments, please see documentation flowsheets.      Pertinent assessments: RASS goal -1 - -2; no sedation infusing at this time. Withradwals from repeated stimulation and raises eyebrows when calling out patients name. CPOT 0. Afebrile; bear hugger in place. Pupils 1mm. Continues on vent. Lungs coarse/diminished. Cloudy inline secretions. OG in place with TF at goal. Cuevas in place with minimal output. CRRT. Sliding scale coverage started     Major Shift Events: -Cryo x1 infused                                     -      Plan (Upcoming Events): Continue on CRRT. Monitor electrolytes. Monitor for signs of bleeding.   Discharge/Transfer Needs: TBD?     Bedside Shift Report Completed : yes  Bedside Safety Check Completed: yes

## 2023-05-30 NOTE — PROVIDER NOTIFICATION
2113: Notified tele hub Svitlana LOPEZ, plt trending down from 29 to 25. No active bleeding noted, received 2 cryo earlier today    Per tele MD, continue to monitor    0445: notified tele hub Svitlana LOPEZ, of plt 20, fibrinogen 98, INR 2.51.

## 2023-05-30 NOTE — PROGRESS NOTES
[facility]  Neurology Progress Note               Interval History:   Patient is minimally responsive. No significant change since yesterday, h/o PBC diagnosed approximately 10 years ago, esophageal varices, chronic anemia/thrombocytopenia, solitary kidney following very remote right nephrectomy for a nonmalignant mass.  She had Blood cultures positive for E. Coli at admission.Patient is on CRRT.              Medications:   I have reviewed this patient's current medications               Physical Exam:   Blood pressure 129/60, pulse 89, temperature 98.3  F (36.8  C), temperature source Axillary, resp. rate 25, weight 79.5 kg (175 lb 4.3 oz), SpO2 (!) 88 %.  No spontaneous eye opening. No response to painful stimulus. Plantars- withdrawal bilaterally.         Data:   Recent labs, imaging, and other studies were reviewed.         Lab Results   Component Value Date     05/30/2023     12/12/2016    Lab Results   Component Value Date    CHLORIDE 103 05/30/2023    CHLORIDE 110 12/12/2016    Lab Results   Component Value Date    BUN 27.1 05/30/2023    BUN 16 12/12/2016      Lab Results   Component Value Date    POTASSIUM 4.0 05/30/2023    POTASSIUM 4.2 12/12/2016    Lab Results   Component Value Date    CO2 21 05/30/2023    CO2 28 12/12/2016    Lab Results   Component Value Date    CR 0.95 05/30/2023    CR 0.68 12/12/2016        Lab Results   Component Value Date    WBC 36.4 (H) 05/30/2023    HGB 7.6 (L) 05/30/2023    HCT 23.3 (L) 05/30/2023    MCV 99 05/30/2023    PLT 39 (LL) 05/30/2023     Lab Results   Component Value Date    INR 2.51 (H) 05/30/2023            Assessment and Plan:   Toxic - metabolic encephalopathy- Patient had bacteremia and septic shock. She has suspected DIC.  Her decreased responsiveness is most likely due to toxic metabolic encephalopathy. There could also be ischemic lesions in the brain and brainstem area in the setting of DIC.I would recommend MRI brain if no  significant change in the next 48 hours,

## 2023-05-30 NOTE — PROGRESS NOTES
Critical access hospital ICU VENTILATOR RESPIRATORY NOTE  Date of Admission: 5/22/23  Date of Intubation (most recent):5/22/23  Reason for Mechanical Ventilation: Respiratory Failure  Number of Days on Mechanical Ventilation:9  Met Criteria for Pressure Support Trial: No  Reason for No Pressure Support Trial: pt is on pressors  ETT appearance on chest x-ray: In appropriate place    .Respiratory Therapy  Patient remains intubated on mechanical ventilator with the following settings:    Vent Mode: CMV/AC  (Continuous Mandatory Ventilation/ Assist Control)  FiO2 (%): 35 %  Resp Rate (Set): 15 breaths/min  Tidal Volume (Set, mL): 320 mL  PEEP (cm H2O): 5 cmH2O  Resp: 23    Temp: 98.3  F (36.8  C) Temp src: Axillary BP: 129/60 Pulse: 81   Resp: 23 SpO2: (!) 89 % O2 Device: Mechanical Ventilator      BS were coarse and dimnished. Suctioned small amounts of cloudy, red streaked thick secretions through the ETT.  Will continue to follow and assess the patient's respiratory needs.    Yelena Grimes,   5/30/2023 5:09 PM

## 2023-05-30 NOTE — PROGRESS NOTES
"  Spiritual Health Services Progress Note  [facility] ICU    Saw pt Teresa De Santiago per Palliative Care team consultation. Pt remains sedated and intubated.      Patient/Family Understanding of Illness and Goals of Care -   Pt spouse-Yomi and son-Romaine are at the bedside.  Yomi and Romaine shared they are hopeful today that Teresa appears to be making progress.      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  We discussed how being present, encouraging Teresa and offering prayers are all helpful at this time.       Strengths, Coping, and Resources - Teresa is supported by spouse-Yomi, sons- Romaine and Adrian and daughter-Corin.       Meaning, Beliefs, and Spirituality - Family identify as Orthodox and welcomed a time of prayer.       Plan of Care - Spiritual Health remains available throughout hospital stay.      Frankie Hagen MA  Staff   [phone]    [facility] routine referrals *[phone]  [facility] available 24/7 for emergent requests/referrals, either by having the on-call  paged or by entering an ASAP/STAT consult in Epic   (this will also page the on-call ).                 "

## 2023-05-31 NOTE — PROGRESS NOTES
Renal Medicine Progress Note    SHORTHAND KEY FOR MY NOTES:  c = with, s = without, p = after, a = before, x = except, asx = asymptomatic, tx = transplant or treatment, sx = symptoms or symptomatic, cx = canceled or culture, rxn = reaction, yday = yesterday, nl = normal, abx = antibiotics, fxn = function, dx = diagnosis, dz = disease, m/h = melena/hematochezia, c/d/l/ha = cramping/dizziness/lightheadedness/headache, d/c = discharge or diarrhea/constipation, f/c/n/v = fevers/chills/nausea/vomiting, cp/sob = chest pain/shortness of breath, tbv = total body volume, rxn = reaction, tdc = tunneled dialysis catheter, pta = prior to admission, hd = hemodialysis, pd = peritoneal dialysis, hhd = home hemodialysis, edw = estimated dry wt         Assessment/Plan:     1.  Oliguric DANDRE.  Pt remains on CRRT and is tolerating volume removal.  Even so, she is quite TBV up due to third-spacing.  We will try to mobilize more fluid removal c the aid of IV alb.  Chemistries are ok.  A.  CRRT c same parameters.  B.  Will give 25% alb q 6h to help mobilize fluid.    2.  Resp failure.  Pt remains on the vent and FIO2 is up to 60% today.  Lung exam is coarse.  A.  Per ICU team.    3.  E. Coli sepsis syndrome + possible DIC.  She is on pressors, but at a lower dose.  She is still on broad abx.  WBC remains elevated, in part due to her CLL.  She rec'd cryo yday.  A.  Wean pressors as able.  B.  Continue abx at proper dose for CRRT.  C.  Follow labs, clinically.    4.  Anemia.  Hb remains low, but stable.  A.  Follow labs.    5.  Cirrhosis 2 PBC.  Pt's bili is rising acutely and INR is high.  Abd is more distended today.   A.  Monitor LFTs, clinically.    6.  FEN.  Electrolytes are ok x low Ca.  She is getting replacement.  She is on TF.  A.  Check ICa daily and replace prn.        Interval History:     Unable.  Pt is intubated/sedated.          Medications and Allergies:       B and C vitamin Complex with folic acid  5 mL Per Feeding Tube  Daily     ciprofloxacin  500 mg Per Feeding Tube Q24H CAREN     hydrocortisone sodium succinate PF  25 mg Intravenous Q6H     insulin aspart  1-4 Units Subcutaneous Q4H     pantoprazole  40 mg Per Feeding Tube QAM AC     protein modular  1 packet Per Feeding Tube TID     thyroid  90 mg Oral or Feeding Tube Daily     ursodiol  500 mg Oral BID      Allergies   Allergen Reactions     Contrast Dye Hives          Physical Exam:     Vitals were reviewed   , Blood pressure 129/60, pulse 78, temperature 98.5  F (36.9  C), temperature source Temporal, resp. rate 20, weight 76 kg (167 lb 8.8 oz), SpO2 94 %.  Wt Readings from Last 3 Encounters:   05/31/23 76 kg (167 lb 8.8 oz)   05/21/23 72.1 kg (159 lb)   05/16/23 68 kg (150 lb)     Intake/Output Summary (Last 24 hours) at 5/31/2023 1215  Last data filed at 5/31/2023 1100  Gross per 24 hour   Intake 2336.57 ml   Output 3886 ml   Net -1549.43 ml     GENERAL APPEARANCE: intubated, sedated  HEENT:  eyes/ears/nose/neck grossly nl x + OG  RESP: + coarse rhonchi, vent sounds  CV: RRR; nl S1/S2   ABDOMEN: s/nt, + distended  EXTREMITIES/SKIN: + jaundice, + anasarca  NEURO:  sedated  LINES:  + guerra, + RIJ 2-lumen, + L fem temp HD catheter, + rectal tube, + fem art line    Pt seen on CRRT.  Stable run.  Good BFR via L fem.  -100-200 ml/h UF.         Data:     CBC RESULTS:     Recent Labs   Lab 05/31/23  0515 05/30/23 1956 05/30/23  1210 05/30/23  0404 05/29/23 1956 05/29/23  1239   WBC 40.9* 39.0* 36.4* 31.9* 35.3* 33.3*   RBC 2.52* 2.42* 2.36* 2.42* 2.48* 2.53*   HGB 8.1* 7.9* 7.6* 7.7* 7.9* 7.9*   HCT 24.8* 23.8* 23.3* 23.5* 24.6* 24.4*   PLT 31* 39* 39* 20* 25* 29*     Basic Metabolic Panel:  Recent Labs   Lab 05/31/23  0815 05/31/23  0515 05/31/23  0406 05/30/23  2354 05/30/23 1956 05/30/23 1953 05/30/23  1212 05/30/23  1210 05/30/23  0835 05/30/23  0404 05/30/23  0017 05/29/23 1956 05/29/23  1538 05/29/23  1239   NA  --  137  137  --   --  138  --   --  138  --  138  138  --   138  --  138   POTASSIUM  --  4.1  4.1  --   --  4.0  --   --  4.0  --  4.1  4.1  --  4.1  --  4.2   CHLORIDE  --  103  103  --   --  103  --   --  103  --  104  104  --  105  --  105   CO2  --  20*  20*  --   --  20*  --   --  21*  --  21*  21*  --  21*  --  22   BUN  --  27.7*  27.7*  --   --  30.1*  --   --  27.1*  --  24.7*  24.7*  --  23.3*  --  22.1   CR  --  0.90  0.90  --   --  0.96*  --   --  0.95  --  0.90  0.90  --  0.88  --  0.88   * 168*  168* 159* 142* 176* 154*   < > 181*   < > 188*  188*   < > 176*   < > 169*   SONYA  --  7.9*  7.9*  --   --  8.3*  --   --  8.1*  --  7.7*  7.7*  --  7.9*  --  7.6*    < > = values in this interval not displayed.     INR  Recent Labs   Lab 05/31/23  0515 05/30/23  0404 05/29/23  0602 05/28/23  2159   INR 2.53* 2.51* 2.48* 2.23*      Attestation:   I have reviewed today's relevant vital signs, notes, medications, labs and imaging.    Phillip Recinos MD  [facility] - Nephrology  [phone]

## 2023-05-31 NOTE — PROGRESS NOTES
Critical access hospital ICU VENTILATOR RESPIRATORY NOTE  Date of Admission: 5/22/23  Date of Intubation (most recent):5/22/23  Reason for Mechanical Ventilation: Respiratory Failure  Number of Days on Mechanical Ventilation: 10  Met Criteria for Pressure Support Trial: No  Reason for No Pressure Support Trial: Pt is on pressors  ETT appearance on chest x-ray: In appropriate place    Respiratory Therapy  Patient remains intubated on mechanical ventilator with the following settings:    Vent Mode: CMV/AC  (Continuous Mandatory Ventilation/ Assist Control)  FiO2 (%): 60 %  Resp Rate (Set): 15 breaths/min  Tidal Volume (Set, mL): 450 mL  PEEP (cm H2O): 5 cmH2O  Resp: 21    Temp: 97.8  F (36.6  C) Temp src: Temporal   Pulse: 74   Resp: 21 SpO2: 93 % O2 Device: Mechanical Ventilator      BS were coarse and diminished. Suctioned small amounts of red streaked thick secretions through the ETT.  Will continue to follow and assess the patient's respiratory needs.    Yelena Grimes, RT  5/31/2023 2:17 PM

## 2023-05-31 NOTE — PLAN OF CARE
Goal Outcome Evaluation: No sedation. More restless today. Moves arms and legs. Coughing and fighting vent at times. Does not follow commands. MRI today.    Full vent support. O2 increased to 60%. Min sputum suctioned via ETT.     TF at goal. Rectal tube with liquid stool.     CRRT Goal -100 Min pressors to achieve goal. Guerra with scant UOP.  (Temp sensing guerra removed for MRI)    Skin with mult bruising. Weeping areas. Sanguinous drng and blistering from groin sites.     Freq lab draws. Electrolyte replacement.

## 2023-05-31 NOTE — PROGRESS NOTES
Assisted with transport of pt from ICU to MRI on the transport vent without any complications.    Yelena Grimes, RT on 5/31/2023 at 6:42 PM

## 2023-05-31 NOTE — PROGRESS NOTES
Teresa De Santiago is a 73 year old female with PB cirrhosis admitted 5/22 with septic shock seconday to e coli bacteremia and SBP. Remains in multi-organ failure      96  RR 24  HR 80 120/50 on norepinephrine 0.02   Briefly moves lips to stimulation, does not follow commands or open eyes. Does move both hands slightly spontaneous but does not withdraw to pain.   Conjunctival edema and Jaundiced  ETT ok  Lungs clear   H- RR w/o m  Abdomen slightly distended  Guerra draining dark urine  Diffuse anasarca, weeping skin tears. ecchymoses scattered all over.  Less edema in left hand    HD Catheter in left groin with weeping  No cyanosis.  Feet are warm        Neuro: encephalopathy seconday to infection plus liver failure.  EEG c/w metabolic encephalopathy / Fentanyl drip stopped 5 days ago.  CT showed no acute intracranial pathology but neuro recommending MRI to detect small strokes   P: continue off sedation  MRI brain    Pulm: Ventilator d 11. CMV 15 x 320  PEEP 5  Ve 8.2. 40%    Breathing above set ventilator rate and TVs are 430 .   P: increase TV to 450  to match patient effort      CV: Septic shock from e coli bacteremia. Hydrocortisone 50 q 6 for shock. Echo normal on 5/23  No arrhythmias. Remains on norepinephrine but lower doses and off vasopressin and able to pull 180-200 per hour net negative on CRRT.     P:  wean norepinephrine as tolerated   Decrease HC to 25 q 6     GI: PBC followed at [facility]. Not a transplant candidate.  Increasing bilirubin c/w decompensated cirrhosis, higher than yesterday. And coagulation parameters remain abnormal c/w poor synthetic function,   P: continue ursodiol    Tube feeds at 45--goal.     Renal: DANDRE,  50-80 ml/d urinary output.  CRRT since admission. 150 200 ml/hr  net negative now but remains anasarca but weight now below admission weight.  .  P: unable to remove guerra because of inaccurate bladder scan results   Continue CRRT until off pressors     Endo: Glucose levels ok . On  Tigre for thyroid dysfunction.    ID: SBP.  E. Coli bacteremia. .Zosyn d 10--stop   Start prophylaxis SBP with enteral FQ --cipro 500 /d    Heme: CLL plus liver and sepsis related DIC.  Not actively bleeding. Platelets remains low at 31 and fbrinogen low and decreasing c/w so needing cryo replacement Does  Have recent dx of  CLL but not treated.    P: 1  5 pack of cryo today     PPx: VTE, GI,    35 min of critical care time spent by me in caring for this patient exclusive of procedures.

## 2023-05-31 NOTE — PROGRESS NOTES
PALLIATIVE CARE SOCIAL WORK Progress Note   Location: [facility]    PCSW reached out to spouse Yomi to check in/offer support. PCSW missed him in with Teresa, left a vm on his phone. Encouraged to call Palliative Care for support and provided contact information.     Plan: Palliative care remains involved.      Ira Castillo [facility]  [facility], Palliative Care  Securely message with the [employer] Web Console (learn more here)  Or please use Palliative Team Pager

## 2023-05-31 NOTE — PROGRESS NOTES
[facility]  Hospitalist Progress Note  Troy Diggs MD 05/31/23    Reason for Stay (Diagnosis): Septic shock, renal failure, respiratory failure, E. coli bacteremia, DIC         Assessment and Plan:      Summary of Stay: Teresa De Santiago is a 73 year old female with history of primary biliary cirrhosis diagnosed 10 years ago, esophageal varices, CLL, anemia, thrombocytopenia, and previous nephrectomy who was admitted on 5/22/2023 after being called back for positive blood cultures for E. coli after initially presenting the day before with malaise and abdominal pain for a few days.    In the ER she received 2 L of IV fluids for hypotension.  She was quite hypoglycemic requiring D50.  She was started on norepinephrine and admitted to the ICU after paracentesis was done showing cloudy and yellow fluid.  She was empirically started on IV vancomycin and IV Zosyn.  She continued to worsen after admission requiring 3 pressors, IV stress dose hydrocortisone, and she had progressive renal failure.  Nephrology was consulted and she was started on CRRT.  She had worsening anemia and thrombocytopenia from baseline along with a low fibrinogen concerning for DIC.  Oncology was consulted and she has received multiple transfusions of cryoprecipitate, FFP, PRBCs, and platelets.  Multiple blood cultures and her ascites fluid is positive for E. coli.    She has had persistent encephalopathy despite holding all sedation.  Noncontrast head CT was unremarkable.  EEG did not show any seizure focus, but generalized slowing consistent with encephalopathy.  Neurology was consulted and recommends a brain MRI which is being done today.  She has had slow improvement in her hypotension is now on very low-dose norepinephrine.  CRRT continues and we are now able to perform UF for her significant anasarca, starting IV albumin to assist with this.  LFTs progressively worsening while here.  IV Zosyn now discontinued after 10 days of  antibiotics and started on oral ciprofloxacin SBP prophylaxis.    Problem List/Assessment and Plan:   Septic shock  E. coli bacteremia  SBP: Primary symptoms of malaise and abdominal pain for a few days.  Multiple blood cultures and her ascites fluid cultures positive for E. coli consistent with SBP.  Septic shock with renal failure requiring 3 pressors at one point.  -Continue to wean norepinephrine, currently on very low-dose  -Day 10 of IV Zosyn, discontinued today 5/31  -Start daily oral ciprofloxacin for SBP prophylaxis  -Decrease IV hydrocortisone from 50 mg to 25 mg every 6 hours    Persistent high leukocytosis  CML  Chronic anemia  Chronic thrombocytopenia  DIC  Fixed coagulopathy: She has required multiple transfusions of cryoprecipitate, FFP, PVCs, and platelets for likely DIC with ongoing low fibrinogen and anemia and thrombocytopenia from baseline.  Received PRBCs 5/24 and 5/25; cryoprecipitate 5/23, 5/24, 5/26, 5/28, 5/30, 5/31; platelets 5/24, 5/26, 5/28, 5/30; FFP 5/28.  Also, has a persistent leukocytosis above 40 in the setting of CML although admission WC count was only 20.  She has been receiving stress dose hydrocortisone which may be contributing.  INR persistently elevated 2.5 in part secondary to fixed coagulopathy from liver disease and also DIC.  -Trend CBC with weaning hydrocortisone  -Transfuse cryoprecipitate today for for retention of 106  -Check CBC, INR, fibrinogen tomorrow  -Having some loose stools with rectal tube in place, but no fevers.  Consider C. difficile PCR    Metabolic and septic encephalopathy: Depressed level of consciousness and ongoing confusion secondary to both septic and metabolic encephalopathy with both liver and renal failure.  Ammonia level not elevated.  Frequent liquid stools via rectal tube.  EEG did not show any seizure activity, just general encephalopathic slowing.  -Avoid sedating medications on vent  -Slowly improving and having some spontaneous movement  now  -Neurology consulted and brain MRI recommended which we will attempt today now that blood pressure is significantly improved    Primary biliary cirrhosis  History esophageal varices  Anasarca  Hypoalbuminemia: PBC diagnosed 10 years ago and follows with [facility] hepatology service.  Per her spouse she was being considered for a living donor transplant at the end of last year, but based on risk benefit and her current quite good quality of life they opted to avoid transplant and the risk that this carries especially with transplant medications.  She is chronically on ursodiol as well as nadolol 40 mg at bedtime, furosemide 20 mg every MWF, omeprazole 40 mg daily, and spironolactone 50 mg daily.  LFTs worsening here in the setting of septic shock and renal failure.  Bilirubin now climbing to 13.  She has hypoalbuminemia and elevated INR.  -Ursodiol restarted 5/30  -Daily CMP    Lactic acidosis: Persistent lactic acidosis while here in part due to sepsis, renal failure, and liver failure.  Clinically improving from an infection standpoint and removing fluid now.  Not trending lactic acid unless clinical change.    DANDRE on CKD stage II  Anasarca  History of nephrectomy  NAGMA: Previous nephrectomy for nonmalignant mass.  Baseline creatinine 1.1-1.2.  Admission creatinine 2.2 here and progressively worsening and then became an uric secondary to septic shock in the setting of liver failure.  Progressive anasarca secondary to sepsis, hypoalbuminemia, renal failure.  -Nephrology consulted  -Continuing CRRT with UF as able secondary to anasarca  -Nephrology added 25% IV albumin every 6 hours to help mobilize fluid    Hypothyroidism: Resume thyroid replacement.    Hypokalemia: Replacing per CRRT protocol.      DVT Prophylaxis: Pneumatic Compression Devices  Stress ulcer prophylaxis: P.o. Protonix 40 mg daily  Code Status: Full Code.  Palliative care consulted during admission  Lines: ET tube, Cuevas catheter, OG, rectal tube,  "right IJ CVC, left femoral HD catheter, left femoral arterial line  FEN: Tube feeds, dietitian consulted  Discharge Dispo: TBD  Estimated Disch Date / # of Days until Disch: Remains critical in the ICU on CRRT and intubated.  Anticipate multiple day hospitalization.  Prognosis guarded.    Clinically Significant Risk Factors          # Hypocalcemia: Lowest iCa = 4.3 mg/dL in last 2 days, will monitor and replace as appropriate     # Hypoalbuminemia: Lowest albumin = 2.3 g/dL at 5/22/2023  8:41 AM, will monitor as appropriate  # Coagulation Defect: INR = 2.53 (Ref range: 0.85 - 1.15) and/or PTT = 46 Seconds (Ref range: 22 - 38 Seconds), will monitor for bleeding  # Thrombocytopenia: Lowest platelets = 20 in last 2 days, will monitor for bleeding          # Obesity: Estimated body mass index is 31.66 kg/m  as calculated from the following:    Height as of 5/21/23: 1.549 m (5' 1\").    Weight as of this encounter: 76 kg (167 lb 8.8 oz).                       Interval History (Subjective):      Some spontaneous movement, but not following commands.  Pressors weaned down to just 0.02 norepinephrine.  Able to remove 3.8 L of fluid via CRRT over 24.  Minimal urine output.  275 stool output.  Currently afebrile, but significant persistent high leukocytosis.  Brain MRI today per neurology recommendations.                  Physical Exam:      Last Vital Signs:  /60 (BP Location: Right leg)   Pulse 74   Temp 97.8  F (36.6  C)   Resp 21   Wt 76 kg (167 lb 8.8 oz)   SpO2 93%   BMI 31.66 kg/m        Intake/Output Summary (Last 24 hours) at 5/31/2023 1506  Last data filed at 5/31/2023 1415  Gross per 24 hour   Intake 2255.08 ml   Output 4253 ml   Net -1997.92 ml       Constitutional: Intubated  Eyes: sclera yellow  HEENT: ET tube and OG present  Respiratory: Intubated.  Anterior lungs clear without focal crackles or wheeze.    Cardiovascular: RRR, no murmur appreciated  GI: Mildly distended, bowel sounds present "   Genitourinary: Cuevas catheter with scant dark urine output  Skin: Jaundiced  Musculoskeletal/extremities: 2-3+ bilateral lower extremity pitting edema  Neurologic: Some spontaneous movement, but not following commands  Psychiatric: calm currently         Medications:      All current medications were reviewed with changes reflected in problem list.         Data:      All new lab and imaging data were personally reviewed.   Labs:  Recent Labs   Lab 05/31/23  1314 05/31/23  0515 05/30/23 1956   WBC 47.9* 40.9* 39.0*   HGB 8.4* 8.1* 7.9*   HCT 25.1* 24.8* 23.8*   MCV 98 98 98   PLT 34* 31* 39*     Recent Labs   Lab 05/31/23  1314 05/31/23  1212 05/31/23  0815 05/31/23  0515 05/30/23 2354 05/30/23 1956 05/30/23  0835 05/30/23  0404 05/29/23  0802 05/29/23  0401     --   --  137  137  --  138   < > 138  138   < > 138   POTASSIUM 4.5  --   --  4.1  4.1  --  4.0   < > 4.1  4.1   < > 4.5   CHLORIDE 103  --   --  103  103  --  103   < > 104  104   < > 103   CO2 20*  --   --  20*  20*  --  20*   < > 21*  21*   < > 21*   ANIONGAP 14  --   --  14  14  --  15   < > 13  13   < > 14   * 143* 146* 168*  168*   < > 176*   < > 188*  188*   < > 173*   BUN 29.0*  --   --  27.7*  27.7*  --  30.1*   < > 24.7*  24.7*   < > 21.8   CR 0.88  --   --  0.90  0.90  --  0.96*   < > 0.90  0.90   < > 0.89   GFRESTIMATED 68  --   --  66  66  --  61   < > 66  66   < > 67   SONYA 8.2*  --   --  7.9*  7.9*  --  8.3*   < > 7.7*  7.7*   < > 7.6*   MAG 2.2  --   --  2.2  --  2.2   < > 2.2   < > 2.2   PHOS 4.0  --   --  4.2  --  4.2   < > 4.2   < > 4.5   PROTTOTAL  --   --   --  4.7*  --   --   --  4.4*  --  4.6*   ALBUMIN 2.9*  --   --  2.8*  2.8*  --  2.9*   < > 2.7*  2.7*   < > 2.9*   BILITOTAL  --   --   --  13.9*  --   --   --  11.9*  --  11.6*   ALKPHOS  --   --   --  194*  --   --   --  177*  --  183*   AST  --   --   --  68*  --   --   --  62*  --  65*   ALT  --   --   --  53*  --   --   --  46*  --  43*    <  > = values in this interval not displayed.      Imaging:   Brain MRI pending      Troy Diggs MD

## 2023-05-31 NOTE — PROGRESS NOTES
[facility] ICU VENTILATOR RESPIRATORY NOTE    Date of Admission: 5/22/23    Date of Intubation (most recent): 5/22/23    Reason for Mechanical Ventilation: Respiratory Failure    Number of Days on Mechanical Ventilation: 10    Met Criteria for Pressure Support Trial: No    Overnight Events: Pt requiring increase in O2 from 35-45%    Vent Mode: CMV/AC  (Continuous Mandatory Ventilation/ Assist Control)  FiO2 (%): 45 %  Resp Rate (Set): 15 breaths/min  Tidal Volume (Set, mL): 320 mL  PEEP (cm H2O): 5 cmH2O  Resp: 24    Breath Sounds: coarse/diminished  Secretions: small to moderate, yellow/orange/blood-tinged    Plan: Will continue to monitor and assess the pt's current respiratory status and needs.      RT Evon on 5/31/2023 at 5:34 AM

## 2023-05-31 NOTE — PLAN OF CARE
Neuro: no purposeful movements, withdraws from pain, Pupils 2-3 sluggish  Cardiac:Tele SR 70's, BP stable with  Reduction in need for pressors  Pulm: increase O2 needs, LS coarse, sputum tan/blood streaked  GI:abd distended, tolerating TF, RT with loose/watery brown stool  : continues CRRT, guerra with little output  ID:afebrile, continues on zosyn  Skin:jaundiced, weepy, multiple bruises, skin tears, concerned about blisters from dressing near HD access and artline  Access: R internal jugular, L femoral artline, L femoral Hemodialysis line    Plan:continue to monitor off sedation to see if improved neuro status, continue to attempt to wean pressors off    CRRT STATUS NOTE          Pressures WNL:  YES  Filter Status:  WDL    Problems Reported/Alarms Noted:  yes      ASSESSMENT:  Patient Net Fluid Balance:       Urine status: minimal, tea colored    Vital Signs: Temp:  [96.8  F (36  C)-98.3  F (36.8  C)] 97  F (36.1  C)  Pulse:  [68-99] 78  Resp:  [15-29] 24  BP: (129)/(60) 129/60  MAP:  [57 mmHg-96 mmHg] 65 mmHg  Arterial Line BP: ()/(34-65) 102/44  FiO2 (%):  [35 %-45 %] 45 %  SpO2:  [88 %-100 %] 92 %       Temperature managed with   Blood warmer NO  Forced warm air YES  Blankets YES    Vasoactive gtts? Yes         Goals of Therapy:  Net negative 100 per hour         INTERVENTIONS/Significant Events:   (lines reversed, electrolyte replacement, other)pulled 100-120 ml/hr, no replacement needed tonight    PLAN/Meeting Goal:  Continue with CRRT per plan of care. Fluid removal per plan of care. Due for filter change (q 72hrs)  Due at 6/2/2023 1300 or sooner if indicated.

## 2023-06-01 NOTE — PROGRESS NOTES
Updated patients  and son at  bedside. He signed consent for procedures.  I informed them that there are signs of continuing worsening despite full support for almost 2 weeks now.

## 2023-06-01 NOTE — PROGRESS NOTES
CT shows RML infiltrate with small area of cavitation and collapse and consolidation in LLL.  If the RML cavitary is aspergillus or other fungus this would be fatal.      Has moderate ascites so will perform small volume paracentesis.

## 2023-06-01 NOTE — PHARMACY-VANCOMYCIN DOSING SERVICE
Pharmacy Vancomycin Initial Note  Date of Service 2023  Patient's  1950  73 year old, female    Indication: Healthcare-Associated Pneumonia    Current estimated CrCl = Estimated Creatinine Clearance: 49.1 mL/min (based on SCr of 0.91 mg/dL).    Creatinine for last 3 days  2023:  7:56 PM Creatinine 0.88 mg/dL  2023:  4:04 AM Creatinine 0.90 mg/dL;  4:04 AM Creatinine 0.90 mg/dL; 12:10 PM Creatinine 0.95 mg/dL;  7:56 PM Creatinine 0.96 mg/dL  2023:  5:15 AM Creatinine 0.90 mg/dL;  5:15 AM Creatinine 0.90 mg/dL;  1:14 PM Creatinine 0.88 mg/dL;  7:46 PM Creatinine 0.94 mg/dL  2023:  4:17 AM Creatinine 0.93 mg/dL;  4:17 AM Creatinine 0.93 mg/dL; 12:33 PM Creatinine 0.91 mg/dL    Recent Vancomycin Level(s) for last 3 days  No results found for requested labs within last 3 days.      Vancomycin IV Administrations (past 72 hours)                   vancomycin (VANCOCIN) 1,500 mg in 0.9% NaCl 250 mL intermittent infusion (mg) 1,500 mg New Bag 23 0858                Nephrotoxins and other renal medications (From now, onward)    Start     Dose/Rate Route Frequency Ordered Stop    23 0803  vancomycin place nicole - receiving intermittent dosing         1 each Intravenous SEE ADMIN INSTRUCTIONS 23 0804      23 0530  vasopressin 0.2 units/mL in NS (PITRESSIN) standard conc infusion         2.4 Units/hr  12 mL/hr  Intravenous CONTINUOUS 23 0505      23  norepinephrine (LEVOPHED) 16 mg in  mL infusion MAX CONC CENTRAL LINE         0.01-0.6 mcg/kg/min × 72.1 kg  0.7-40.6 mL/hr  Intravenous CONTINUOUS 23 193            Contrast Orders - past 72 hours (72h ago, onward)    None          Plan:  1. Give IV Vancomycin load: 1500mg x1 today, then to intermittent dosing.  2. Vancomycin monitoring method: Renal Replacement Therapy (CRRT).  3. Pharmacy will check vancomycin levels tomorrow am due to CRRT being interrupted today 2023 couple of  times.    Johanne Jones, [facility]

## 2023-06-01 NOTE — PROGRESS NOTES
Called by bedside nursing for escalating doses of norepinephrine.  Patient's vasopressin was restarted this a.m. to maintain mean arterial pressure greater than 65.  Patient also seems to be more dyssynchronous with the ventilator and was withdrawing to stimulus.  This was confirmed by viewing and on the telemetry camera.  Started low-dose propofol to see if that would help with vent dyssynchrony.  Patient also had increasing oxygen requirements, chest x-ray showed right lower lobe infiltrate with ET tube in good position, no evidence of pneumothorax.  The increased oxygen requirements may be also secondary to vent dyssynchrony.  We will see how propofol treats the vent dyssynchrony in the meantime increase PEEP to 10.    Konstantin Engel MD  Critical Care Medicine

## 2023-06-01 NOTE — PROGRESS NOTES
CLINICAL NUTRITION SERVICES - REASSESSMENT NOTE     Nutrition Prescription      Malnutrition Status:    % Intake: Decreased intake does not meet criteria--meeting needs with EN, good baseline nutrition status  % Weight Loss: None noted  Subcutaneous Fat Loss: None observed  Muscle Loss: Thoracic region (clavicle, deltoid, trapezius):  Moderate trapezius--suspect this may be age-related  Fluid Accumulation/Edema: Severe  Malnutrition Diagnosis: Patient does not meet two of the established criteria necessary for diagnosing malnutrition but is at risk for malnutrition     EVALUATION OF THE PROGRESS TOWARD GOALS   Nutrition Support:      Access: OGT    Formula/Rate/Provisions: Vital High Protein @ goal of 45ml/hr (1080ml/day) provides: 1080 kcals, 94 g PRO, 902 ml free H20, 119 g CHO, and 0 g fiber daily.    Flushes: H2O- 30 ml q 4 hrs    Modulars: Prosource TF- 1 pkt tid= 240 kcal/60 g protein    Propofol at current rate provides 199 kcal/day    Total energy/protein provisions: 1519 kcal (~24 kcal/kg)/154 g protein (2.4 g/kg) per DW 64 kg    Intake/Tolerance:  3-day average intake (since TF increased to goal) of 1080 ml which meets 100% of prescribed volume     NEW FINDINGS   General:   - bronchoscopy for new VAP  - small ascites--diagnostic paracentesis today  - increased WBC- abdominal/pelvic CT    Weight: pt continues to have anasarca though now able to remove some fluid through CRRT  Date/Time Weight Weight Method   06/01/23 0510 73.7 kg (162 lb 7.7 oz) Bed scale   05/31/23 0400 76 kg (167 lb 8.8 oz) Bed scale   05/30/23 0400 79.5 kg (175 lb 4.3 oz) Bed scale   05/29/23 0000 83.3 kg (183 lb 10.3 oz) Bed scale   05/28/23 0630 85.5 kg (188 lb 7.9 oz) Bed scale   05/27/23 0400 87.4 kg (192 lb 10.9 oz) Bed scale   05/26/23 0400 87.8 kg (193 lb 9 oz) Bed scale   05/25/23 0600 88.9 kg (195 lb 15.8 oz) Bed scale   05/24/23 0615 85.4 kg (188 lb 4.4 oz) Bed scale   05/23/23 0400 78.1 kg (172 lb 2.9 oz) --     Labs:   - Na  135  - BUN 33.8  - lactate 3.1 (up from yesterday)  - WBC 52.1    GI:  yesterday, 325 so far today    Skin: RN expressed concern regarding groin line, pt also notably has blisters and weeping d/t anasarca    Meds:     albumin human  12.5 g Intravenous Q6H     B and C vitamin Complex with folic acid  5 mL Per Feeding Tube Daily     hydrocortisone sodium succinate PF  25 mg Intravenous Q6H     insulin aspart  1-4 Units Subcutaneous Q4H     meropenem  1 g Intravenous Q8H     pantoprazole  40 mg Per Feeding Tube QAM AC     protein modular  1 packet Per Feeding Tube TID     thyroid  90 mg Oral or Feeding Tube Daily     ursodiol  500 mg Oral BID     vancomycin place nicole - receiving intermittent dosing  1 each Intravenous See Admin Instructions        dextrose       CRRT replacement solution 5,000 mL (05/30/23 1207)     - MEDICATION INSTRUCTIONS -       norepinephrine 0.07 mcg/kg/min (06/01/23 1000)     CRRT replacement solution 200 mL/hr (06/01/23 0050)     CRRT replacement solution 5,000 mL (05/30/23 1206)     propofol 20 mcg/kg/min (06/01/23 1355)     sodium chloride 5 mL/hr at 06/01/23 1000     vasopressin Stopped (06/01/23 0553)        ASSESSED NUTRITION NEEDS PER APPROVED PRACTICE GUIDELINES  Dosing Weight: 64 kg (most recent true dry weight?)  Estimated Energy Needs: 2553-7043 kcals/day (20 - 25 kcals/kg)  Justification: Vented  Estimated Protein Needs: 128-160+ grams protein/day (2-2.5+ grams of pro/kg)  Justification: Hypercatabolism with critical illness, CRRT  Estimated Fluid Needs: Per provider pending fluid status    MALNUTRITION  % Intake: Decreased intake does not meet criteria  % Weight Loss: None noted  Subcutaneous Fat Loss: None observed  Muscle Loss: Thoracic region (clavicle, deltoid, trapezius):  Moderate trapezius--suspect this may be age-related  Fluid Accumulation/Edema: Severe  Malnutrition Diagnosis: Patient does not meet two of the established criteria necessary for diagnosing  malnutrition but is at risk for malnutrition    Previous Goals   Total avg nutritional intake to meet a minimum of 20 kcal/kg and 2 g PRO/kg daily (per dosing wt 64 kg) within 5-7 days or as able pending hemodynamic stability  Evaluation: Met    Previous Nutrition Diagnosis  Inadequate oral intake related to respiratory needs requiring NPO as evidenced by need for nutrition support    Evaluation: No change    CURRENT NUTRITION DIAGNOSIS  Inadequate oral intake related to respiratory needs requiring NPO as evidenced by need for nutrition support      INTERVENTIONS  Implementation  Enteral Nutrition - continue current EN orders, patient is meeting estimated needs for phase of illness, respiratory status and CRRT  Collaboration and Referral of Nutrition care: Patient discussed during IDT rounds this morning    Goals  Total avg nutritional intake to meet a minimum of 20 kcal/kg and 2 g PRO/kg daily (per dosing wt 64 kg).    Monitoring/Evaluation  Progress toward goals will be monitored and evaluated per protocol.    Stephie Marquez, RD, LD, [facility]  Pager - 3rd floor/ICU: [phone]  Pager - All other floors: [phone]  Pager - Weekend/holiday: [phone]  Office: [phone]

## 2023-06-01 NOTE — PLAN OF CARE
Problem: Enteral Nutrition  Goal: Safe, Effective Therapy Delivery  Outcome: Progressing  Goal: Feeding Tolerance  Outcome: Progressing   Goal Outcome Evaluation: Patient at goal rate and meeting estimated needs for phase of illness, respiratory status and CRRT. RD will continue to follow closely.

## 2023-06-01 NOTE — PROGRESS NOTES
[facility] ICU VENTILATOR RESPIRATORY NOTE     Date of Admission: 05/22/2023     Date of Intubation (most recent): 05/22/2023     Reason for Mechanical Ventilation: Respiratory Failure     Number of Days on Mechanical Ventilation: 11     Met Criteria for Pressure Support Trial: No     Reason for No Pressure Support Trial:Fio2 >50%      ABG Results:   Recent Labs   Lab 05/29/23  0603 05/28/23  1901 05/28/23  0633 05/27/23 2010   PH 7.42 7.44 7.44 7.45   PCO2 37 36 37 37   PO2 115* 65* 70* 62*   HCO3 24 24 25 26   O2PER 50 50 50 45     Vent Mode: CMV/AC  (Continuous Mandatory Ventilation/ Assist Control)  FiO2 (%): (S) 100 %  Resp Rate (Set): 15 breaths/min  Tidal Volume (Set, mL): 450 mL  PEEP (cm H2O): 5 cmH2O  Resp: 27    BS coarse. Suctioned for small red-streaked tan thick secretions. Will continue to assess and monitor.    Missael Groves RT on 6/1/2023 at 4:21 AM    PEEP increased to +10    RT Trini on 6/1/2023 at 5:20 AM

## 2023-06-01 NOTE — PLAN OF CARE
Goal Outcome Evaluation:      Plan of Care Reviewed With: patient    Overall Patient Progress: decliningOverall Patient Progress: declining     ICU End of Shift Summary.  For vital signs and complete assessments, please see documentation flowsheets.     Neuro: PERRLA. Does not follow commands. Twitching extremities- propofol started  Cardiac: tele SR, BP labile. Requiring levo low dose up to 0.3 briefly. Vaso added but not started as levo requirements decreased.  Resp: lungs coarse. Vent supported. Increased peep to 10 per MD, on 100% fio2.  GI: tolerating TF at goal. Rectal tube in place  : anuric, no guerra in, bladder scan <100  Skin: weeping, jaundiced, petechiae. See flowsheets  Lines: cvc, HD, a line  Drips: levo, prop    Major Shift Events:     Desaturation -> CXR, increased peep to 10, fio2 100    Twitching/moving arms and legs inwards, tachypneic to upper 20s and into 30 and pulling large volumes MD viewed via camera, started low dose propofol for vent synchrony    Tele stroke consult today    Plan (Upcoming Events): tele stroke consult today, continue to wean pressors as able  Discharge/Transfer Needs: tbd     Bedside Shift Report Completed : y  Bedside Safety Check Completed: y    CRRT STATUS NOTE          Pressures WNL:  YES  Filter Status:  WDL    Problems Reported/Alarms Noted:  extremely negative access pressure alarm, flow errors      ASSESSMENT:  Patient Net Fluid Balance:       Urine status: anuric, bladder scan <100  Vital Signs: Temp:  [97.4  F (36.3  C)-98.7  F (37.1  C)] 98.4  F (36.9  C)  Pulse:  [65-89] 81  Resp:  [9-31] 28  MAP:  [55 mmHg-145 mmHg] 74 mmHg  Arterial Line BP: ()/() 110/51  FiO2 (%):  [40 %-100 %] 100 %  SpO2:  [79 %-100 %] 100 %       Temperature managed with   Blood warmer YES  Forced warm air NO  Blankets NO    Vasoactive gtts? levo        Goals of Therapy:  net negative 100/hr. Unable to meet goal multiple hours d/t labile pressures.        INTERVENTIONS/Significant Events:   Lines reversed x2, calcium replaced    PLAN/Meeting Goal:  Continue with CRRT per plan of care. Fluid removal per plan of care. Due for filter change (q 72hrs)  Due at 6/4 0100 or sooner if indicated.

## 2023-06-01 NOTE — CONSULTS
[facility]    Stroke Consult Note    Reason for Consult:  new subcortical infarct on MRI    Chief Complaint: Abnormal Labs       HPI  Teresa De Santiago is a 73 year old female with PMHx significant for primary biliary cirrhosis, esophageal varices, CLL, anemia, thrombocytopenia, nephrectomy. She was admitted on 5/22 after blood cultures showed e coli bacteremia. She had been seen the day prior for malaise and abdominal pain.   Ascitic fluid also cultured out e coli. She required pressors and progressed to renal failure. She was intubated for respiratory failure. She is now on CRRT. Her hospital course has also been complicated by anemia, thrombocytopenia, low fibrinogen concerning for DIC.   She remained encephalopathic despite weaning sedation. CT was unremarkable. MRI shows a tiny superior frontal gyrus infarct that would not explain her encephalopathy.     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 No further stroke evaluation is recommended, so we will sign  "off. Please contact us with any additional questions.    Agata Ayala NP  Vascular Neurology    To page me or covering stroke neurology team member, click here: [facility]COM  Choose \"On Call\" tab at top, then select \"NEUROLOGY/ALL SITES\" from middle drop-down box, press Enter, then look for \"stroke\" or \"telestroke\" for your site.    _____________________________________________________    Clinically Significant Risk Factors          # Hypocalcemia: Lowest iCa = 4.3 mg/dL in last 2 days, will monitor and replace as appropriate     # Hypoalbuminemia: Lowest albumin = 2.3 g/dL at 5/22/2023  8:41 AM, will monitor as appropriate  # Coagulation Defect: INR = 2.97 (Ref range: 0.85 - 1.15) and/or PTT = 49 Seconds (Ref range: 22 - 38 Seconds), will monitor for bleeding  # Thrombocytopenia: Lowest platelets = 31 in last 2 days, will monitor for bleeding          # Obesity: Estimated body mass index is 30.7 kg/m  as calculated from the following:    Height as of 5/21/23: 1.549 m (5' 1\").    Weight as of this encounter: 73.7 kg (162 lb 7.7 oz).             Past Medical History   Past Medical History:   Diagnosis Date     Cirrhosis, primary biliary (H)      Thyroid disease 20's     Past Surgical History   Past Surgical History:   Procedure Laterality Date     GALLBLADDER SURGERY       MASTECTOMY  1994     NEPHRECTOMY       Medications   Home Meds  Prior to Admission medications    Medication Sig Start Date End Date Taking? Authorizing Provider   Fish Oil-Cholecalciferol (OMEGA-3 FISH OIL/VITAMIN D3 PO) Take by mouth daily   Yes Unknown, Entered By History   furosemide (LASIX) 20 MG tablet Take 20 mg by mouth Every Mon, Wed, Fri Morning   Yes Unknown, Entered By History   nadolol (CORGARD) 20 MG tablet Take 40 mg by mouth At Bedtime   Yes Reported, Patient   omeprazole (PRILOSEC) 40 MG DR capsule Take 1 capsule (40 mg) by mouth daily for 30 days 5/17/23 6/16/23 Yes Finn Coats MD   QC ZINC PO Take by mouth 2 times daily   " Yes Unknown, Entered By History   spironolactone (ALDACTONE) 50 MG tablet Take 50 mg by mouth daily   Yes Unknown, Entered By History   thyroid (ARMOUR) 90 MG tablet Take 90 mg by mouth daily   Yes Reported, Patient   ursodiol (ACTIGALL) 500 MG tablet Take 500 mg by mouth 2 times daily   Yes Unknown, Entered By History   VITAMIN A PO Take by mouth daily   Yes Unknown, Entered By History       Scheduled Meds    albumin human  12.5 g Intravenous Q6H     B and C vitamin Complex with folic acid  5 mL Per Feeding Tube Daily     hydrocortisone sodium succinate PF  25 mg Intravenous Q6H     insulin aspart  1-4 Units Subcutaneous Q4H     meropenem  1 g Intravenous Q8H     micafungin  100 mg Intravenous Q24H     pantoprazole  40 mg Per Feeding Tube QAM AC     protein modular  1 packet Per Feeding Tube TID     thyroid  90 mg Oral or Feeding Tube Daily     ursodiol  500 mg Oral BID     vancomycin place nicole - receiving intermittent dosing  1 each Intravenous See Admin Instructions       Infusion Meds    dextrose       CRRT replacement solution 5,000 mL (05/30/23 1207)     - MEDICATION INSTRUCTIONS -       norepinephrine 0.12 mcg/kg/min (06/01/23 1645)     CRRT replacement solution 200 mL/hr (06/01/23 0050)     CRRT replacement solution 5,000 mL (05/30/23 1206)     propofol 20 mcg/kg/min (06/01/23 1610)     sodium chloride 5 mL/hr at 06/01/23 1600     vasopressin Stopped (06/01/23 0553)       PRN Meds  sodium chloride 0.9%, sodium chloride 0.9%, calcium gluconate, calcium gluconate, dextrose, glucose **OR** dextrose **OR** glucagon, fentaNYL, magnesium sulfate, naloxone **OR** naloxone **OR** naloxone **OR** naloxone, - MEDICATION INSTRUCTIONS -, potassium chloride, prochlorperazine **OR** prochlorperazine **OR** prochlorperazine, sodium chloride (PF), sodium phosphate    Allergies   Allergies   Allergen Reactions     Contrast Dye Hives     Family History   Family History   Problem Relation Age of Onset     Cancer Mother       Heart Disease Mother      Osteoporosis Mother      Cerebrovascular Disease Mother      Seizure Disorder Mother      Thyroid Disease Mother      High cholesterol Other         sibling     Thyroid Disease Other         sibling     Social History   Social History     Tobacco Use     Smoking status: Never     Smokeless tobacco: Never   Substance Use Topics     Alcohol use: No     Alcohol/week: 0.0 standard drinks of alcohol     Drug use: No       Review of Systems   The 10 point Review of Systems is negative other than noted in the HPI or here.       PHYSICAL EXAMINATION   Temp:  [96.8  F (36  C)-98.4  F (36.9  C)] 97.8  F (36.6  C)  Pulse:  [64-89] 65  Resp:  [9-31] 23  BP: (115-124)/(52-62) 123/56  MAP:  [55 mmHg-145 mmHg] 56 mmHg  Arterial Line BP: ()/() 96/39  FiO2 (%):  [40 %-100 %] 60 %  SpO2:  [79 %-100 %] 92 %    Patient not seen on camera today; no physical exam performed     Dysphagia Screen  intubated    Imaging  I personally reviewed all imaging; relevant findings per HPI.    Labs Data   CBC  Recent Labs   Lab 06/01/23  1233 06/01/23 0417 05/31/23 1946   WBC 52.1* 55.2* 49.9*   RBC 2.16* 2.41* 2.37*   HGB 7.1* 7.9* 7.8*   HCT 21.7* 24.1* 23.5*   PLT 62* 35* 31*     Basic Metabolic Panel   Recent Labs   Lab 06/01/23  1553 06/01/23  1233 06/01/23  1206 06/01/23  0745 06/01/23 0417 05/31/23  2353 05/31/23 1946   NA  --  135*  --   --  137  137  --  136   POTASSIUM  --  4.1  --   --  4.7  4.7  --  4.6   CHLORIDE  --  102  --   --  103  103  --  103   CO2  --  19*  --   --  18*  18*  --  19*   BUN  --  33.8*  --   --  35.4*  35.4*  --  31.3*   CR  --  0.91  --   --  0.93  0.93  --  0.94   * 172* 157*   < > 160*  177*  177*   < > 148*  163*   SONYA  --  8.0*  --   --  8.2*  8.2*  --  8.2*    < > = values in this interval not displayed.     Liver Panel  Recent Labs   Lab 06/01/23  1233 06/01/23  0417 05/31/23  1946 05/31/23  1314 05/31/23  0515 05/30/23  1210 05/30/23  0404    PROTTOTAL  --  5.0*  --   --  4.7*  --  4.4*   ALBUMIN 3.1* 3.4*  3.4* 3.2*   < > 2.8*  2.8*   < > 2.7*  2.7*   BILITOTAL  --  16.4*  --   --  13.9*  --  11.9*   ALKPHOS  --  221*  --   --  194*  --  177*   AST  --  58*  --   --  68*  --  62*   ALT  --  48*  --   --  53*  --  46*    < > = values in this interval not displayed.     INR    Recent Labs   Lab Test 06/01/23  0417 05/31/23  0515 05/30/23  0404   INR 2.97* 2.53* 2.51*      Lipid Profile  No lab results found.  A1C  No lab results found.  Troponin  No results for input(s): CTROPT, TROPONINIS, TROPONINI, GHTROP in the last 168 hours.         I have personally spent 45 minutes personally reviewing neuroimaging and the patient's medical record, and writing a report.

## 2023-06-01 NOTE — PROGRESS NOTES
Renal Medicine Progress Note    SHORTHAND KEY FOR MY NOTES:  c = with, s = without, p = after, a = before, x = except, asx = asymptomatic, tx = transplant or treatment, sx = symptoms or symptomatic, cx = canceled or culture, rxn = reaction, yday = yesterday, nl = normal, abx = antibiotics, fxn = function, dx = diagnosis, dz = disease, m/h = melena/hematochezia, c/d/l/ha = cramping/dizziness/lightheadedness/headache, d/c = discharge or diarrhea/constipation, f/c/n/v = fevers/chills/nausea/vomiting, cp/sob = chest pain/shortness of breath, tbv = total body volume, rxn = reaction, tdc = tunneled dialysis catheter, pta = prior to admission, hd = hemodialysis, pd = peritoneal dialysis, hhd = home hemodialysis, edw = estimated dry wt         Assessment/Plan:     1.  Oliguric DANDRE.  Pt is on CRRT, but will need a 4h break from 1500-1900p due to nursing.  She had some issues overnight due to hypotension, which limited fluid removal for a while.  This occurred despite giving 25% alb.  She remains TBV due to third-spacing.  A.  Continue CRRT.   B.  We will attempt net 100 ml/h as able, without increasing pressor needs.  C.  If we continue to have trouble c fluid removal, we will need to have a family discussion.    2.  Resp failure.  Pt remains on the vent. She has a cavitary lesion seen on bronch today. The fluid removed didn't look good.  FIO2 is up to 60%.  Lung exam remains poor.  A.  Vent mgmt per ICU team.    3.  E. Coli sepsis syndrome + possible DIC.  She remains on norepi and broad abx.  WBC continues to rise steadily.    A.  Adjust pressors prn.    B.  Continue abx at proper dose for CRRT.  C.  Follow labs, clinically.    4.  Anemia / thrombocytopenia.  Hb and plts remain low, but fairly stable.  A.  Follow labs.    5.  Cirrhosis 2 PBC.  Pt's bili is rising acutely and INR is high.  Abd is more distended today.   A.  Monitor LFTs, clinically.    6.  FEN.  Electrolytes are ok x low Ca.  She is getting replacement.   She is still on TF.  A.  Check ICa daily and replace prn.  B.  Consider holding TF given abd distention.    7.  Code Status. Pt is full code.  If the liver fxn worsens and she has trouble c BP, then dialysis may not be possible.  Tried to find family 2x today, but they weren't in the room.  Will d/w them tmrw.    Case d/w Hong Diggs and Megan.        Interval History:     Unable.  Pt is intubated/sedated.          Medications and Allergies:       albumin human  12.5 g Intravenous Q6H     B and C vitamin Complex with folic acid  5 mL Per Feeding Tube Daily     hydrocortisone sodium succinate PF  25 mg Intravenous Q6H     insulin aspart  1-4 Units Subcutaneous Q4H     meropenem  1 g Intravenous Q8H     pantoprazole  40 mg Per Feeding Tube QAM AC     protein modular  1 packet Per Feeding Tube TID     thyroid  90 mg Oral or Feeding Tube Daily     ursodiol  500 mg Oral BID     vancomycin place nicole - receiving intermittent dosing  1 each Intravenous See Admin Instructions      Allergies   Allergen Reactions     Contrast Dye Hives          Physical Exam:     Vitals were reviewed   , Blood pressure 115/62, pulse 67, temperature 96.8  F (36  C), resp. rate 19, weight 73.7 kg (162 lb 7.7 oz), SpO2 100 %.  Wt Readings from Last 3 Encounters:   06/01/23 73.7 kg (162 lb 7.7 oz)   05/21/23 72.1 kg (159 lb)   05/16/23 68 kg (150 lb)     Intake/Output Summary (Last 24 hours) at 6/1/2023 1446  Last data filed at 6/1/2023 1400  Gross per 24 hour   Intake 2383.44 ml   Output 3309 ml   Net -925.56 ml     GENERAL APPEARANCE: intubated, sedated  HEENT:  eyes/ears/nose/neck grossly nl x + OG  RESP: + coarse rhonchi, vent sounds  CV: RRR, nl S1/S2   ABDOMEN: + distended, firm, grimaces to palpation  EXTREMITIES/SKIN: + jaundice, + anasarca  NEURO:  sedated  LINES:  + guerra, + RIJ 2-lumen, + L fem temp HD catheter, + rectal tube, + fem art line    Pt seen on CRRT.  Stable run.  Good BFR via L fem.  -100+ ml/h UF.         Data:     CBC  RESULTS:     Recent Labs   Lab 06/01/23  1233 06/01/23  0417 05/31/23 1946 05/31/23  1314 05/31/23 0515 05/30/23 1956   WBC 52.1* 55.2* 49.9* 47.9* 40.9* 39.0*   RBC 2.16* 2.41* 2.37* 2.55* 2.52* 2.42*   HGB 7.1* 7.9* 7.8* 8.4* 8.1* 7.9*   HCT 21.7* 24.1* 23.5* 25.1* 24.8* 23.8*   PLT 62* 35* 31* 34* 31* 39*     Basic Metabolic Panel:  Recent Labs   Lab 06/01/23  1233 06/01/23  1206 06/01/23  0745 06/01/23 0417 05/31/23 2353 05/31/23 1946 05/31/23  1612 05/31/23 1314 05/31/23 0815 05/31/23 0515 05/30/23 2354 05/30/23 1956   *  --   --  137  137  --  136  --  137  --  137  137  --  138   POTASSIUM 4.1  --   --  4.7  4.7  --  4.6  --  4.5  --  4.1  4.1  --  4.0   CHLORIDE 102  --   --  103  103  --  103  --  103  --  103  103  --  103   CO2 19*  --   --  18*  18*  --  19*  --  20*  --  20*  20*  --  20*   BUN 33.8*  --   --  35.4*  35.4*  --  31.3*  --  29.0*  --  27.7*  27.7*  --  30.1*   CR 0.91  --   --  0.93  0.93  --  0.94  --  0.88  --  0.90  0.90  --  0.96*   * 157* 148* 160*  177*  177* 148* 148*  163*   < > 149*   < > 168*  168*   < > 176*   SONYA 8.0*  --   --  8.2*  8.2*  --  8.2*  --  8.2*  --  7.9*  7.9*  --  8.3*    < > = values in this interval not displayed.     INR  Recent Labs   Lab 06/01/23  0417 05/31/23  0515 05/30/23  0404 05/29/23  0602   INR 2.97* 2.53* 2.51* 2.48*      Attestation:   I have reviewed today's relevant vital signs, notes, medications, labs and imaging.    Phillip Recinos MD  [facility] - Nephrology  [phone]

## 2023-06-01 NOTE — PROGRESS NOTES
[facility]  Hospitalist Progress Note  Troy Diggs MD 6/1/23    Reason for Stay (Diagnosis): Septic shock, renal failure, respiratory failure, E. coli bacteremia, DIC         Assessment and Plan:      Summary of Stay: Teresa De Santiago is a 73 year old female with history of primary biliary cirrhosis diagnosed 10 years ago, esophageal varices, CLL, anemia, thrombocytopenia, and previous nephrectomy who was admitted on 5/22/2023 after being called back for positive blood cultures for E. coli after initially presenting the day before with malaise and abdominal pain for a few days.    In the ER she received 2 L of IV fluids for hypotension.  She was quite hypoglycemic requiring D50.  She was started on norepinephrine and admitted to the ICU after paracentesis was done showing cloudy and yellow fluid.  She was empirically started on IV vancomycin and IV Zosyn.  She continued to worsen after admission requiring 3 pressors, IV stress dose hydrocortisone, and she had progressive renal failure.  Nephrology was consulted and she was started on CRRT.  She had worsening anemia and thrombocytopenia from baseline along with a low fibrinogen concerning for DIC.  Oncology was consulted and she has received multiple transfusions of cryoprecipitate, FFP, PRBCs, and platelets.  Multiple blood cultures and her ascites fluid is positive for E. coli.    She has had persistent encephalopathy despite holding all sedation.  Noncontrast head CT was unremarkable.  EEG did not show any seizure focus, but generalized slowing consistent with encephalopathy.  Neurology was consulted and recommends a brain MRI which was done and shows a small 3 mm acute to subacute CVA in the right superior frontal gyrus that is unlikely etiology for encephalopathy.  She continues to require low-dose norepinephrine.  CRRT continues and we are attempting to perform some UF for her significant anasarca and we have started IV albumin to assist with  this.  LFTs progressively worsening while here.      IV Zosyn was discontinued after 10 days of antibiotics on 5/31 and started on oral ciprofloxacin SBP prophylaxis, however her WBC count is rising to 52 and she is needing some increased pressor needs so infectious work-up repeated.  CT the abdomen pelvis shows part of the lung field which shows collapse of the left lower lobe and consolidation of the right middle lobe with some cavitation concerning for ongoing infection.  Underwent bronchoscopy 6/1 with multiple lab studies sent.  Underwent paracentesis 6/1 with labs sent as well, ~1,700 neutrophils.  She is started back on IV vancomycin, IV meropenem, and IV micafungin 6/1.      Problem List/Assessment and Plan:   Septic shock  E. coli bacteremia  SBP  Persistent high leukocytosis  Cavitary pneumonia: Primary symptoms of malaise and abdominal pain for a few days.  Multiple blood cultures and her ascites fluid cultures positive for E. coli consistent with SBP.  Septic shock with renal failure requiring 3 pressors at one point.  -Continues to need some norepinephrine, wean as able  -Received 10 days of IV Zosyn which was discontinued on 5/31 and started on oral ciprofloxacin SBP prophylaxis.  -Leukocytosis now up to 52 (not just lymphocytes on differential so this is very unlikely to be just from CML) so infectious work-up restarted with bronchoscopy with studies, paracentesis with studies neutrophils 1,700, blood cultures, C. Difficile and CT the abdomen pelvis that shows collapse of the left lower lobe and cavitary lesion with consolidation of the right middle lobe concerning for cavitary pneumonia.  May be due to aspiration pneumonia, Aspergillus, etc. in the differential.  -Started on IV vancomycin, IV meropenem, and IV micafungin 6/1  -IV hydrocortisone 25 mg every 6 hours    Persistent high leukocytosis  CML  Chronic anemia  Chronic thrombocytopenia  DIC  Fixed coagulopathy: She has required multiple  transfusions of cryoprecipitate, FFP, PVCs, and platelets for likely DIC with ongoing low fibrinogen and anemia and thrombocytopenia from baseline.  Received PRBCs 5/24 and 5/25; cryoprecipitate 5/23, 5/24, 5/26, 5/28, 5/30, 5/31, 6/1; platelets 5/24, 5/26, 5/28, 5/30, 6/1; FFP 5/28.  Also, has a persistent leukocytosis above 40 in the setting of CML although admission WC count was only 20.  She has been receiving stress dose hydrocortisone which may be contributing.  INR persistently elevated 2.5 in part secondary to fixed coagulopathy from liver disease and also DIC.  -Trend CBC with weaning hydrocortisone  -Transfuse cryoprecipitate and platelets today with plan for paracentesis   -Transfuse cryoprecipitate for fibrinogen 100 or less and platelet count 20-30  -Check CBC, INR, fibrinogen tomorrow  -Infectious work-up as above for rising/persistent leukocytosis    Metabolic and septic encephalopathy  Small acute, subacute CVA: Depressed level of consciousness and ongoing confusion secondary to both septic and metabolic encephalopathy with both liver and renal failure.  Ammonia level not elevated.  Frequent liquid stools via rectal tube.  EEG did not show any seizure activity, just general encephalopathic slowing.  -General neurology was consulted.  Brain MRI was recommended for completeness which shows a small 3 mm acute to subacute CVA in the right superior frontal gyrus that is unlikely etiology for ongoing encephalopathy  -Stroke neurology now consulted  -Avoid sedating medications on vent as able, currently on propofol    Primary biliary cirrhosis  History esophageal varices  Anasarca  Hypoalbuminemia: PBC diagnosed 10 years ago and follows with [place] hepatology service.  Per her spouse she was being considered for a living donor transplant at the end of last year, but based on risk benefit and her current quite good quality of life they opted to avoid transplant and the risk that this carries especially with  transplant medications.  She is chronically on ursodiol as well as nadolol 40 mg at bedtime, furosemide 20 mg every MWF, omeprazole 40 mg daily, and spironolactone 50 mg daily.    -LFTs continue to rise here in the setting of septic shock and renal failure which portends a poor prognosis, bilirubin is now 15 she has hypoalbuminemia and elevated INR.  -Ursodiol restarted 5/30  -Daily CMP    Lactic acidosis: Persistent lactic acidosis while here in part due to sepsis, renal failure, and liver failure.  Clinically improving from an infection standpoint and removing fluid now.  Not trending lactic acid unless clinical change.    DANDRE on CKD stage II  Anasarca  History of nephrectomy  NAGMA: Previous nephrectomy for nonmalignant mass.  Baseline creatinine 1.1-1.2.  Admission creatinine 2.2 here and progressively worsening and then became an uric secondary to septic shock in the setting of liver failure.  Progressive anasarca secondary to sepsis, hypoalbuminemia, renal failure.  -Nephrology consulted  -Continuing CRRT with UF as able secondary to anasarca  -Nephrology added 25% IV albumin every 6 hours to help mobilize fluid    Hypothyroidism: Resume thyroid replacement.    Hypokalemia: Replacing per CRRT protocol.      DVT Prophylaxis: Pneumatic Compression Devices  Stress ulcer prophylaxis: P.o. Protonix 40 mg daily  Code Status: Full Code.  Palliative care consulted during admission.  Prognosis guarded.  Lines: ET tube, Cuevas catheter, OG, rectal tube, right IJ CVC, left femoral HD catheter, left femoral arterial line  FEN: Tube feeds, dietitian consulted  Discharge Dispo: TBD  Estimated Disch Date / # of Days until Disch: Remains critical in the ICU on CRRT and intubated.  Anticipate multiple day hospitalization.  Prognosis guarded.    Clinically Significant Risk Factors          # Hypocalcemia: Lowest iCa = 4.3 mg/dL in last 2 days, will monitor and replace as appropriate     # Hypoalbuminemia: Lowest albumin = 2.3  "g/dL at 5/22/2023  8:41 AM, will monitor as appropriate  # Coagulation Defect: INR = 2.97 (Ref range: 0.85 - 1.15) and/or PTT = 49 Seconds (Ref range: 22 - 38 Seconds), will monitor for bleeding  # Thrombocytopenia: Lowest platelets = 31 in last 2 days, will monitor for bleeding          # Obesity: Estimated body mass index is 30.7 kg/m  as calculated from the following:    Height as of 5/21/23: 1.549 m (5' 1\").    Weight as of this encounter: 73.7 kg (162 lb 7.7 oz).                       Interval History (Subjective):      Currently afebrile, but leukocytosis now above 50.  Repeat infectious work-up and broad-spectrum vancomycin and meropenem added back.  Blood cultures, C. difficile PCR, bronchoscopy, paracentesis sent with cultures.  Requiring more norepinephrine today limiting ability to remove fluid with CRRT.  Transfused cryoprecipitate and platelets prior to procedures today.  Remains encephalopathic, now on propofol.  Bilirubin rising.  Started on IV vancomycin, meropenem, micafungin.                  Physical Exam:      Last Vital Signs:  /62 (BP Location: Right leg)   Pulse 75   Temp 96.8  F (36  C)   Resp 19   Wt 73.7 kg (162 lb 7.7 oz)   SpO2 100%   BMI 30.70 kg/m        Intake/Output Summary (Last 24 hours) at 5/31/2023 1506  Last data filed at 5/31/2023 1415  Gross per 24 hour   Intake 2255.08 ml   Output 4253 ml   Net -1997.92 ml       Constitutional: Intubated  Eyes: sclera yellow  HEENT: ET tube and OG present  Respiratory: Intubated.  Anterior lungs clear without focal crackles or wheeze.    Cardiovascular: RRR, no murmur appreciated  GI: Mildly distended, bowel sounds present   Genitourinary: Cuevas catheter with scant dark urine output  Skin: Jaundiced  Musculoskeletal/extremities: 2-3+ anasarca  Neurologic: Sedated on propofol, not following commands  Psychiatric: calm currently on propofol         Medications:      All current medications were reviewed with changes reflected in " problem list.         Data:      All new lab and imaging data were personally reviewed.   Labs:  Recent Labs   Lab 06/01/23  1233 06/01/23 0417 05/31/23 1946   WBC 52.1* 55.2* 49.9*   HGB 7.1* 7.9* 7.8*   HCT 21.7* 24.1* 23.5*   * 100 99   PLT 62* 35* 31*     Recent Labs   Lab 06/01/23  1233 06/01/23  1206 06/01/23  0745 06/01/23 0417 05/31/23 2353 05/31/23  1946 05/31/23  0815 05/31/23  0515 05/30/23  0835 05/30/23  0404   *  --   --  137  137  --  136   < > 137  137   < > 138  138   POTASSIUM 4.1  --   --  4.7  4.7  --  4.6   < > 4.1  4.1   < > 4.1  4.1   CHLORIDE 102  --   --  103  103  --  103   < > 103  103   < > 104  104   CO2 19*  --   --  18*  18*  --  19*   < > 20*  20*   < > 21*  21*   ANIONGAP 14  --   --  16*  16*  --  14   < > 14  14   < > 13  13   * 157* 148* 160*  177*  177*   < > 148*  163*   < > 168*  168*   < > 188*  188*   BUN 33.8*  --   --  35.4*  35.4*  --  31.3*   < > 27.7*  27.7*   < > 24.7*  24.7*   CR 0.91  --   --  0.93  0.93  --  0.94   < > 0.90  0.90   < > 0.90  0.90   GFRESTIMATED 65  --   --  64  64  --  63   < > 66  66   < > 66  66   SONYA 8.0*  --   --  8.2*  8.2*  --  8.2*   < > 7.9*  7.9*   < > 7.7*  7.7*   MAG 2.2  --   --  2.2  --  2.2   < > 2.2   < > 2.2   PHOS 4.3  --   --  4.2  --  3.9   < > 4.2   < > 4.2   PROTTOTAL  --   --   --  5.0*  --   --   --  4.7*  --  4.4*   ALBUMIN 3.1*  --   --  3.4*  3.4*  --  3.2*   < > 2.8*  2.8*   < > 2.7*  2.7*   BILITOTAL  --   --   --  16.4*  --   --   --  13.9*  --  11.9*   ALKPHOS  --   --   --  221*  --   --   --  194*  --  177*   AST  --   --   --  58*  --   --   --  68*  --  62*   ALT  --   --   --  48*  --   --   --  53*  --  46*    < > = values in this interval not displayed.      Imaging:   Recent Results (from the past 24 hour(s))   MR Brain w/o Contrast    Narrative    EXAM: MR BRAIN W/O CONTRAST  LOCATION: [facility]  DATE/TIME: 5/31/2023 5:51 PM  CDT    INDICATION: Sepsis, DIC.  COMPARISON: None.  TECHNIQUE: Head MRI without IV contrast including diffusion weighted imaging, FLAIR and hemosiderin sensitive sequences.    FINDINGS:  INTRACRANIAL CONTENTS: There is a 3 mm focal area of diffusion restriction with corresponding T2 FLAIR signal abnormality in the superior frontal gyrus consistent with acute/early subacute microinfarct. No hemorrhage. No mass, acute hemorrhage, or   extra-axial fluid collections. Normal brain parenchymal signal. Normal ventricles and sulci. Normal position of the cerebellar tonsils.     OTHER: Accounting for technique no additional abnormalities identified.      Impression    IMPRESSION:  1.  There is a 3 mm acute/early subacute microinfarct measuring 3 mm in the right superior frontal gyrus.   XR Chest Port 1 View    Narrative    EXAM: XR CHEST PORT 1 VIEW  LOCATION: [facility]  DATE/TIME: 6/1/2023 5:00 AM CDT    INDICATION: increasing oxygen requiremnts  COMPARISON: 05/30/2023      Impression    IMPRESSION: Endotracheal tube tip about 4 cm above the evelio. Enteric tube terminates below the diaphragm. Right IJ central venous catheter tip at cavoatrial junction. Improved aeration at the left lung base, though with persistent infiltrate in the   right lower lung zone. No visible pneumothorax.   CT Abdomen Pelvis w/o Contrast    Narrative    CT ABDOMEN PELVIS WITHOUT CONTRAST  6/1/2023 11:30 AM    HISTORY:  Worsening sepsis and increased WBC.    TECHNIQUE: CT scan obtained of the abdomen, and pelvis without IV  contrast. Radiation dose for this scan was reduced using automated  exposure control, adjustment of the mA and/or kV according to patient  size, or iterative reconstruction technique.    COMPARISON: CT abdomen and pelvis on 5/21/2023.    FINDINGS:    Lower chest: Small right pleural effusion and associated basilar  atelectasis/consolidation. Complete consolidation of the left lower  lobe, and patchy  consolidative pulmonary opacities predominantly in  the right middle lingula with right middle lobe cavitation.    Abdomen/pelvis:Limited evaluation of the abdominal organs due to lack  of intravenous contrast, within this limitation, there is;    Hepatobiliary: Cirrhotic liver morphology. The gallbladder is  surgically absent.    Pancreas: The unenhanced pancreas is grossly unremarkable.    Spleen: The spleen is enlarged measuring 13.7 cm in craniocaudal  dimension.    Adrenal glands: No left adrenal nodule. The right adrenal nodule is  not visualized, likely surgically absent.    Kidneys: The right kidney is not visualized, likely surgically absent.  2 mm stone at the interpolar region of the left kidney. No left  ureteral stone or hydronephrosis.    Bowel: No abnormally dilated bowel loops. There is an enteric tube  with distal tip in the distal stomach. There is a rectal tube.    Peritoneum: Moderate amount of free fluid in the abdomen and pelvis,  not significantly changed as compared to 5/21/2023 exam. No free  peritoneal or portal venous gas.    Pelvic organs: Small focus of gas within the urinary bladder, could be  related to recent catheterization.    Vascular: There is a left femoral approach catheter with the tip in  the left external iliac artery. There is a left femoral approach  venous catheter with the tip in the distal aspect of the left common  iliac artery. Enlarged venous collaterals in the upper abdomen.    Lymph nodes: Multiple mildly enlarged upper abdominal lymph nodes,  likely reactive.    Bones and soft tissue: No suspicious osseous lesion. Generalized  anasarca.      Impression    IMPRESSION:   1. There is complete collapse of the left lower lobe, new as compared  to 5/21/2023 exam. Bilateral patchy consolidative pulmonary opacities  including cavitary opacities in the right middle lobe, new as compared  to 5/21/2023 exam, likely infectious. Small right pleural effusion and  associated  basilar atelectasis/consolidation  2. Cirrhotic liver morphology with evidence of portal hypertension  including splenomegaly, enlarged portosystemic collaterals and  moderate volume ascites.      LEEROY JARRELL MD         SYSTEM ID:  [id number]           Troy Diggs MD

## 2023-06-01 NOTE — PROGRESS NOTES
PROCEDURE      Procedure: Bronchoscopy    Indication: respiratory failure, increasing WBC  Possible VAP   RML lung infiltrate with cavitation and LLL infiltrate collapse     Universal protocol: Consent was obtained from     Patient Identification was verified, time out was performed, site marking N/A, Imaging data was reviewed personally prior to the procedure. Full Barrier precaution: Hands washed, mask, gloves, gown, cap and eye protection all used.    Narrative:  Recent History and Physical performed. Patient meds and allergies reviewed.   Pre-oxygenated with 100% and O2 saturations 100% at start of procedure  PEEP reduced to 5.    Continued on current dose of propofol .     Regular size Ambu disposable scope used and inserted into 7 ETT with adaptor.      Findings:   ETT tip at 2 cm above evelio  Airways erythematous but no tumor  Moderate secretions in LLL but not completely obstructed.   BAL in RML 50 ml x 2 with return of 30 ml orange cloudy fluid  Removed scope and allowed ventilation for 5 minutes   BAL in LLL 50 ml x 2 with return of 25 ml orange cloudy fluid with mucus plugs.    Combined in to one specimen--lab order is labelled as RLL but it's RML and LLL      Complication:  no apparent complication, procedure well tolerated O2 saturations never lower than 98%.    Lab requested  A. BAL fluid : sent for cytology, cell # and differential, gram and fungal staining. Bacterial, fungal, AFB

## 2023-06-01 NOTE — PROGRESS NOTES
"Teresa De Santiago is a 73 year old female with PB cirrhosis admitted 5/22 with septic shock seconday to e coli bacteremia and SBP. Remains in multi-organ failure      Overnight:  Increased O2 needs and ventilator dyssynchrony now improved with propofol.  Spontaneous movement of feet but not a typical \"seizure\" movement when I questioned the overnight RN.        96.0  RR 18 HR 80 120/50 on norepinephrine 0.07   Eyes closed, does not follow commands or open eyes. Does move both feet slightly to stimulation.  No spontaneous movement or twitching.    Conjunctival edema and Jaundiced pupils 2-3 mm bilateral   ETT ok  Lungs clear   H- RR w/o m  Abdomen slightly distended but more tympanitic than dullness   Cuevas out   Diffuse anasarca, weeping skin tears. ecchymoses scattered all over.  Less edema in both hand    HD Catheter in left groin with weeping  No cyanosis.  Feet are warm        Neuro: encephalopathy seconday to infection plus liver failure.  EEG c/w metabolic encephalopathy / Fentanyl drip stopped 5 days ago.  CT showed no acute intracranial pathology and MRI with 3 mm frontal microinfarct which should not cause any decrease in LOC.     P: propofol  Sedation for tachypnea       Pulm: Ventilator d 12 . CMV 15 x 450 PEEP 10   V. 60%    Breathing above set ventilator rate and TVs are 430 .   CXR with slightly increased RLL  infiltrate.    P: continue current ventilator settings   Bronchoscopy for new VAP     CV: Septic shock from e coli bacteremia. Hydrocortisone 50 q 6 for shock was decreased to 25 q 6 yesterday  Echo normal on 5/23  No arrhythmias. Remains on norepinephrine and off vasopressin and was able to pull 180-200 per hour net negative on CRRT except for last night.     P:  wean norepinephrine as tolerated   Continue HC 25 q 6   Check lactate     GI: PBC followed at [facility]. Not a transplant candidate.  Increasing bilirubin c/w decompensated cirrhosis, higher than yesterday. And coagulation parameters remain " abnormal c/w poor synthetic function,   My bedside US shows small ascites bilateral lower abdomen   P: continue ursodiol    Tube feeds at 45--goal.   Abdomen/pelvis CT to evaluate cause of increased WBC  Diagnostic paracentesis     Renal: DANDRE,  50-80 ml/d urinary output.--guerra removed yesterday for MRI. RRT since admission.  -3 kg since yesterday 150 200 ml/hr  net negative now but remains anasarca but weight now below admission weight.  .  P: Continue CRRT until off pressors     Endo: Glucose levels ok . On Godfrey for thyroid dysfunction.    ID: SBP.  E. Coli bacteremia. .completed 10 d of Zosyn so stopped yesterday but now with increased WBC will restart broad antibiotics--vancomycin and meropenem     Bronch and paracentesis       Heme: CLL plus liver and sepsis related DIC.  Not actively bleeding. Platelets remains low at 35 and fbrinogen low and decreasing c/w so needing cryo replacement for paracentesis.  Does  Have recent dx of  CLL but not treated.    P: 1  5 pack of cryo today   Check WBC differential     PPx: VTE, GI,    35 min of critical care time spent by me exclusive of procedures.

## 2023-06-01 NOTE — PROVIDER NOTIFICATION
DATE/TIME OF CALL RECEIVED FROM LAB:  6/1/23 at 4:33 AM   LAB TEST:  WBC and Platelets  LAB VALUE:  WBC: 55.2, Platelet: 35  PROVIDER NOTIFIED: [facility] called  PROVIDER NAME: Lux [facility]OPE[facility]  DATE/TIME LAB VALUE REPORTED TO PROVIDER: 04:29 am  MECHANISM OF PROVIDER NOTIFICATION: Phone call to [employer]  PROVIDER RESPONSE: Pending any orders.

## 2023-06-01 NOTE — PROVIDER NOTIFICATION
2035: called [facility], to notify MRI results are back. Tele RN to notify tele Maren NEWBY.    2050: order received for routine telestroke consult    2125: called tele [facility], to clarify that telestroke consult is routine, not ASAP or STAT. Per tele RN, order entered by MD, as routine. Has already consulted with neuro.    0405: called tele hub RN to discuss pt requiring 100% fio2 to maintain sats, attempted suction/reposition etc. Inquired if want abg or increase peep    Order for abg and cxr    0455- called [facility] and asked to camera in, pt twitching, requiring increased doses levo, overbreathing vent    tele MD and tele RN camera'd in, orders for propofol and vaso. Pupils PERRLA.

## 2023-06-01 NOTE — TELECONSULT
Chart reviewed including MRI from this evening.  MRI shows 3 mm acute early subacute microinfarct in the right superior frontal gyrus.  I do not think that this would explain the patient's overall neurologic condition.  We will obtain a neuro critical care consult at this time and will continue with tight blood pressure management.    Konstantin Engel MD  Critical Care Medicine

## 2023-06-01 NOTE — PLAN OF CARE
Assumed care of patient at 1500            ICU End of Shift Summary.  For vital signs and complete assessments, please see documentation flowsheets.      Pertinent assessments:   Neuro: slightly responsive to pain  Cardiac: NSR on tele  Resp: dim coarse LS, vent in place, weaning FiO2 as tolerated  GI: rectal tube minimal output, abd distention   : anuric, CRRT stopped @ 1500 r/t no one available to run CRRT  Skin: jaundice, weeping generalized  Lines: R internal jugular CVC, L fem art line, L fem HD catheter  Drips: prop, levo    Major Shift Events:     No big changes from [id number]

## 2023-06-01 NOTE — PROGRESS NOTES
Assisted with transport of pt from ICU to CT and back on the ventilator.  No complications were noted.    Yelena Grimes, RT on 6/1/2023 at 2:51 PM

## 2023-06-01 NOTE — PLAN OF CARE
"Shift Summary:  Cardiac:  SR 60-80s throughout shift.  Edema noted generalized throughout.   Neuro:  Patient sedated with propofol.  Responds to painful stimulus, withdraws extremities.  Pupils equal round reactive to light.   GI/:  anuria.  Rectal tube in place. OG with tube feeds at goal  Skin:  weeping skin, multiple skin tears, bruises and petechiae noted. CRRT throughout shift with -50 to -100 net.  Patient tolerated well will no required changes to levophed.   Respiratory:  supported by ventilator.  Bronchoscopy today for cultures.  Coarse lung sounds throughout, tan secretions suctioned.      Patient had CT abdomen pelvis, blood cultures, paracentesis, bronchoscopy with cultures today due to elevated white count.  Added antifungal.  CRRT pulling -50 to -100 throughout shift.  Stopped CRRT at 1550 for a few hours, will restart at 7pm.        Problem: Plan of Care - These are the overarching goals to be used throughout the patient stay.    Goal: Plan of Care Review  Description: The Plan of Care Review/Shift note should be completed every shift.  The Outcome Evaluation is a brief statement about your assessment that the patient is improving, declining, or no change.  This information will be displayed automatically on your shift note.  Outcome: Not Progressing  Goal: Patient-Specific Goal (Individualized)  Description: You can add care plan individualizations to a care plan. Examples of Individualization might be:  \"Parent requests to be called daily at 9am for status\", \"I have a hard time hearing out of my right ear\", or \"Do not touch me to wake me up as it startles me\".  Outcome: Not Progressing  Goal: Absence of Hospital-Acquired Illness or Injury  Outcome: Not Progressing  Intervention: Identify and Manage Fall Risk  Recent Flowsheet Documentation  Taken 6/1/2023 1200 by Kelli King, RN  Safety Promotion/Fall Prevention:   activity supervised   assistive device/personal items within reach   clutter " free environment maintained   increased rounding and observation   increase visualization of patient   lighting adjusted   nonskid shoes/slippers when out of bed  Intervention: Prevent Skin Injury  Recent Flowsheet Documentation  Taken 6/1/2023 1440 by Kelli King RN  Body Position: (patients vitals changing) supine  Taken 6/1/2023 1400 by Kelli King RN  Body Position:   turned   right   heels elevated  Taken 6/1/2023 1200 by Kelli King RN  Body Position:   turned   left   heels elevated   upper extremity elevated  Intervention: Prevent and Manage VTE (Venous Thromboembolism) Risk  Recent Flowsheet Documentation  Taken 6/1/2023 1200 by Kelli King RN  VTE Prevention/Management: SCDs (sequential compression devices) off  Goal: Optimal Comfort and Wellbeing  Outcome: Not Progressing  Intervention: Provide Person-Centered Care  Recent Flowsheet Documentation  Taken 6/1/2023 1200 by Kelli King RN  Trust Relationship/Rapport:   care explained   reassurance provided  Goal: Readiness for Transition of Care  Outcome: Not Progressing     Problem: Sepsis/Septic Shock  Goal: Optimal Coping  Outcome: Not Progressing  Goal: Absence of Bleeding  Outcome: Not Progressing  Goal: Blood Glucose Level Within Targeted Range  Outcome: Not Progressing  Goal: Absence of Infection Signs and Symptoms  Outcome: Not Progressing  Intervention: Promote Recovery  Recent Flowsheet Documentation  Taken 6/1/2023 1200 by Kelli King RN  Activity Management: bedrest  Goal: Optimal Nutrition Intake  Outcome: Not Progressing     Problem: Enteral Nutrition  Goal: Absence of Aspiration Signs and Symptoms  Outcome: Not Progressing  Intervention: Minimize Aspiration Risk  Recent Flowsheet Documentation  Taken 6/1/2023 1400 by Kelli King RN  Head of Bed (HOB) Positioning: HOB at 20-30 degrees  Taken 6/1/2023 1200 by Kelli King RN  Oral Care:   lip/mouth moisturizer applied   swabbed with antiseptic solution    suction provided  Head of Bed (HOB) Positioning: HOB at 30 degrees  Goal: Safe, Effective Therapy Delivery  Outcome: Not Progressing  Goal: Feeding Tolerance  Outcome: Not Progressing

## 2023-06-01 NOTE — PROCEDURES
[facility]    Paracentesis    Date/Time: 6/1/2023 1:09 PM    Performed by: Giuseppe Guzman MD  Authorized by: Giuseppe Guzman MD    PRE-PROCEDURE DETAILS  Initial or subsequent exam: initial  Procedure purpose: diagnostic  Indications: suspected peritonitis        UNIVERSAL PROTOCOL   Site Marked: Yes  Prior Images Obtained and Reviewed:  Yes  Required items: Required blood products, implants, devices and special equipment available    Patient identity confirmed:  Arm band  Patient was reevaluated immediately before administering moderate or deep sedation or anesthesia  Confirmation Checklist:  Patient's identity using two indicators, procedure was appropriate and matched the consent or emergent situation and correct equipment/implants were available  Time out: Immediately prior to the procedure a time out was called    Universal Protocol: the Joint Commission Universal Protocol was followed    Preparation: Patient was prepped and draped in usual sterile fashion    ESBL (mL):  0    SEDATION    Patient Sedated: No    POST PROCEDURE DETAILS  Needle gauge: 22  Ultrasound guidance: yes  Puncture site: right lower quadrant  Fluid appearance: cloudy        PROCEDURE  Describe Procedure: Dx:  Liver failure sepsis    Used US to visualize pocket of fluid in RLQ.  Used 22 G spinal needle and entered skin, at 3 cm obtained fluid and withdrew 20 ml cloudy fluid. Removed needle and placed band aid.   Patient Tolerance:  Patient tolerated the procedure well with no immediate complications  Length of time physician/provider present for 1:1 monitoring during sedation: 0

## 2023-06-01 NOTE — PROGRESS NOTES
[facility] ICU VENTILATOR RESPIRATORY NOTE  Date of Admission: 5/22/23  Date of Intubation (most recent):5/22/23  Reason for Mechanical Ventilation: Respiratory Failure  Number of Days on Mechanical Ventilation: 11  Met Criteria for Pressure Support Trial: No  Reason for No Pressure Support Trial: Pt is on pressors  ETT appearance on chest x-ray: In appropriate place    Respiratory Therapy  Patient remains intubated on mechanical ventilator with the following settings:    Vent Mode: CMV/AC  (Continuous Mandatory Ventilation/ Assist Control)  FiO2 (%): 60 %  Resp Rate (Set): 15 breaths/min  Tidal Volume (Set, mL): 450 mL  PEEP (cm H2O): 10 cmH2O  Resp: 19    Temp: 96.8  F (36  C) Temp src: Axillary BP: 115/62 Pulse: 67   Resp: 19 SpO2: 100 % O2 Device: Mechanical Ventilator      BS were coarse and diminished. Suctioned moderate to large amounts red streaked thick secretions through the ETT.  Will continue to follow and assess the patient's respiratory needs.    Yelena Grimes, RT  6/1/2023 2:52 PM

## 2023-06-02 NOTE — PROGRESS NOTES
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 Coli bacteremia, severe lactic acidosis, SBP, multi-organ failure. She has been intubated. She is requiring CRRT for DANDRE and pressors for support of blood pressure.          #DIC due to underlying septic shock/E. Coli bacteremia and multi-organ failure  #Acute anemia  #Acute thrombocytopenia  #Acute neutrophilia  #Direct hyperbilirubinemia  #Coagulopathy  #History of CLL, IGHV mutated, in observation    Her  and son was by her side.  There has been a significant rise in her total white cell count over the last couple of days.  There has been a concern for worsening CLL.  However on reviewing the differential it appears that the primary rise has been in her neutrophil counts rather than her lymphocytes.  A significant increase in her neutrophils will argue against rapid worsening of her CLL.  In fact her platelet counts also have doubled from their baseline.  Rising neutrophil and platelet count suggests infectious/inflammatory process.  Her lactic acidosis also has increased.  Her lymphocyte count has increased in proportion to the rise of rest of her white cells which might not be related to the clonal process but an appropriate response to infection.    Despite this she remains critically ill with multiorgan failure and needing support to maintain her blood pressures, CRRT for acute kidney injury, decompensated cirrhosis, septic shock, pancytopenia.  She has overall grim prognosis.    No new recommendations from hematology perspective.  -Transfuse 5U cryoprecipitate for fibrinogen <100 (she is on CRRT, which may be contributing to the refractoriness of cryo transfusion, in addition to ongoing septic shock).  -Transfuse PRBC for Hb </=7.  -Transfuse PLT for PLT  </= 20K.     #Acute VDRF  #Acute septic shock/E. Coli bacteremia  #Acute decompensated cirrhosis with underlying PBC  -Patient remains vented and sedated.  -She remains on pressors  and stress-dose cortef.     #Acute renal failure on CRRT  -Nephrology following.     Labs and scans reviewed  Recent Labs   Lab Test 06/02/23  1221 06/02/23  1216 06/02/23  0749 06/02/23  0401 06/02/23  0400 06/01/23 2005 06/01/23 2004 06/01/23  1553 06/01/23  1233 06/01/23  0745 06/01/23  0417     --   --  136  136  --   --  137  --  135*  --  137  137   POTASSIUM 5.0  --   --  4.4  4.4  --   --  4.5  --  4.1  --  4.7  4.7   CHLORIDE 103  --   --  101  101  --   --  103  --  102  --  103  103   CO2 18*  --   --  19*  19*  --   --  18*  --  19*  --  18*  18*   ANIONGAP 17*  --   --  16*  16*  --   --  16*  --  14  --  16*  16*   BUN 33.6*  --   --  35.9*  35.9*  --   --  41.9*  --  33.8*  --  35.4*  35.4*   CR 0.94  --   --  0.92  0.92  --   --  1.06*  --  0.91  --  0.93  0.93   * 151* 168* 177*  177* 159*   < > 192*   < > 172*   < > 160*  177*  177*   SONYA 8.4*  --   --  8.4*  8.4*  --   --  8.7*  --  8.0*  --  8.2*  8.2*    < > = values in this interval not displayed.     Recent Labs   Lab Test 06/02/23  1221 06/02/23  0401 06/01/23 2004 06/01/23  1233 06/01/23  0417   MAG 2.3 2.2 2.2 2.2 2.2   PHOS 5.1* 4.8* 4.9* 4.3 4.2     Recent Labs   Lab Test 06/02/23  1221 06/02/23  0401 06/01/23 2004 06/01/23  1233 06/01/23  0417 05/24/23 2002 05/24/23  1647 05/23/23 2010 05/23/23  1458 05/22/23  2216 05/22/23  0841   WBC 65.9* 70.3*  70.3* 68.4* 52.1* 55.2*   < > 22.5*   < > 58.1*   < > 18.4*   HGB 7.4* 7.7* 7.8* 7.1* 7.9*   < > 7.2*   < > 8.3*   < > 10.3*   PLT 60* 66* 70* 62* 35*   < > 15*   < > 49*   < > 32*   * 100 99 101* 100   < > 96   < > 100   < > 99   NEUTROPHIL  --  61  --   --  63  --  81  --  36  --  14    < > = values in this interval not displayed.     Recent Labs   Lab Test  06/02/23  1221 06/02/23  0401 06/01/23 2004 06/01/23  1233 06/01/23  0417 05/31/23  1314 05/31/23  0515 05/24/23 2002 05/24/23  1647   BILITOTAL  --  16.4*  --   --  16.4*  --  13.9*   < > 5.9*   ALKPHOS  --  179*  --   --  221*  --  194*   < > 100   ALT  --  38*  --   --  48*  --  53*   < > 30   AST  --  43*  --   --  58*  --  68*   < > 45*   ALBUMIN 3.5 3.7  3.7 3.6   < > 3.4*  3.4*   < > 2.8*  2.8*   < > 3.4*   LDH  --   --   --   --   --   --   --   --  167    < > = values in this interval not displayed.     No results found for: TSH  No results for input(s): CEA in the last 71150 hours.  Results for orders placed or performed during the hospital encounter of 05/22/23   US Paracentesis without Albumin    Narrative    US PARACENTESIS WITHOUT ALBUMIN 5/22/2023 2:30 PM    CLINICAL HISTORY: E.coli bacteremia, ascites    PROCEDURE: Informed consent obtained. Time out performed. The abdomen  was prepped and draped in a sterile fashion. 10 mL of 1% lidocaine was  infused into local soft tissues. A 5 [place] catheter system was  introduced into the abdominal ascites under ultrasound guidance.    1.4 liters of clear fluid were removed and sent to lab if requested.    Patient tolerated procedure well.    Ultrasound imaging was obtained and placed in the patient's permanent  medical record.      Impression    IMPRESSION:  1.  Status post ultrasound-guided paracentesis.    HOWARD WORKMAN MD         SYSTEM ID:  [id number]   XR Chest Port 1 View    Narrative    XR PORTABLE CHEST ONE VIEW   5/22/2023 11:45 AM     HISTORY: Central line placement.    COMPARISON: None.      Impression    IMPRESSION: Right neck central venous line tip within the mid right  atrium. Consider retraction of this line by approximately 2.9 cm for  placement at the cavoatrial junction. Left base atelectasis.  Hypoinflated lungs. Normal cardiac silhouette.    KENDRICK EATON MD         SYSTEM ID:  [id number]   XR Chest Port 1 View    Narrative    EXAM: XR  CHEST PORT 1 VIEW  LOCATION: [facility]  DATE/TIME: 5/22/2023 7:15 PM CDT    INDICATION: Endotracheal tube positioning.  COMPARISON: 5/22/2023 11:36 AM      Impression    IMPRESSION: Patient has been intubated with tube tip 3.8 cm above the evelio. Right IJ central venous catheter tip RA/SVC junction. Nasogastric tube enters the stomach with tip inferior to the radiographic field of view. Minimal bibasilar pleural fluid   and atelectasis. Heart size and pulmonary vascularity within normal limits.   XR Abdomen Port 1 View    Narrative    EXAM: XR ABDOMEN PORT 1 VIEW  LOCATION: [facility]  DATE/TIME: 5/22/2023 7:16 PM CDT    INDICATION: Verify OG placement.  COMPARISON: 05/21/2023 CT AP.      Impression    IMPRESSION: Nasogastric tube tip in the mid stomach. Mild gaseous distention of the stomach. Bowel gas pattern is otherwise normal. Nothing for obstruction or free air. Surgical clips in the right upper quadrant. Minimal bibasilar pleural fluid and   atelectasis.   XR Chest Port 1 View    Narrative    CHEST ONE VIEW PORTABLE May 23, 2023 2:42 PM     HISTORY: Failed line placement.    COMPARISON: 5/22/2023.      Impression    IMPRESSION: Endotracheal tube, enteric tube, and right IJ central  venous catheter are unchanged. A small left pleural effusion with  associated opacity at the left lung base has increased since the  previous exam. Mild patchy opacity at the right lung base is  unchanged. No pneumothorax.    ELLEN GOLDBERG MD         SYSTEM ID:  [id number]   CT Head w/o Contrast    Narrative    CT HEAD W/O CONTRAST 5/26/2023 11:05 AM    INDICATION: off sedation, not waking. Acute pathology?  TECHNIQUE: CT scan of the head without contrast. Dose reduction  techniques were used.  CONTRAST: None.  COMPARISON: None.    FINDINGS:   No intracranial hemorrhage, extraaxial collection, mass effect or CT  evidence of acute infarct.  Mild presumed chronic small vessel  ischemic  changes. Mild generalized volume loss. The ventricles are  proportional to the sulci. Benign-appearing thinning in the right  frontal and right parietal calvarium. Postoperative changes to the  bilateral lenses. Paranasal sinuses are free of significant disease.  Clear mastoid air cells.      Impression    IMPRESSION:  Mild age-related changes as above with no acute intracranial  abnormality.    STEPHANIE DALTON MD         SYSTEM ID:  [id number]   XR Chest Port 1 View    Narrative    CHEST ONE VIEW PORTABLE May 30, 2023 9:59 AM     HISTORY: Intubation.    COMPARISON: 5/23/2023.      Impression    IMPRESSION: Endotracheal tube, tip 4.2 cm above the evelio. An enteric  tube, tip not seen, but below the diaphragm. Right IJ central venous  catheter, tip near the SVC/RA junction. Bibasilar opacities, greater  on the left, have increased slightly since the previous exam. A small  left pleural effusion is also increased. No pneumothorax.    ELLEN GOLDBERG MD         SYSTEM ID:  [id number]   MR Brain w/o Contrast    Narrative    EXAM: MR BRAIN W/O CONTRAST  LOCATION: [facility]  DATE/TIME: 5/31/2023 5:51 PM CDT    INDICATION: Sepsis, DIC.  COMPARISON: None.  TECHNIQUE: Head MRI without IV contrast including diffusion weighted imaging, FLAIR and hemosiderin sensitive sequences.    FINDINGS:  INTRACRANIAL CONTENTS: There is a 3 mm focal area of diffusion restriction with corresponding T2 FLAIR signal abnormality in the superior frontal gyrus consistent with acute/early subacute microinfarct. No hemorrhage. No mass, acute hemorrhage, or   extra-axial fluid collections. Normal brain parenchymal signal. Normal ventricles and sulci. Normal position of the cerebellar tonsils.     OTHER: Accounting for technique no additional abnormalities identified.      Impression    IMPRESSION:  1.  There is a 3 mm acute/early subacute microinfarct measuring 3 mm in the right superior frontal gyrus.   XR Chest Port 1 View     Narrative    EXAM: XR CHEST PORT 1 VIEW  LOCATION: [facility]  DATE/TIME: 6/1/2023 5:00 AM CDT    INDICATION: increasing oxygen requiremnts  COMPARISON: 05/30/2023      Impression    IMPRESSION: Endotracheal tube tip about 4 cm above the evelio. Enteric tube terminates below the diaphragm. Right IJ central venous catheter tip at cavoatrial junction. Improved aeration at the left lung base, though with persistent infiltrate in the   right lower lung zone. No visible pneumothorax.   CT Abdomen Pelvis w/o Contrast    Narrative    CT ABDOMEN PELVIS WITHOUT CONTRAST  6/1/2023 11:30 AM    HISTORY:  Worsening sepsis and increased WBC.    TECHNIQUE: CT scan obtained of the abdomen, and pelvis without IV  contrast. Radiation dose for this scan was reduced using automated  exposure control, adjustment of the mA and/or kV according to patient  size, or iterative reconstruction technique.    COMPARISON: CT abdomen and pelvis on 5/21/2023.    FINDINGS:    Lower chest: Small right pleural effusion and associated basilar  atelectasis/consolidation. Complete consolidation of the left lower  lobe, and patchy consolidative pulmonary opacities predominantly in  the right middle lingula with right middle lobe cavitation.    Abdomen/pelvis:Limited evaluation of the abdominal organs due to lack  of intravenous contrast, within this limitation, there is;    Hepatobiliary: Cirrhotic liver morphology. The gallbladder is  surgically absent.    Pancreas: The unenhanced pancreas is grossly unremarkable.    Spleen: The spleen is enlarged measuring 13.7 cm in craniocaudal  dimension.    Adrenal glands: No left adrenal nodule. The right adrenal nodule is  not visualized, likely surgically absent.    Kidneys: The right kidney is not visualized, likely surgically absent.  2 mm stone at the interpolar region of the left kidney. No left  ureteral stone or hydronephrosis.    Bowel: No abnormally dilated bowel loops. There is an  enteric tube  with distal tip in the distal stomach. There is a rectal tube.    Peritoneum: Moderate amount of free fluid in the abdomen and pelvis,  not significantly changed as compared to 2023 exam. No free  peritoneal or portal venous gas.    Pelvic organs: Small focus of gas within the urinary bladder, could be  related to recent catheterization.    Vascular: There is a left femoral approach catheter with the tip in  the left external iliac artery. There is a left femoral approach  venous catheter with the tip in the distal aspect of the left common  iliac artery. Enlarged venous collaterals in the upper abdomen.    Lymph nodes: Multiple mildly enlarged upper abdominal lymph nodes,  likely reactive.    Bones and soft tissue: No suspicious osseous lesion. Generalized  anasarca.      Impression    IMPRESSION:   1. There is complete collapse of the left lower lobe, new as compared  to 2023 exam. Bilateral patchy consolidative pulmonary opacities  including cavitary opacities in the right middle lobe, new as compared  to 2023 exam, likely infectious. Small right pleural effusion and  associated basilar atelectasis/consolidation  2. Cirrhotic liver morphology with evidence of portal hypertension  including splenomegaly, enlarged portosystemic collaterals and  moderate volume ascites.      LEEROY JARRELL MD         SYSTEM ID:  [id number]   Echocardiogram Complete     Value    LVEF  55-60%    Narrative    [id number]  [facility]  [id number]  [id number]^YUE^BRITT     [facility]  Echocardiography Laboratory  [address] [place] [address]     Name: PARTHA MEDEL  MRN: [id number]  : 1950  Study Date: 2023 08:14 AM  Age: 73 yrs  Gender: Female  Patient Location: [facility]  Reason For Study: Shock  Ordering Physician: BRITT TURNER  Performed By: Joan Moses     BSA: 1.8 m2  Height: 61 in  Weight: 172 lb  HR: 65  BP: 86/49  mmHg  ______________________________________________________________________________  Procedure  Complete Portable Echo Adult. Optison (NDC #1302-0601) given intravenously.  ______________________________________________________________________________  Interpretation Summary     The visual ejection fraction is 55-60%.  Left ventricular systolic function is normal.  There is mild to moderate (1-2+) tricuspid regurgitation.  Borderline aortic stenosis.  There is trace aortic regurgitation.  The study was technically difficult.  ______________________________________________________________________________  Left Ventricle  The left ventricle is normal in size. There is normal left ventricular wall  thickness. Diastolic Doppler findings (E/E' ratio and/or other parameters)  suggest left ventricular filling pressures are indeterminate. The visual  ejection fraction is 55-60%. Left ventricular systolic function is normal.     Right Ventricle  The right ventricle is normal in size and function.     Atria  Normal left atrial size. The right atrium is mildly dilated. There is no color  Doppler evidence of an atrial shunt.     Mitral Valve  There is mild (1+) mitral regurgitation.     Tricuspid Valve  There is mild to moderate (1-2+) tricuspid regurgitation. The right  ventricular systolic pressure is approximated at 36.7 mmHg plus the right  atrial pressure. Right ventricular systolic pressure is elevated, consistent  with mild to moderate pulmonary hypertension.     Aortic Valve  The aortic valve is not well visualized. There is trace aortic regurgitation.  The calculated aortic valve are is 2.0 cm^2. The peak AoV pressure gradient  is  20.0 mmHg. The mean AoV pressure gradient is 9.0 mmHg. Borderline aortic  stenosis.     Pulmonic Valve  There is no pulmonic valvular regurgitation.     Vessels  The aortic root is normal size. Unable to assess mean RA pressure given the  patient is on a ventilator.     Pericardium  There  is no pericardial effusion. Epicardial fat pad noted.     Rhythm  Sinus rhythm was noted.  ______________________________________________________________________________  MMode/2D Measurements & Calculations     IVSd: 1.0 cm  LVIDd: 4.4 cm  LVIDs: 2.7 cm  LVPWd: 1.0 cm  IVC diam: 3.0 cm  FS: 38.6 %  LV mass(C)d: 150.9 grams  LV mass(C)dI: 85.2 grams/m2  Ao root diam: 2.7 cm  LVOT diam: 1.7 cm  LVOT area: 2.3 cm2  LA Volume (BP): 57.6 ml  LA Volume Index (BP): 32.5 ml/m2  RV Base: 4.3 cm  RWT: 0.46     TAPSE: 2.9 cm     Doppler Measurements & Calculations  MV E max jayson: 130.0 cm/sec  MV A max jayson: 102.0 cm/sec  MV E/A: 1.3  MV max P.1 mmHg  MV mean P.0 mmHg  MV V2 VTI: 42.9 cm  MVA(VTI): 2.3 cm2  MV dec time: 0.18 sec  Ao V2 max: 222.0 cm/sec  Ao max P.0 mmHg  Ao V2 mean: 134.0 cm/sec  Ao mean P.0 mmHg  Ao V2 VTI: 49.5 cm  MARTIN(I,D): 2.0 cm2  MARTIN(V,D): 1.9 cm2  LV V1 max P.3 mmHg  LV V1 max: 189.0 cm/sec  LV V1 VTI: 43.2 cm  SV(LVOT): 98.1 ml  SI(LVOT): 55.3 ml/m2  TR max jayson: 303.0 cm/sec  TR max P.7 mmHg  AV Jayson Ratio (DI): 0.85  MARTIN Index (cm2/m2): 1.1  E/E' av.4  Lateral E/e': 12.7  Medial E/e': 14.1  RV S Jayson: 15.4 cm/sec     ______________________________________________________________________________  Report approved by: Max Hamm 2023 11:33 AM               I have reviewed her case with Dr. Castillo (intensivist), Dr. Diggs (hospitalist) and family.    No new recommendations for now.    Hematology to follow peripherally. Please contact our team with any further questions or concerns.     55 minutes spent on the date of the encounter doing chart review, history and exam, documentation and further activities as noted above

## 2023-06-02 NOTE — CONSULTS
[facility] Nurse Inpatient Assessment     Consulted for: Left groin skin tears    Patient History (according to provider note(s):      Teresa De Santiago is a 73 year old female with history of primary biliary cirrhosis diagnosed 10 years ago, esophageal varices, CLL, anemia, thrombocytopenia, and previous nephrectomy who was admitted on 5/22/2023 after being called back for positive blood cultures for E. coli after initially presenting the day before with malaise and abdominal pain for a few days.     In the ER she received 2 L of IV fluids for hypotension.  She was quite hypoglycemic requiring D50.  She was started on norepinephrine and admitted to the ICU after paracentesis was done showing cloudy and yellow fluid.  She was empirically started on IV vancomycin and IV Zosyn.  She continued to worsen after admission requiring 3 pressors, IV stress dose hydrocortisone, and she had progressive renal failure.  Nephrology was consulted and she was started on CRRT.  She had worsening anemia and thrombocytopenia from baseline along with a low fibrinogen concerning for DIC.  Oncology was consulted and she has received multiple transfusions of cryoprecipitate, FFP, PRBCs, and platelets.  Multiple blood cultures and her ascites fluid is positive for E. coli.     She has had persistent encephalopathy despite holding all sedation.  Noncontrast head CT was unremarkable.  EEG did not show any seizure focus, but generalized slowing consistent with encephalopathy.  Neurology was consulted and recommends a brain MRI which was done and shows a small 3 mm acute to subacute CVA in the right superior frontal gyrus that is unlikely etiology for encephalopathy.  She continues to require low-dose norepinephrine.  CRRT continues and we are attempting to perform some UF for her significant anasarca and we have started IV albumin to assist with this.  LFTs progressively worsening while here.     Assessment:       Areas visualized during today's visit: Focused:    Wound location: Left groin  Last photo: 6/2/23        Wound due to: Viral Lesions vs Skin tears   Wound history/plan of care: Patient with multiple areas under and around femoral dialysis catheter.  Unknown if injury occurred related to adhesive removal.  Multiple grouped pustules on erythematous base noted concerning for viral (herpes) etiology.  Bedside RN discussed with MD who is not concerned with it being viral at this time.    Wound base: Multiple intact and ruptured pustules ranging from 0.2x0.2cm to 4x3cm located under and around dialysis catheter dressing.  Most of the pustules are located on an erythematous base     Palpation of the wound bed: normal      Drainage: large     Description of drainage: serous and tan     Measurements (length x width x depth, in cm): see above     Tunneling: N/A     Undermining: N/A  Periwound skin: Erythema- blanchable      Color: pink      Temperature: normal   Odor: none  Pain: unable to assess due to  sedation   Pain interventions prior to dressing change: N/A  Treatment goal: Heal  and Drainage control  STATUS: initial assessment  Supplies ordered: supplies stored on unit and discussed with RN       Treatment Plan:     Left groin wound(s): BID   1. Cleanse exposed wounds (not under dialysis catheter dressing) with wound cleanser and dry  2. Paint exposed wounds with Iodine swabs and leave open to air     Orders: Written    RECOMMEND PRIMARY TEAM ORDER: None, at this time  Education provided: plan of care  Discussed plan of care with: Patient and Nurse  WOC nurse follow-up plan: weekly  Notify WOC if wound(s) deteriorate.  Nursing to notify the Provider(s) and re-consult the WOC Nurse if new skin concern.    DATA:     Current support surface: Standard  Low air loss (UNRULY pump, Isolibrium, Pulsate, skin guard, etc)  Containment of urine/stool: Internal fecal management  BMI: Body mass index is 31.82 kg/m .   Active diet  order: None     Output: I/O last 3 completed shifts:  In: 2851.31 [I.V.:1051.31; NG/GT:430]  Out: 3390 [Other:2915; Stool:475]     Labs: Recent Labs   Lab 06/02/23  1221 06/02/23  0401   ALBUMIN 3.5 3.7  3.7   HGB 7.4* 7.7*   INR  --  3.05*   WBC 65.9* 70.3*  70.3*     Pressure injury risk assessment:   Sensory Perception: 1-->completely limited  Moisture: 1-->constantly moist  Activity: 1-->bedfast  Mobility: 1-->completely immobile  Nutrition: 3-->adequate  Friction and Shear: 2-->potential problem  Gerardo Score: 9    JOHN Darper  [facility]  Dept. Office Number: [phone]

## 2023-06-02 NOTE — PROGRESS NOTES
Respiratory Therapy Note    [facility] ICU VENTILATOR RESPIRATORY NOTE  Date of Admission: 05/22/23  Date of Intubation (most recent): 05/22/23  Reason for Mechanical Ventilation: Respiratory Failure  Number of Days on Mechanical Ventilation: 12  Met Criteria for Pressure Support Trial: No  Reason for No Pressure Support Trial: FiO2 > 50%  Significant Events Today: PEEP increased to 8 cmH20    Plan:  Continue to monitor patient's respiratory status.    Vent Mode: CMV/AC  (Continuous Mandatory Ventilation/ Assist Control)  FiO2 (%): 70 %  Resp Rate (Set): 15 breaths/min  Tidal Volume (Set, mL): 450 mL  PEEP (cm H2O): 8 cmH2O  Resp: 25    Suctioning small/moderate, red, thin secretions from ETT. RT to follow.    Hanna Stewart, RT  5:23 PM June 2, 2023

## 2023-06-02 NOTE — PROVIDER NOTIFICATION
2020: attempted to call tele hub to report lactic, no answer  2038: notified tele hub RN, Svitlana,  of lactic 4.5. tele RN to update tele MD    0500: notified tele hub regarding pt's WBC and INR rise. Tele hub RN to notify tele MD

## 2023-06-02 NOTE — PROGRESS NOTES
Renal Medicine Progress Note    SHORTHAND KEY FOR MY NOTES:  c = with, s = without, p = after, a = before, x = except, asx = asymptomatic, tx = transplant or treatment, sx = symptoms or symptomatic, cx = canceled or culture, rxn = reaction, yday = yesterday, nl = normal, abx = antibiotics, fxn = function, dx = diagnosis, dz = disease, m/h = melena/hematochezia, c/d/l/ha = cramping/dizziness/lightheadedness/headache, d/c = discharge or diarrhea/constipation, f/c/n/v = fevers/chills/nausea/vomiting, cp/sob = chest pain/shortness of breath, tbv = total body volume, rxn = reaction, tdc = tunneled dialysis catheter, pta = prior to admission, hd = hemodialysis, pd = peritoneal dialysis, hhd = home hemodialysis, edw = estimated dry wt         Assessment/Plan:     1.  Oliguric DANDRE.  Pt remains on CRRT but unable to pull fluid due to hypotension and need for increased pressors.  We will still attempt net zero UF but concern that oxygenation may worsen.  Chemistries are ok, but K is trending higher.  This is prob due to the worsening lactic acidosis.    A.  Change to all K2 baths.  B.  Net zero UF for now.  C.  Follow labs, alexandre K, closely.    2.  Lactic acidosis.  Pt's lactate was up to 6.  This is due to sepsis.  K is slightly higher and we will need to watch this if the lactic acidosis worsens.  A.  Follow lactate, clinically.  B.  Ok to use supplemental bicarb prn.    3.  Severe sepsis syndrome.  WBCs continue to trend higher, overall.  She has a cavitary lung lesion and SBP.  On broad abx and pressor needs have steadily increased throughout the week.  A.  Broad abx at proper doses for CRRT.  B.  Continue norepi.    4.  Resp failure.  Pt remains on the vent and FIO2 is up to 70%.  She has a cavitary lesion of the lung.  A.  Vent mgmt per ICU team.    5.  Anemia / thrombocytopenia.  Hb and plts remain low, but fairly stable.  A.  Follow labs.    6.  Cirrhosis 2 PBC.  Pt's bili is ~16 and she is quite jaundiced.  INR is  high.    A.  Monitor LFTs, INR, clinically.    7.  FEN.  Electrolytes are fine x low Ca and slightly elevated phos.  She is still on TF.  A.  Follow electrolytes daily.    8.  Code Status. Long discussion c pt's  and son at the bedside.  Explained that hope can continue, but there should be a plan for all scenarios including death.  Also explained that if dialysis becomes difficult, then we will need to stop everything as that is currently necessary for life.    A.  Change to DNR.  B.  Will monitor clinically.  Plan to discuss prognosis again c family tmrw.      Case d/w Hong Diggs and Jonathan.        Interval History:     Unable.  Pt is intubated/sedated.          Medications and Allergies:       albumin human  12.5 g Intravenous Q6H     B and C vitamin Complex with folic acid  5 mL Per Feeding Tube Daily     hydrocortisone sodium succinate PF  25 mg Intravenous Q6H     insulin aspart  1-4 Units Subcutaneous Q4H     meropenem  1 g Intravenous Q8H     micafungin  100 mg Intravenous Q24H     pantoprazole  40 mg Per Feeding Tube QAM AC     protein modular  1 packet Per Feeding Tube TID     thyroid  90 mg Oral or Feeding Tube Daily     ursodiol  500 mg Oral BID     vancomycin  1,250 mg Intravenous Q24H      Allergies   Allergen Reactions     Contrast Dye Hives          Physical Exam:     Vitals were reviewed   , Blood pressure 102/45, pulse 85, temperature 97.7  F (36.5  C), temperature source Axillary, resp. rate 23, weight 76.4 kg (168 lb 6.9 oz), SpO2 95 %.  Wt Readings from Last 3 Encounters:   06/02/23 76.4 kg (168 lb 6.9 oz)   05/21/23 72.1 kg (159 lb)   05/16/23 68 kg (150 lb)     Intake/Output Summary (Last 24 hours) at 6/2/2023 1507  Last data filed at 6/2/2023 1400  Gross per 24 hour   Intake 2839.38 ml   Output 2859 ml   Net -19.62 ml     GENERAL APPEARANCE: intubated, sedated  HEENT:  eyes/ears/nose/neck grossly nl x + OG  RESP: + coarse rhonchi, vent sounds  CV: RRR, nl S1/S2   ABDOMEN: +  distended, firm, grimaces to palpation  EXTREMITIES/SKIN: + jaundice, + anasarca  NEURO:  sedated  LINES:  + guerra, + RIJ 2-lumen, + L fem temp HD catheter, + rectal tube, + fem art line    Pt seen on CRRT.  BP dropped so changed to net zero UF.  Good BFR via L fem.          Data:     CBC RESULTS:     Recent Labs   Lab 06/02/23  1221 06/02/23  0401 06/01/23 2004 06/01/23 1233 06/01/23 0417 05/31/23 1946   WBC 65.9* 70.3*  70.3* 68.4* 52.1* 55.2* 49.9*   RBC 2.20* 2.32* 2.32* 2.16* 2.41* 2.37*   HGB 7.4* 7.7* 7.8* 7.1* 7.9* 7.8*   HCT 22.4* 23.1* 23.0* 21.7* 24.1* 23.5*   PLT 60* 66* 70* 62* 35* 31*     Basic Metabolic Panel:  Recent Labs   Lab 06/02/23  1221 06/02/23  1216 06/02/23  0749 06/02/23 0401 06/02/23 0400 06/01/23 2353 06/01/23 2005 06/01/23 2004 06/01/23  1553 06/01/23 1233 06/01/23  0745 06/01/23 0417 05/31/23 2353 05/31/23 1946     --   --  136  136  --   --   --  137  --  135*  --  137  137  --  136   POTASSIUM 5.0  --   --  4.4  4.4  --   --   --  4.5  --  4.1  --  4.7  4.7  --  4.6   CHLORIDE 103  --   --  101  101  --   --   --  103  --  102  --  103  103  --  103   CO2 18*  --   --  19*  19*  --   --   --  18*  --  19*  --  18*  18*  --  19*   BUN 33.6*  --   --  35.9*  35.9*  --   --   --  41.9*  --  33.8*  --  35.4*  35.4*  --  31.3*   CR 0.94  --   --  0.92  0.92  --   --   --  1.06*  --  0.91  --  0.93  0.93  --  0.94   * 151* 168* 177*  177* 159* 151*   < > 192*   < > 172*   < > 160*  177*  177*   < > 148*  163*   SONYA 8.4*  --   --  8.4*  8.4*  --   --   --  8.7*  --  8.0*  --  8.2*  8.2*  --  8.2*    < > = values in this interval not displayed.     INR  Recent Labs   Lab 06/02/23  0401 06/01/23  0417 05/31/23  0515 05/30/23  0404   INR 3.05* 2.97* 2.53* 2.51*      Attestation:   I have reviewed today's relevant vital signs, notes, medications, labs and imaging.    Phillip Recinos MD  [facility] - Nephrology  [phone]

## 2023-06-02 NOTE — PROGRESS NOTES
ICU Attending Note    I have seen and examined Teresa De Santiago, reviewed the patient's history, pertinent labs, vital signs, medications, physical exam, and radiographs.  The patient is critically ill by my examination and requires continued ICU monitoring and cares    Teresa De Santiago is admitted to ICU with septic shock due to e coli bacteremia and spontaneous bacterial peritonitis.  Has primary biliary peritonitis.    Recent Events:  High minute volume, no ventilator issues.    Exam:  Temp:  [96.4  F (35.8  C)-97.9  F (36.6  C)] 97.3  F (36.3  C)  Pulse:  [61-87] 81  Resp:  [15-30] 24  BP: (102-126)/(45-56) 102/45  MAP:  [56 mmHg-90 mmHg] 57 mmHg  Arterial Line BP: ()/(19-59) 92/38  FiO2 (%):  [60 %-100 %] 70 %  SpO2:  [88 %-100 %] 91 %  @I/O last 3 completed shifts:  In: 2851.31 [I.V.:1051.31; NG/GT:430]  Out: 3390 [Other:2915; Stool:475]  Vent Mode: CMV/AC  (Continuous Mandatory Ventilation/ Assist Control)  FiO2 (%): (S) 70 %  Resp Rate (Set): 15 breaths/min  Tidal Volume (Set, mL): 450 mL  PEEP (cm H2O): (S) 8 cmH2O  Resp: 24    Lungs coarse  Heart rrr  abd soft  Ext warm, edema, jaundiced skin      Results:  ABG   Recent Labs   Lab 06/01/23  0417 05/29/23  0603 05/28/23  1901 05/28/23  0633   PH 7.51* 7.42 7.44 7.44   PCO2 26* 37 36 37   PO2 56* 115* 65* 70*   HCO3 20* 24 24 25     CBC  Recent Labs   Lab 06/02/23  0401 06/01/23  2004 06/01/23  1233 06/01/23  0417   WBC 70.3* 68.4* 52.1* 55.2*   HGB 7.7* 7.8* 7.1* 7.9*   HCT 23.1* 23.0* 21.7* 24.1*   PLT 66* 70* 62* 35*     BMP  Recent Labs   Lab 06/02/23  0749 06/02/23  0401 06/02/23  0400 06/01/23  2353 06/01/23  2005 06/01/23  2004 06/01/23  1553 06/01/23  1233 06/01/23  0745 06/01/23  0417   NA  --  136  136  --   --   --  137  --  135*  --  137  137   POTASSIUM  --  4.4  4.4  --   --   --  4.5  --  4.1  --  4.7  4.7   CHLORIDE  --  101  101  --   --   --  103  --  102  --  103  103   CO2  --  19*  19*  --   --   --  18*  --  19*  --  18*   18*   BUN  --  35.9*  35.9*  --   --   --  41.9*  --  33.8*  --  35.4*  35.4*   CR  --  0.92  0.92  --   --   --  1.06*  --  0.91  --  0.93  0.93   * 177*  177* 159* 151*   < > 192*   < > 172*   < > 160*  177*  177*    < > = values in this interval not displayed.     LFT  Recent Labs   Lab 06/02/23  0401 06/01/23 2004 06/01/23  1233 06/01/23  0417 05/31/23  1314 05/31/23  0515 05/30/23  1210 05/30/23  0404   AST 43*  --   --  58*  --  68*  --  62*   ALT 38*  --   --  48*  --  53*  --  46*   ALKPHOS 179*  --   --  221*  --  194*  --  177*   BILITOTAL 16.4*  --   --  16.4*  --  13.9*  --  11.9*   ALBUMIN 3.7  3.7 3.6 3.1* 3.4*  3.4*   < > 2.8*  2.8*   < > 2.7*  2.7*   INR 3.05*  --   --  2.97*  --  2.53*  --  2.51*    < > = values in this interval not displayed.     PancreasNo lab results found in last 7 days.  INR  Lab Results   Component Value Date    INR 3.05 06/02/2023    INR 2.97 06/01/2023    INR 2.53 05/31/2023    INR 2.51 05/30/2023    INR 1.21 12/12/2016    INR 1.14 06/20/2014       Current Issues:    Neuro  o Sedation and Pain control      Respiratory  o Presumed pneumonia with RLL opacity, on antibiotics  o Aminata minute volume and respiratory alkalosis.  This may be secondary to liver failure.      Cardiovascular  o Septic shock in the setting of cirrhosis: vasopressors as needed to maintain MAP.  This portends a poor prognosis.      GI  o Decompensated cirrhosis: supportive care  o Protein calorie malnutrition: tube feeds      Renal  o Acute kidney injury: On CRRT, continue as tolerates      Endo  o Hyperglycemia  o Insulin dependent diabetes      Heme  o Anemia: follow for need to transfuse such as insufficient oxygen delivery  o Thrombocytopenia, no evidence of bleeding at current level  o Leukocytosis: hematology consulting, likely reactive to inflammation/infection.      Infectious Disease  o Pneumonia and SBP: on antibiotics    Updated  and son.    This is an overall picture  of multisystem organ failure involving renal, liver, and pulmonary with other organ system dysfunction (neuro: MRI of stroke, hem: history of CLL). Mortality in this setting is very high.    Evaluation and management time exclusive of procedures was 30 minutes critical care time including:  examination with the ICU team, discussion of the patient's condition with other physicians and members of the care team, reviewing all data related to the patient, and time utilizing the EMR for documentation of this patient's care.      Jarrod Castillo MD  Acute Care Surgery/Critical Care

## 2023-06-02 NOTE — PHARMACY-VANCOMYCIN DOSING SERVICE
Pharmacy Vancomycin Note  Date of Service 2023  Patient's  1950   73 year old, female    Indication: Healthcare-Associated Pneumonia  Day of Therapy: 2  Current vancomycin regimen: Intermittent  Current vancomycin monitoring method: Renal Replacement Therapy  Current vancomycin therapeutic monitoring goal: 15-20 mg/L    Current estimated CrCl = Estimated Creatinine Clearance: 49.4 mL/min (based on SCr of 0.92 mg/dL).    Creatinine for last 3 days  2023: 12:10 PM Creatinine 0.95 mg/dL;  7:56 PM Creatinine 0.96 mg/dL  2023:  5:15 AM Creatinine 0.90 mg/dL;  5:15 AM Creatinine 0.90 mg/dL;  1:14 PM Creatinine 0.88 mg/dL;  7:46 PM Creatinine 0.94 mg/dL  2023:  4:17 AM Creatinine 0.93 mg/dL;  4:17 AM Creatinine 0.93 mg/dL; 12:33 PM Creatinine 0.91 mg/dL;  8:04 PM Creatinine 1.06 mg/dL  2023:  4:01 AM Creatinine 0.92 mg/dL;  4:01 AM Creatinine 0.92 mg/dL    Recent Vancomycin Levels (past 3 days)  2023:  4:01 AM Vancomycin 8.8 ug/mL    Vancomycin IV Administrations (past 72 hours)                   vancomycin (VANCOCIN) 1,500 mg in 0.9% NaCl 250 mL intermittent infusion (mg) 1,500 mg New Bag 23 0858                Nephrotoxins and other renal medications (From now, onward)    Start     Dose/Rate Route Frequency Ordered Stop    23 1500  vancomycin (VANCOCIN) 1,250 mg in 0.9% NaCl 250 mL intermittent infusion         1,250 mg  over 90 Minutes Intravenous EVERY 24 HOURS 23 1153      23 0530  vasopressin 0.2 units/mL in NS (PITRESSIN) standard conc infusion         2.4 Units/hr  12 mL/hr  Intravenous CONTINUOUS 23 0505      23 2000  norepinephrine (LEVOPHED) 16 mg in  mL infusion MAX CONC CENTRAL LINE         0.01-0.6 mcg/kg/min × 72.1 kg  0.7-40.6 mL/hr  Intravenous CONTINUOUS 23 1936               Contrast Orders - past 72 hours (72h ago, onward)    None          Interpretation of levels and current regimen:  Vancomycin level as ordered is of  little utility.     Plan:  1. Patient on CRRT. Initiate Vancomycin 1250mg (16.4mg/kg) Q24H per [facility] Vancomycin Dosing Guide for CRRT  2. Vancomycin monitoring method: Renal Replacement Therapy  3. Vancomycin therapeutic monitoring goal: 15-20 mg/L  4. Pharmacy will check vancomycin levels as appropriate in 1-3 Days.      Angel Carr, Pharm.D., BCPS

## 2023-06-02 NOTE — PROGRESS NOTES
-160s.  BP dropping.  Pt currently on .15 of levophed.  CRRT running but not pulling fluid at this time.    STAT EKG done and Dr Diggs called.  EKG shows AFIB RVR.  Telephone order for 5mg IV Metoprolol ordered.  Will give and change Levophed drip to EL when admitting hospitalist is able to enter orders.

## 2023-06-02 NOTE — PROGRESS NOTES
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 Coli bacteremia, severe lactic acidosis, SBP, multi-organ failure. She has been intubated. She is requiring CRRT for DANDRE and pressors for support of blood pressure.     - Worsening leukocytosis. Diff from yesterday mostly neutrophils so less likely CLL. Repeat diff today pending. More concerning leukocytosis 2/2 infection (neutrophils from 7K-34K)  - Agree with infectious work-up and broad spectrum abx and antifungal agents per primary team  -Transfuse 5U cryoprecipitate for fibrinogen <100 (she is on CRRT, which may be contributing to the refractoriness of cryo transfusion, in addition to ongoing septic shock).  -Transfuse PRBC for Hb </=7.  -Transfuse PLT for PLT </= 20K. Of note she has liver disease with baseline thrombocytopenia and this elevation today is likely reactive to acute illness.       Dr. Curiel saw patient and will be putting in his own note.

## 2023-06-02 NOTE — PROGRESS NOTES
Verbal order to not pull any fluid at this time via CRRT due to increasing pressor needs.  Will re access as shift continues.  Decrease pressors as able.  Cont to monitor.

## 2023-06-02 NOTE — PROGRESS NOTES
[facility] ICU VENTILATOR RESPIRATORY NOTE     Date of Admission: 05/22/2023     Date of Intubation (most recent): 05/22/2023     Reason for Mechanical Ventilation: Respiratory Failure     Number of Days on Mechanical Ventilation: 12     Met Criteria for Pressure Support Trial: No     Reason for No Pressure Support Trial:Fio2 >50%    Received pt on PEEP +5.    ABG Results:  Recent Labs   Lab 06/01/23  0417 05/29/23  0603 05/28/23  1901 05/28/23  0633   PH 7.51* 7.42 7.44 7.44   PCO2 26* 37 36 37   PO2 56* 115* 65* 70*   HCO3 20* 24 24 25   O2PER 100 50 50 50     Vent Mode: CMV/AC  (Continuous Mandatory Ventilation/ Assist Control)  FiO2 (%): 80 %  Resp Rate (Set): 15 breaths/min  Tidal Volume (Set, mL): 450 mL  PEEP (cm H2O): 5 cmH2O  Resp: 26    Bs coarse/diminished. Suctioned for moderate thick red-streaked secretions. Will continue to assess and monitor.    RT Trini on 6/2/2023 at 4:39 AM

## 2023-06-02 NOTE — PROGRESS NOTES
[facility]  Hospitalist Progress Note  Troy Diggs MD 6/2/23    Reason for Stay (Diagnosis): Septic shock, renal failure, respiratory failure, E. coli bacteremia, DIC         Assessment and Plan:      Summary of Stay: Teresa De Santiago is a 73 year old female with history of primary biliary cirrhosis diagnosed 10 years ago, esophageal varices, CLL, anemia, thrombocytopenia, and previous nephrectomy who was admitted on 5/22/2023 after being called back for positive blood cultures for E. coli after initially presenting the day before with malaise and abdominal pain for a few days.    In the ER she received 2 L of IV fluids for hypotension.  She was quite hypoglycemic requiring D50.  She was started on norepinephrine and admitted to the ICU after paracentesis was done showing cloudy and yellow fluid.  She was empirically started on IV vancomycin and IV Zosyn.  She continued to worsen after admission requiring 3 pressors, IV stress dose hydrocortisone, and she had progressive renal failure.  Nephrology was consulted and she was started on CRRT.  She had worsening anemia and thrombocytopenia from baseline along with a low fibrinogen concerning for DIC.  Oncology was consulted and she has received multiple transfusions of cryoprecipitate, FFP, PRBCs, and platelets.  Multiple blood cultures and her ascites fluid is positive for E. coli.    She has had persistent encephalopathy despite holding all sedation.  Noncontrast head CT was unremarkable.  EEG did not show any seizure focus, but generalized slowing consistent with encephalopathy.  Neurology was consulted and recommends a brain MRI which was done and shows a small 3 mm acute to subacute CVA in the right superior frontal gyrus that is unlikely etiology for encephalopathy.  She continues to require low-dose norepinephrine.  CRRT continues and we are attempting to perform some UF for her significant anasarca and we have started IV albumin to assist with  this.  LFTs progressively worsening while here.      IV Zosyn was discontinued after 10 days of antibiotics on 5/31 and started on oral ciprofloxacin SBP prophylaxis, however her WBC count is rising to 70k and she is needing some increased pressor needs so infectious work-up repeated.  C. difficile PCR negative.  CT the abdomen pelvis shows part of the lung field which shows collapse of the left lower lobe and consolidation of the right middle lobe with some cavitation concerning for ongoing infection.  Underwent bronchoscopy 6/1 with multiple lab studies sent.  Underwent paracentesis 6/1 with labs sent as well, ~1,700 neutrophils.  She is started back on IV vancomycin, IV meropenem, and IV micafungin 6/1.      Problem List/Assessment and Plan:   Septic shock  E. coli bacteremia  SBP  Persistent high leukocytosis  Cavitary pneumonia  Acute hypoxemic respiratory failure: Primary symptoms of malaise and abdominal pain for a few days.  Multiple blood cultures and her ascites fluid cultures positive for E. coli consistent with SBP.  Septic shock with renal failure requiring 3 pressors at one point.  Intubated in part due to hypoxia and encephalopathy.  -Intensivist managing ventilator  -Overbreathing ventilator likely in part due to her liver disease.  Blood gas shows respiratory alkalosis so she is overcompensating for her persistent lactic acidosis  -Continues to need norepinephrine  -Received 10 days of IV Zosyn which was discontinued on 5/31 and started on oral ciprofloxacin SBP prophylaxis.  -Leukocytosis now up to peak of 70 (primarily neutrophils although all cell counts up and per oncology this does not represent transformation to ALL and is much more likely secondary to an ongoing infectious/inflammatory process) so infectious work-up restarted with bronchoscopy with studies, paracentesis with studies neutrophils 1,700, blood cultures, C. Difficile PCR negative, and CT the abdomen pelvis that shows collapse of  the left lower lobe and cavitary lesion with consolidation of the right middle lobe concerning for cavitary pneumonia.  May be due to aspiration pneumonia, Aspergillus, etc. in the differential.  -Started on IV vancomycin, IV meropenem, and IV micafungin 6/1  -Continuing IV hydrocortisone 25 mg every 6 hours    Persistent high leukocytosis  CLL  Chronic anemia  Chronic thrombocytopenia  DIC  Fixed coagulopathy: She has required multiple transfusions of cryoprecipitate, FFP, PVCs, and platelets for likely DIC with ongoing low fibrinogen and anemia and thrombocytopenia from baseline.  Received PRBCs 5/24 and 5/25; cryoprecipitate 5/23, 5/24, 5/26, 5/28, 5/30, 5/31, 6/1; platelets 5/24, 5/26, 5/28, 5/30, 6/1; FFP 5/28.  Also, has a persistent leukocytosis above 40 in the setting of CLL although admission WBC count was only 20.  She has been receiving stress dose hydrocortisone which may be contributing.  INR persistently elevated 2.5 in part secondary to fixed coagulopathy from liver disease and also DIC.  Baseline platelet count is around 30.  -Trend CBC with weaning hydrocortisone  -Transfuse RBCs for hemoglobin less than 7   -Transfuse platelets for platelet count less than 20  -Transfuse cryoprecipitate for fibrinogen less than 100  -Oncology reevaluated patient and spoke with family 6/2.  Differential is mostly neutrophils.  This does not represent transition of CLL to ALL and the rising leukocytosis is more likely secondary to the infectious and inflammatory process.    Metabolic and septic encephalopathy  Small acute, subacute CVA: Depressed level of consciousness and ongoing confusion secondary to both septic and metabolic encephalopathy with both liver and renal failure.  Ammonia level not elevated.  Frequent liquid stools via rectal tube.  EEG did not show any seizure activity, just general encephalopathic slowing.  -General neurology was consulted.  Brain MRI was recommended for completeness which shows a  small 3 mm acute to subacute CVA in the right superior frontal gyrus that is unlikely etiology for ongoing encephalopathy.   -Stroke neurology was consulted and recommended MRA head and neck with contrast nonurgently when more stable if improving  -Avoid sedating medications on vent as able, currently on propofol    Primary biliary cirrhosis  History esophageal varices  Anasarca  Hypoalbuminemia: PBC diagnosed 10 years ago and follows with [place] hepatology service.  Per her spouse she was being considered for a living donor transplant at the end of last year, but based on risk benefit and her current quite good quality of life they opted to avoid transplant and the risk that this carries especially with transplant medications.  She is chronically on ursodiol as well as nadolol 40 mg at bedtime, furosemide 20 mg every MWF, omeprazole 40 mg daily, and spironolactone 50 mg daily.    -LFTs continue to rise here in the setting of septic shock and renal failure which portends a poor prognosis, bilirubin is now 16 she has hypoalbuminemia and elevated INR.  -Ursodiol restarted 5/30  -Daily CMP    Lactic acidosis: Persistent lactic acidosis while here in part due to sepsis, renal failure, and liver failure.  Removing fluid with CRRT as able for diffuse anasarca.  On broad-spectrum antibiotics.    DANDRE on CKD stage II  Anasarca  History of nephrectomy  NAGMA: Previous nephrectomy for nonmalignant mass.  Baseline creatinine 1.1-1.2.  Admission creatinine 2.2 here and progressively worsening and then became an uric secondary to septic shock in the setting of liver failure.  Progressive anasarca secondary to sepsis, hypoalbuminemia, renal failure.  -Nephrology consulted  -Continuing CRRT with UF as able secondary to anasarca, limited today due to increasing pressor needs  -Nephrology added 12.5 g 25% IV albumin every 6 hours to help mobilize fluid    Hypothyroidism: Resume thyroid replacement.    Hypokalemia: Replacing per CRRT  "protocol.      DVT Prophylaxis: Pneumatic Compression Devices  Stress ulcer prophylaxis: P.o. Protonix 40 mg daily  Code Status: Patient has been changed to No CPR in event of cardiac arrest, prearrest intubation okay (she is currently intubated) after discussion with patient's spouse, son, and nephrology.  Palliative care consulted during admission.  Prognosis is poor  Lines: ET tube, Cuevas catheter, OG, rectal tube, right IJ CVC, left femoral HD catheter, left femoral arterial line  FEN: Tube feeds, dietitian consulted  Discharge Dispo: TBD  Estimated Disch Date / # of Days until Disch: Remains critical in the ICU on CRRT and intubated.  Anticipate multiple day hospitalization.  Prognosis is poor.    Clinically Significant Risk Factors          # Hypocalcemia: Lowest iCa = 4.3 mg/dL in last 2 days, will monitor and replace as appropriate     # Hypoalbuminemia: Lowest albumin = 2.3 g/dL at 5/22/2023  8:41 AM, will monitor as appropriate  # Coagulation Defect: INR = 3.05 (Ref range: 0.85 - 1.15) and/or PTT = 48 Seconds (Ref range: 22 - 38 Seconds), will monitor for bleeding  # Thrombocytopenia: Lowest platelets = 31 in last 2 days, will monitor for bleeding          # Obesity: Estimated body mass index is 31.82 kg/m  as calculated from the following:    Height as of 5/21/23: 1.549 m (5' 1\").    Weight as of this encounter: 76.4 kg (168 lb 6.9 oz).                       Interval History (Subjective):      Afebrile overnight.  Increased norepinephrine needs today unable to do further UF.  WBC count up to 70 and lactic acid is 6.1.  No urine output.  Continues on broad-spectrum vancomycin, meropenem, micafungin.  C. difficile PCR came back negative.                  Physical Exam:      Last Vital Signs:  /45   Pulse 86   Temp 97.7  F (36.5  C) (Axillary)   Resp 26   Wt 76.4 kg (168 lb 6.9 oz)   SpO2 97%   BMI 31.82 kg/m      Intake/Output Summary (Last 24 hours) at 6/2/2023 1659  Last data filed at 6/2/2023 " 1400  Gross per 24 hour   Intake 2783.28 ml   Output 3074 ml   Net -290.72 ml       Constitutional: Intubated  Eyes: sclera yellow  HEENT: ET tube and OG present  Respiratory: Intubated.  Tachypnea, overbreathing vent.  Anterior lungs clear without focal crackles or wheeze.    Cardiovascular: RRR, no murmur appreciated  GI: Moderately distended, few bowel sounds heard  Skin: Jaundice  Musculoskeletal/extremities: 3+ diffuse anasarca  Neurologic: Sedated on propofol, not following commands  Psychiatric: calm currently on propofol         Medications:      All current medications were reviewed with changes reflected in problem list.         Data:      All new lab and imaging data were personally reviewed.   Labs:  Recent Labs   Lab 06/02/23  1221 06/02/23  0401 06/01/23 2004   WBC 65.9* 70.3*  70.3* 68.4*   HGB 7.4* 7.7* 7.8*   HCT 22.4* 23.1* 23.0*   * 100 99   PLT 60* 66* 70*     Recent Labs   Lab 06/02/23  1221 06/02/23  1216 06/02/23  0749 06/02/23 0401 06/01/23 2005 06/01/23 2004 06/01/23  0745 06/01/23  0417 05/31/23  0815 05/31/23  0515     --   --  136  136  --  137   < > 137  137   < > 137  137   POTASSIUM 5.0  --   --  4.4  4.4  --  4.5   < > 4.7  4.7   < > 4.1  4.1   CHLORIDE 103  --   --  101  101  --  103   < > 103  103   < > 103  103   CO2 18*  --   --  19*  19*  --  18*   < > 18*  18*   < > 20*  20*   ANIONGAP 17*  --   --  16*  16*  --  16*   < > 16*  16*   < > 14  14   * 151* 168* 177*  177*   < > 192*   < > 160*  177*  177*   < > 168*  168*   BUN 33.6*  --   --  35.9*  35.9*  --  41.9*   < > 35.4*  35.4*   < > 27.7*  27.7*   CR 0.94  --   --  0.92  0.92  --  1.06*   < > 0.93  0.93   < > 0.90  0.90   GFRESTIMATED 63  --   --  65  65  --  55*   < > 64  64   < > 66  66   SONYA 8.4*  --   --  8.4*  8.4*  --  8.7*   < > 8.2*  8.2*   < > 7.9*  7.9*   MAG 2.3  --   --  2.2  --  2.2   < > 2.2   < > 2.2   PHOS 5.1*  --   --  4.8*  --  4.9*   < > 4.2   < >  4.2   PROTTOTAL  --   --   --  5.1*  --   --   --  5.0*  --  4.7*   ALBUMIN 3.5  --   --  3.7  3.7  --  3.6   < > 3.4*  3.4*   < > 2.8*  2.8*   BILITOTAL  --   --   --  16.4*  --   --   --  16.4*  --  13.9*   ALKPHOS  --   --   --  179*  --   --   --  221*  --  194*   AST  --   --   --  43*  --   --   --  58*  --  68*   ALT  --   --   --  38*  --   --   --  48*  --  53*    < > = values in this interval not displayed.   Lactic acid 6.1  All cultures:  Recent Labs   Lab 06/01/23  1310 06/01/23  1254 06/01/23  0931   CULTURE No growth, less than 1 day No growth, less than 1 day  Culture negative, monitoring continues No growth after 12 hours  No growth after 12 hours       Imaging:   No imaging today      Troy Diggs MD

## 2023-06-03 NOTE — PROGRESS NOTES
Renal Medicine Progress Note    SHORTHAND KEY FOR MY NOTES:  c = with, s = without, p = after, a = before, x = except, asx = asymptomatic, tx = transplant or treatment, sx = symptoms or symptomatic, cx = canceled or culture, rxn = reaction, yday = yesterday, nl = normal, abx = antibiotics, fxn = function, dx = diagnosis, dz = disease, m/h = melena/hematochezia, c/d/l/ha = cramping/dizziness/lightheadedness/headache, d/c = discharge or diarrhea/constipation, f/c/n/v = fevers/chills/nausea/vomiting, cp/sob = chest pain/shortness of breath, tbv = total body volume, rxn = reaction, tdc = tunneled dialysis catheter, pta = prior to admission, hd = hemodialysis, pd = peritoneal dialysis, hhd = home hemodialysis, edw = estimated dry wt         Assessment/Plan:     1.  Oliguric DANDRE.  Pt is on CRRT, but unable to pull any fluid.  Overnight, she was net + volume due to need for additional fluids in the setting of worsening hypotension.  Chemistries are worse today - bicarb continues to drop.  Despite this, the K is actually dropping.    A.  Change dialysate to K4 and keep the other fluids (pre/post) as K2.  B.  Check K again at 1400.  C.  Follow labs.  D.  Continue net zero UF.    2.  Lactic acidosis.  Pt's lactate is now up to ~12.  K is low (see above).  A.  No bicarb at this time so that we don't worsen the K.  B.  Follow lactate, clinically.    3.  Severe sepsis syndrome.  WBCs are lower, but she is clinically much worse and is now requiring 3 pressors (vaso/phenyl/norepi).  She has fungal peritonitis and a cavitary lung lesion.  She is on broad abx/antifungals.  A.  Continue same meds dosed for CRRT.  B.  Adjust pressors prn.    4.  Resp failure.  Pt remains on the vent and FIO2 is up to 75%.  She has a cavitary lesion of the lung.  In addition, we aren't able to pull fluids, so anything we give can potentially cause pulm edema.  A.  Vent mgmt per ICU team.    5.  DIC.  Plts, fibrin are both lower and INR is much higher  today.  Pt rec'd cryo + blood this AM and will now get some plts.  At risk for spontaneous bleeding.    A.  Follow labs.  B.  Transfuse prn, but will need to be done judiciously given that it's been hard to even remain net zero c CRRT and giving more volume may worsen oxygenation.    6.  Cirrhosis 2 PBC.  Pt's bili has been in the 13-16 range for the past few days.  She is quite jaundiced.  A.  Monitor LFTs, INR, clinically.    7.  Afib c RVR.  Pt went into afib yday.  This is in response to her worsening condition.    A.  Follow clinically.    8.  FEN.  Electrolytes are fine x low K (see above).  TF were stopped today.  A.  Follow electrolytes daily.    9.  Code Status. Long discussion c pt's , different son, son's gf, and Dr. Diggs.  Explained that she is clearly getting worse.  We are continuing the same cares, but it's possible that she may continue to worsen clinically.  If dialysis becomes difficult, then we will need to stop.  A.  DNR.  B.  Encouraged family to have others who want to see her visit.    Case d/w Dr. Diggs.        Interval History:     Unable.  Pt is intubated/sedated.          Medications and Allergies:       albumin human  12.5 g Intravenous Q6H     B and C vitamin Complex with folic acid  5 mL Per Feeding Tube Daily     hydrocortisone sodium succinate PF  50 mg Intravenous Q6H     insulin aspart  1-4 Units Subcutaneous Q4H     meropenem  1 g Intravenous Q8H     micafungin  100 mg Intravenous Q24H     pantoprazole  40 mg Per Feeding Tube QAM AC     thyroid  90 mg Oral or Feeding Tube Daily     ursodiol  500 mg Oral BID     vancomycin  1,250 mg Intravenous Q24H      Allergies   Allergen Reactions     Contrast Dye Hives          Physical Exam:     Vitals were reviewed   , Blood pressure 102/45, pulse 101, temperature (!) 95  F (35  C), temperature source Temporal, resp. rate 23, weight 77.6 kg (171 lb 1.2 oz), SpO2 99 %.  Wt Readings from Last 3 Encounters:   06/03/23 77.6 kg (171 lb  1.2 oz)   05/21/23 72.1 kg (159 lb)   05/16/23 68 kg (150 lb)     Intake/Output Summary (Last 24 hours) at 6/3/2023 1306  Last data filed at 6/3/2023 1300  Gross per 24 hour   Intake 6596.11 ml   Output 975 ml   Net 5621.11 ml     GENERAL APPEARANCE: intubated, sedated  HEENT:  eyes/ears/nose/neck grossly nl x + OG  RESP: + coarse rhonchi, vent sounds  CV: irreg, nl S1/S2   ABDOMEN: + distended, firm  EXTREMITIES/SKIN: + jaundice, + anasarca  NEURO:  sedated  LINES:  + RIJ 2-lumen, + L fem temp HD catheter, + rectal tube, + fem art line    Pt seen on CRRT.  Net zero goal.  On 3 pressors now.  Good BFR via L fem.          Data:     CBC RESULTS:     Recent Labs   Lab 06/03/23  1216 06/03/23  0400 06/02/23 1958 06/02/23  1221 06/02/23  0401 06/01/23 2004   WBC 24.8* 36.5* 69.2* 65.9* 70.3*  70.3* 68.4*   RBC 2.34* 2.07* 2.26* 2.20* 2.32* 2.32*   HGB 7.7* 6.9* 7.6* 7.4* 7.7* 7.8*   HCT 24.1* 22.2* 23.3* 22.4* 23.1* 23.0*   PLT 15* 28* 58* 60* 66* 70*     Basic Metabolic Panel:  Recent Labs   Lab 06/03/23  1216 06/03/23  1052 06/03/23  0801 06/03/23  0400 06/02/23 2000 06/02/23 1958 06/02/23  1617 06/02/23  1221 06/02/23  0749 06/02/23 0401 06/01/23 2005 06/01/23 2004     --   --  138  138  --  137  --  138  --  136  136  --  137   POTASSIUM 3.4  --   --  3.8  3.8  --  4.4  --  5.0  --  4.4  4.4  --  4.5   CHLORIDE 103  --   --  100  100  --  102  --  103  --  101  101  --  103   CO2 13*  --   --  13*  13*  --  17*  --  18*  --  19*  19*  --  18*   BUN 19.4  --   --  24.5*  24.5*  --  31.7*  --  33.6*  --  35.9*  35.9*  --  41.9*   CR 0.66  --   --  0.75  0.75  --  0.87  --  0.94  --  0.92  0.92  --  1.06*   *  120* 109* 84 111*  109*  109*   < > 159*   < > 164*   < > 177*  177*   < > 192*   SONYA 9.3  --   --  8.9  8.9  --  9.0  --  8.4*  --  8.4*  8.4*  --  8.7*    < > = values in this interval not displayed.     INR  Recent Labs   Lab 06/03/23  0400 06/02/23  0401 06/01/23  0417  05/31/23  0515   INR 6.18* 3.05* 2.97* 2.53*      Attestation:   I have reviewed today's relevant vital signs, notes, medications, labs and imaging.    Phillip Recinos MD  [facility] - Nephrology  [phone]

## 2023-06-03 NOTE — PROGRESS NOTES
[facility] ICU VENTILATOR RESPIRATORY NOTE  Date of Admission: 05/22/23  Date of Intubation (most recent): 05/22/23  Reason for Mechanical Ventilation: Respiratory Failure  Number of Days on Mechanical Ventilation: 13  Met Criteria for Pressure Support Trial: No  Reason for No Pressure Support Trial: on pressors and CRRT     Plan:  Continue to monitor patient's respiratory status.  Vent Mode: CMV/AC  (Continuous Mandatory Ventilation/ Assist Control)  FiO2 (%): 75 %  Resp Rate (Set): 15 breaths/min  Tidal Volume (Set, mL): 450 mL  PEEP (cm H2O): 8 cmH2O  Resp: 23         Suctioning small amount of rodrick, thin secretions from ETT. RT will continue to follow.

## 2023-06-03 NOTE — PROGRESS NOTES
Respiratory Therapy Note     [facility] ICU VENTILATOR RESPIRATORY NOTE  Date of Admission: 05/22/23  Date of Intubation (most recent): 05/22/23  Reason for Mechanical Ventilation: Respiratory Failure  Number of Days on Mechanical Ventilation: 13  Met Criteria for Pressure Support Trial: No  Reason for No Pressure Support Trial: FiO2 > 50%     Plan:  Continue to monitor patient's respiratory status.     Vent Mode: CMV/AC  (Continuous Mandatory Ventilation/ Assist Control)  FiO2 (%): 70 %  Resp Rate (Set): 15 breaths/min  Tidal Volume (Set, mL): 450 mL  PEEP (cm H2O): 8 cmH2O  Resp: 25     Suctioning small/moderate, red, thin secretions from ETT. RT to follow.    Tabby Garcia, RT

## 2023-06-03 NOTE — PROGRESS NOTES
Called us for increasing lactic acidosis:  - AGree with trying to wean down Epi gtts.  - Will stop propofol gtts and switch to versed gtts.  - Repeat lactate in 4 hours.    Gene Martinez MD.  Pulmonary and Critical Care.  06/02/2023  9:02 PM    Ongoing (increased) pressor needs. Will bolus LR 1000ml.   - If the next lactic is higher, will discuss with family.    Gene Martinez MD.  06/02/2023  10:19 PM

## 2023-06-03 NOTE — PROGRESS NOTES
[facility] Progress Note  RH      I stopped in ICU before leaving and charge nurse suggested that I might visit with family of Teresa.  She isn't doing well and thought the family may benefit from a visit.    Spouse, Yomi and son, Binu and girlfriend were in the waiting room.  Yomi talked a bit about how Teresa ended up here in the hospital and then asked for a prayer.   I let them know how to contact [facility] should they need more support during this time.     Lolita Hyde MA  Associate   [phone] - pager  [facility] remains available 24/7 for emergent requests/referrals, either by having the on-call  paged or by entering an ASAP/STAT consult in Epic (this will also page the on-call ). Routine Epic consults receive an initial response within 24 hours.

## 2023-06-03 NOTE — PROGRESS NOTES
ICU Attending Note    I have seen and examined Teresa De Santiago, reviewed the patient's history, pertinent labs, vital signs, medications, physical exam, and radiographs.  The patient is critically ill by my examination and requires continued ICU monitoring and cares    Teresa De Santiago is admitted to ICU with septic shock due to e coli bacteremia and spontaneous bacterial peritonitis.  Has primary biliary peritonitis.    Recent Events:  High minute volume, no ventilator issues.    Exam:  Temp:  [94.7  F (34.8  C)-97.7  F (36.5  C)] 95.1  F (35.1  C)  Pulse:  [] 97  Resp:  [9-32] 22  MAP:  [51 mmHg-101 mmHg] 64 mmHg  Arterial Line BP: ()/(35-63) 106/40  FiO2 (%):  [45 %-100 %] 80 %  SpO2:  [88 %-100 %] 99 %  @I/O last 3 completed shifts:  In: 5050.68 [I.V.:2655.68; Other:240; NG/GT:420; IV Piggyback:500]  Out: 1687 [Other:787; Stool:900]  Vent Mode: CMV/AC  (Continuous Mandatory Ventilation/ Assist Control)  FiO2 (%): 80 %  Resp Rate (Set): 15 breaths/min  Tidal Volume (Set, mL): 450 mL  PEEP (cm H2O): 8 cmH2O  Resp: 22    unresponsive  Lungs coarse  Heart rrr  abd soft  Ext warm, edema, jaundiced skin      Results:  ABG   Recent Labs   Lab 06/03/23  0049 06/01/23  0417 05/29/23  0603 05/28/23  1901   PH 7.40 7.51* 7.42 7.44   PCO2 24* 26* 37 36   PO2 62* 56* 115* 65*   HCO3 15* 20* 24 24     CBC  Recent Labs   Lab 06/03/23  0400 06/02/23  1958 06/02/23  1221 06/02/23  0401   WBC 36.5* 69.2* 65.9* 70.3*  70.3*   HGB 6.9* 7.6* 7.4* 7.7*   HCT 22.2* 23.3* 22.4* 23.1*   PLT 28* 58* 60* 66*     BMP  Recent Labs   Lab 06/03/23  1052 06/03/23  0801 06/03/23  0400 06/02/23 2000 06/02/23  1958 06/02/23  1617 06/02/23  1221 06/02/23  0749 06/02/23  0401   NA  --   --  138  138  --  137  --  138  --  136  136   POTASSIUM  --   --  3.8  3.8  --  4.4  --  5.0  --  4.4  4.4   CHLORIDE  --   --  100  100  --  102  --  103  --  101  101   CO2  --   --  13*  13*  --  17*  --  18*  --  19*  19*   BUN  --   --   24.5*  24.5*  --  31.7*  --  33.6*  --  35.9*  35.9*   CR  --   --  0.75  0.75  --  0.87  --  0.94  --  0.92  0.92   * 84 111*  109*  109*   < > 159*   < > 164*   < > 177*  177*    < > = values in this interval not displayed.     LFT  Recent Labs   Lab 06/03/23  0400 06/02/23 1958 06/02/23  1221 06/02/23  0401 06/01/23  1233 06/01/23  0417 05/31/23  1314 05/31/23  0515   AST 47*  --   --  43*  --  58*  --  68*   ALT 34  --   --  38*  --  48*  --  53*   ALKPHOS 160*  --   --  179*  --  221*  --  194*   BILITOTAL 13.7*  --   --  16.4*  --  16.4*  --  13.9*   ALBUMIN 3.4*  3.4* 3.6 3.5 3.7  3.7   < > 3.4*  3.4*   < > 2.8*  2.8*   INR 6.18*  --   --  3.05*  --  2.97*  --  2.53*    < > = values in this interval not displayed.     PancreasNo lab results found in last 7 days.  INR  Lab Results   Component Value Date    INR 3.05 06/02/2023    INR 2.97 06/01/2023    INR 2.53 05/31/2023    INR 2.51 05/30/2023    INR 1.21 12/12/2016    INR 1.14 06/20/2014       Current Issues:    Neuro  o Sedation propofol switched to versed overnight, due to hypotension  Currently unresponsive      Respiratory  o Presumed pneumonia with RLL opacity, on antibiotics  o High minute volume and respiratory alkalosis.  This may be secondary to liver failure.      Cardiovascular  o Septic shock in the setting of cirrhosis: vasopressors as needed to maintain MAP.  This portends a poor prognosis.      GI  o Decompensated cirrhosis: supportive care  o Protein calorie malnutrition: tube feeds      Renal  o Acute kidney injury: On CRRT, continue as tolerates      Endo  o Hyperglycemia  o Insulin dependent diabetes      Heme  o Anemia: follow for need to transfuse such as insufficient oxygen delivery  o Thrombocytopenia, no evidence of bleeding at current level  o Leukocytosis: hematology consulting, likely reactive to inflammation/infection.      Infectious Disease  o Presumed Pneumonia and SBP with bacteremia: on  antibiotics    Updated  and son.    This is an overall picture of multisystem organ failure involving renal, liver, and pulmonary with other organ system dysfunction (neuro: MRI of stroke, hem: history of CLL). Mortality in this setting is likely imminent.     Evaluation and management time exclusive of procedures was 30 minutes critical care time including:  examination with the ICU team, discussion of the patient's condition with other physicians and members of the care team, reviewing all data related to the patient, and time utilizing the EMR for documentation of this patient's care.      Mar Roca MD  Acute Care Surgery/Critical Care

## 2023-06-03 NOTE — PLAN OF CARE
ICU End of Shift Summary.  For vital signs and complete assessments, please see documentation flowsheets.      Pertinent assessments: RASS -4. Flexion to pain only.  Hypothermic. Tele Afib, HR 90-110s. MAPs >60 with Levo, Duy and Vaso. LS coarse with coarse crackles. ETT secretions rodrick. Cloudy thick oral secretions. FiO2 down from 90 to 65%.  Anuric. CRRT continues. Dialysate update to K4 formulation. Loose, yellow stools to rectal tube, 900mL this shift. Skin tears to bilateral arms, wheeping. Increased pitting edema to arms, legs, torso.  Major Shift Events: 1U pRBCs, Platelets given. K replaced. TF stopped due to pressors and elevated lactic. Net I/O +4.6 L since 0000. Positive cultures from bronchial lavage and paracentesis; AmphoB started. MD met with family on multiple occassions through day to discuss pt status.   Plan (Upcoming Events): Attempt to remove fluid (CRRT) without increasing pressor needs with goal of Net I/O =. Recheck labs q8 hrs. Replace electrolytes as needed. Monitor Hgb, platelets, coags.  Discharge/Transfer Needs: TBD     Bedside Shift Report Completed : Y  Bedside Safety Check Completed: Y    CRRT STATUS NOTE    Pressures WNL:  YES  Filter Status:  WDL  Problems Reported/Alarms Noted:  None      ASSESSMENT:  Patient Net Fluid Balance:  +4.6L since 0000. +14.16L since admission.   Unable to achieve Net I/O goal of equal due to persistent high pressor needs. 0.5L removed via CRRT this shift.  Urine status Anuric   Vital Signs: Temp:  [94.7  F (34.8  C)-97  F (36.1  C)] 95.2  F (35.1  C)  Pulse:  [] 108  Resp:  [9-31] 29  MAP:  [54 mmHg-101 mmHg] 62 mmHg  Arterial Line BP: ()/(35-63) 105/42  FiO2 (%):  [45 %-100 %] 75 %  SpO2:  [89 %-100 %] 91 %       Temperature managed with   Blood warmer YES  Forced warm air YES  Blankets NO    Vasoactive gtts: Norepineprine, Vasopressin, Phenylephrine  Goals of Therapy: Per MD at bedside, attempt to pull as much fluid as able without further  escalating pressor needs. Goal to achieve net I/O equal.        INTERVENTIONS/Significant Events: No significant events    PLAN/Meeting Goal:  Continue with CRRT per plan of care. Fluid removal per plan of care. Due for filter change (q 72hrs)  Due at 6/5 or sooner if indicated.     Goal Outcome Evaluation:      Plan of Care Reviewed With: spouse, patient, child    Overall Patient Progress: no changeOverall Patient Progress: no change

## 2023-06-03 NOTE — PROGRESS NOTES
New afib with RVR this evening.  Rates 140-160.   -start IV metoprolol 5mg q 4 hrs prn for rates > 130  -change norepinephrine to phenylephrine gtt  -if not improving with above changes, tele ICU MD will need to follow up tonight

## 2023-06-03 NOTE — PROGRESS NOTES
[facility]  Hospitalist Progress Note  Troy Diggs MD 6/3/23    Reason for Stay (Diagnosis): Septic shock, renal failure, respiratory failure, E. coli bacteremia/SBP, DIC, laminated Aspergillus          Assessment and Plan:      Summary of Stay: Teresa De Santiago is a 73 year old female with history of primary biliary cirrhosis diagnosed 10 years ago, esophageal varices, CLL, anemia, thrombocytopenia, and previous nephrectomy who was admitted on 5/22/2023 after being called back for positive blood cultures for E. coli after initially presenting the day before with malaise and abdominal pain for a few days.    In the ER she received 2 L of IV fluids for hypotension.  She was quite hypoglycemic requiring D50.  She was started on norepinephrine and admitted to the ICU after paracentesis was done showing cloudy and yellow fluid.  She was empirically started on IV vancomycin and IV Zosyn.  She continued to worsen after admission requiring 3 pressors, IV stress dose hydrocortisone, and she had progressive renal failure.  Nephrology was consulted and she was started on CRRT.  She had worsening anemia and thrombocytopenia from baseline along with a low fibrinogen concerning for DIC.  Oncology was consulted and she has received multiple transfusions of cryoprecipitate, FFP, PRBCs, and platelets.  Multiple blood cultures and her ascites fluid is positive for E. coli.    She has had persistent encephalopathy despite holding all sedation.  Noncontrast head CT was unremarkable.  EEG did not show any seizure focus, but generalized slowing consistent with encephalopathy.  Neurology was consulted and recommends a brain MRI which was done and shows a small 3 mm acute to subacute CVA in the right superior frontal gyrus that is unlikely etiology for encephalopathy.  She continues to require low-dose norepinephrine.  CRRT continues and we are attempting to perform some UF for her significant anasarca and we have  started IV albumin to assist with this.  LFTs progressively worsening while here.      IV Zosyn was discontinued after 10 days of antibiotics on 5/31 and started on oral ciprofloxacin SBP prophylaxis, however her WBC count is rising to 70k and she is needing some increased pressor needs so infectious work-up repeated.  C. difficile PCR negative.  CT the abdomen pelvis shows part of the lung field which shows collapse of the left lower lobe and consolidation of the right middle lobe with some cavitation concerning for ongoing infection.  Underwent bronchoscopy 6/1 with multiple lab studies sent.  Underwent paracentesis 6/1 with labs sent as well, ~1,700 neutrophils.  She is started back on IV vancomycin, IV meropenem, and IV micafungin 6/1.  Developed A-fib with RVR evening of 6/2.  Now on 3 vasopressors again.  Peritoneal fluid and bronchoscopy fluid growing Aspergillus fumigatus.  Discussed with ID and family, stopping IV micafungin and starting amphotericin B.    Problem List/Assessment and Plan:   Septic shock  E. coli bacteremia  E. coli SBP  Disseminated aspergillus infection, pneumonia, SBP  Acute hypoxemic respiratory failure: Primary symptoms of malaise and abdominal pain for a few days.  Multiple blood cultures and her ascites fluid cultures positive for E. coli consistent with SBP.  Septic shock with renal failure requiring 3 pressors at one point.  Intubated in part due to hypoxia and encephalopathy.  -Intensivist managing ventilator  -She was overbreathing ventilator likely in part due to her liver disease.    -FiO2 now to 80%  -decompensated overnight 6/2 into 6/3, now on vasopressin, phenylephrine,  -Propofol changed to Versed infusion due to hypotension  -Received 10 days of IV Zosyn which was discontinued on 5/31 and started on oral ciprofloxacin SBP prophylaxis.  -Leukocytosis now up to peak of 70 (primarily neutrophils although all cell counts up and per oncology this does not represent  transformation to ALL and is much more likely secondary to an ongoing infectious/inflammatory process) so infectious work-up restarted with bronchoscopy with studies, paracentesis with studies neutrophils 1,700, blood cultures, C. Difficile PCR negative, and CT the abdomen pelvis that shows collapse of the left lower lobe and cavitary lesion with consolidation of the right middle lobe concerning for cavitary pneumonia.   -On IV vancomycin, IV meropenem, and IV micafungin 6/1  -Peritoneal fluid and bronchoscopy fluid growing Aspergillus fumigatus.  Although standard blood cultures negative presumably must be fungal make.  Discussed with ID and discussed risk factors/toxicity with family, stopping IV micafungin and starting amphotericin B.  -WBC count down, however now on 3 pressors with a lactic acid 11 so the other factors would indicate worsening infection  -Continuing IV hydrocortisone 25 mg every 6 hours    Persistent high leukocytosis  CLL  Chronic anemia  Chronic thrombocytopenia  DIC  Fixed coagulopathy: She has required multiple transfusions of cryoprecipitate, FFP, PVCs, and platelets for likely DIC with ongoing low fibrinogen and anemia and thrombocytopenia from baseline.  Received PRBCs 5/24 and 5/25; cryoprecipitate 5/23, 5/24, 5/26, 5/28, 5/30, 5/31, 6/1; platelets 5/24, 5/26, 5/28, 5/30, 6/1; FFP 5/28.  Also, has a persistent leukocytosis above 40 in the setting of CLL although admission WBC count was only 20.  She has been receiving stress dose hydrocortisone which may be contributing.  INR persistently elevated 2.5 in part secondary to fixed coagulopathy from liver disease and also DIC.  Baseline platelet count is around 30.  -Transfuse RBCs for hemoglobin less than 7   -Transfuse platelets for platelet count less than 20  -Transfuse cryoprecipitate for fibrinogen less than 100  -Transfused 1 unit RBCs, 1 unit platelets, cryoprecipitate today  -Oncology reevaluated patient and spoke with family 6/2.   Differential is mostly neutrophils.  This does not represent transition of CLL to ALL and the rising leukocytosis is more likely secondary to the infectious and inflammatory process.    Metabolic and septic encephalopathy  Small acute, subacute CVA: Depressed level of consciousness and ongoing confusion secondary to both septic and metabolic encephalopathy with both liver and renal failure.  Ammonia level not elevated.  Frequent liquid stools via rectal tube.  EEG did not show any seizure activity, just general encephalopathic slowing.  -General neurology was consulted.  Brain MRI was recommended for completeness which shows a small 3 mm acute to subacute CVA in the right superior frontal gyrus that is unlikely etiology for ongoing encephalopathy.   -Stroke neurology was consulted and recommended MRA head and neck with contrast nonurgently when more stable if improving  -Propofol changed to Versed due to hypotension    Primary biliary cirrhosis  History esophageal varices  Anasarca  Hypoalbuminemia: PBC diagnosed 10 years ago and follows with [place] hepatology service.  Per her spouse she was being considered for a living donor transplant at the end of last year, but based on risk benefit and her current quite good quality of life they opted to avoid transplant and the risk that this carries especially with transplant medications.  She is chronically on ursodiol as well as nadolol 40 mg at bedtime, furosemide 20 mg every MWF, omeprazole 40 mg daily, and spironolactone 50 mg daily.    -LFTs continue to rise here in the setting of septic shock and renal failure which portends a poor prognosis, bilirubin is now 16 she has hypoalbuminemia and elevated INR.  -Ursodiol restarted 5/30  -Daily CMP    Lactic acidosis: Persistent lactic acidosis while here in part due to sepsis, renal failure, and liver failure.  Removing fluid with CRRT as able for diffuse anasarca.  On broad-spectrum antibiotics.    DANDRE on CKD stage  II  Anasarca  History of nephrectomy  NAGMA: Previous nephrectomy for nonmalignant mass.  Baseline creatinine 1.1-1.2.  Admission creatinine 2.2 here and progressively worsening and then became an uric secondary to septic shock in the setting of liver failure.  Progressive anasarca secondary to sepsis, hypoalbuminemia, renal failure.  -Nephrology consulted  -Continuing CRRT, unable to pull fluid due to being on 3 pressors    Hypothyroidism: Resume thyroid replacement.    Hypokalemia: Replacing per CRRT protocol.      DVT Prophylaxis: Pneumatic Compression Devices  Stress ulcer prophylaxis: P.o. Protonix 40 mg daily  Code Status: Patient has been changed to No CPR in event of cardiac arrest, prearrest intubation okay (she is currently intubated) after discussion with patient's spouse, son, and nephrology.  Palliative care consulted during admission.  Prognosis is poor  Lines: ET tube, Cuevas catheter, OG, rectal tube, right IJ CVC, left femoral HD catheter, left femoral arterial line  FEN: Tube feeds currently held due to being on 3 pressors and severely elevated lactic acid.  Dietitian is following.  Discharge Dispo: TBD  Estimated Disch Date / # of Days until Disch: Remains critical in the ICU on CRRT and intubated.  Anticipate multiple day hospitalization.  Prognosis is poor.    Clinically Significant Risk Factors          # Hypocalcemia: Lowest Ca = 8.4 mg/dL in last 2 days, will monitor and replace as appropriate    # Anion Gap Metabolic Acidosis: Highest Anion Gap = 25 mmol/L in last 2 days, will monitor and treat as appropriate  # Hypoalbuminemia: Lowest albumin = 2.3 g/dL at 5/22/2023  8:41 AM, will monitor as appropriate  # Coagulation Defect: INR = 6.18 (Ref range: 0.85 - 1.15) and/or PTT = 78 Seconds (Ref range: 22 - 38 Seconds), will monitor for bleeding  # Thrombocytopenia: Lowest platelets = 15 in last 2 days, will monitor for bleeding          # Obesity: Estimated body mass index is 32.32 kg/m  as  "calculated from the following:    Height as of 5/21/23: 1.549 m (5' 1\").    Weight as of this encounter: 77.6 kg (171 lb 1.2 oz).                       Interval History (Subjective):      Now on 3 vasopressors due to hypotension.  Unable to pull any fluid CRRT.  A-fib with RVR yesterday evening, rates better controlled currently.  Severely elevated lactic acid.  Peritoneal fluid culture growing Aspergillus.  Transfused RBCs, cryoprecipitate, platelets today.                  Physical Exam:      Last Vital Signs:  /45   Pulse 100   Temp (!) 95.2  F (35.1  C) (Temporal)   Resp 23   Wt 77.6 kg (171 lb 1.2 oz)   SpO2 97%   BMI 32.32 kg/m      Intake/Output Summary (Last 24 hours) at 6/2/2023 1425  Last data filed at 6/2/2023 1400  Gross per 24 hour   Intake 2783.28 ml   Output 3074 ml   Net -290.72 ml       Constitutional: Intubated  Eyes: sclera yellow  HEENT: ET tube and OG present  Respiratory: Intubated.  Tachypneic, posterior crackles, no wheeze  Cardiovascular: RRR, no murmur appreciated  GI: Moderately distended, few bowel sounds heard  Skin: Jaundice  Musculoskeletal/extremities: 3+ diffuse anasarca  Neurologic: Sedated on Versed         Medications:      All current medications were reviewed with changes reflected in problem list.         Data:      All new lab and imaging data were personally reviewed.   Labs:  Recent Labs   Lab 06/03/23 1216 06/03/23  0400 06/02/23 1958   WBC 24.8* 36.5* 69.2*   HGB 7.7* 6.9* 7.6*   HCT 24.1* 22.2* 23.3*   * 107* 103*   PLT 15* 28* 58*     Recent Labs   Lab 06/03/23  1446 06/03/23  1216 06/03/23  0801 06/03/23  0400 06/02/23 2000 06/02/23 1958 06/02/23  0749 06/02/23  0401 06/01/23  0745 06/01/23  0417    139  --  138  138  --  137   < > 136  136   < > 137  137   POTASSIUM 3.4 3.4  --  3.8  3.8  --  4.4   < > 4.4  4.4   < > 4.7  4.7   CHLORIDE 101 103  --  100  100  --  102   < > 101  101   < > 103  103   CO2 13* 13*  --  13*  13*  --  " 17*   < > 19*  19*   < > 18*  18*   ANIONGAP 24* 23*  --  25*  25*  --  18*   < > 16*  16*   < > 16*  16*   * 125*  120*   < > 111*  109*  109*   < > 159*   < > 177*  177*   < > 160*  177*  177*   BUN 19.0 19.4  --  24.5*  24.5*  --  31.7*   < > 35.9*  35.9*   < > 35.4*  35.4*   CR 0.63 0.66  --  0.75  0.75  --  0.87   < > 0.92  0.92   < > 0.93  0.93   GFRESTIMATED >90 >90  --  83  83  --  69   < > 65  65   < > 64  64   SONYA 9.3 9.3  --  8.9  8.9  --  9.0   < > 8.4*  8.4*   < > 8.2*  8.2*   MAG  --  2.1  --  1.8  --  2.1   < > 2.2   < > 2.2   PHOS 3.8 3.7  --  4.1  --  4.5   < > 4.8*   < > 4.2   PROTTOTAL  --   --   --  4.3*  --   --   --  5.1*  --  5.0*   ALBUMIN 3.6 3.2*  --  3.4*  3.4*  --  3.6   < > 3.7  3.7   < > 3.4*  3.4*   BILITOTAL  --   --   --  13.7*  --   --   --  16.4*  --  16.4*   ALKPHOS  --   --   --  160*  --   --   --  179*  --  221*   AST  --   --   --  47*  --   --   --  43*  --  58*   ALT  --   --   --  34  --   --   --  38*  --  48*    < > = values in this interval not displayed.   Lactic acid 12.4 with repeat 11.4  All cultures:  Peritoneal fluid culture: Aspergillus fumigatus  Bronchoscopy fluid: Aspergillus fumigatus    Imaging:   No imaging today      Troy Diggs MD

## 2023-06-03 NOTE — PROGRESS NOTES
Clinical Nutrition Brief Note     Please refer to previous RD notes for more information.     Noted that patient was restarted on 3 pressors and has a critical lactic acid lab value of 12.4. This was mentioned in rounds and upon further discussion, plan to turn off TF for now.      Recommendations:   Discontinue TF orders and modular, RD to reassess daily     Rahel Richardson RD, LD  Clinical Dietitian     3rd floor/ICU: [phone]  All other floors: [phone]  Weekend/holiday: [phone]  Office: [phone]

## 2023-06-03 NOTE — PLAN OF CARE
ICU End of Shift Summary.  For vital signs and complete assessments, please see documentation flowsheets.      Pertinent assessments: Patient remains intubated and sedated. RASS -2/-3 on low dose Versed. Pupils equal, round, reactive, however progressively dilating throughout shift. Patient persistently hypotensive despite being on three pressors. MAP goal 60. Tele Afib RVR - rate improved. FIO2 needs also increasing. Patient anuric. Rectal tube with significant output - see I&O flowsheet for details. Skin continues to be edematous and weeping. Patient no longer tolerating positioning changes due to hypotension - MD aware.    Major Shift Events: Third pressor added, unable to remove fluid via CRRT, multiple critical labs.   Plan (Upcoming Events): Monitor labs, wean pressors as able.   Discharge/Transfer Needs: TBD     Bedside Shift Report Completed: Y   Bedside Safety Check Completed: Y    CRRT STATUS NOTE    Pressures WNL:  YES  Filter Status:  WDL  Problems Reported/Alarms Noted:  N/A    ASSESSMENT:  Patient Net Fluid Balance: Net positive. Unable to remove any fluid for entire shift due to constant hypotension, despite additional pressors & fluids.    Vital Signs: Temp:  [96.8  F (36  C)-97.7  F (36.5  C)] 97  F (36.1  C)  Pulse:  [] 108  Resp:  [9-32] 26  BP: (102)/(45) 102/45  MAP:  [51 mmHg-101 mmHg] 64 mmHg  Arterial Line BP: ()/(19-63) 107/46  FiO2 (%):  [60 %-85 %] 85 %  SpO2:  [88 %-100 %] 92 %     Temperature managed with   Blood warmer YES  Forced warm air YES  Blankets YES    Goals of Therapy: Net even.     INTERVENTIONS/Significant Events: Unable to remove fluid. Mag replaced x1.    PLAN/Meeting Goal: Continue with CRRT per plan of care. Fluid removal per plan of care. Due for filter change 6/5/2023 at 1600 or sooner if indicated.     Goal Outcome Evaluation:  Plan of Care Reviewed With: spouse  Overall Patient Progress: declining

## 2023-06-04 NOTE — CONSULTS
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 Now has worsening sepsis, increased pressor needs and vent requirement , hypothermia and severe lactic acidosis related to disseminated Aspergillosis - has been found to have new onset pulmonary cavitary lesion and Aspergillus fumigatus peritonitis.     -Disseminated Aspergillus fumigatus infection in the setting of severe immunocompromise  -Aspergillus fumigatus peritonitis -SBP, prior E.coli SBP during this admission  -Dense lobar consolidation, pulmonary nodular lesions and RML pulmonary cavitary lesion  -Severe jaundice and hyperbilirubinemia  -Severe lactic acidosis  -hypoxic respiratory failure on vent  -Leukocytosis and thrombocytopenia  -DIC, coagulopathy  -CLL  -Primary biliary cirrhosis    Recommendations:  1. Patient has disseminated Aspergillosis, she is severely immunocompromised and in severe septic shock with multi organ failure. Unlikely to recover from this  2. Continue Ambisome and Meropenem, discontinue Vancomycin  3. Closely monitor renal functions and electrolytes while on Ambisone  4. Taper steroids if possible  5. 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    She was intubated and required pressors, treated with antibiotics and steroids.She has now developed worsening lactic acidosis, leukocytosis and increased vent requirements. Peritoneal fluid cx has now grown aspergillus fumigatus. CT abdomen pelvis shows bilateral patchy consolidative pulmonary opacities  including cavitary opacities in the right middle lobe and complete collapse of the left lower lobe. BAL cx also grew Aspergillus fumigatus.    Patient was maintained on meropenem and Micafungin and has now been initiated on Liposomal Amphotericin.  ID has been asked to assist with further management.    Past Medical History   I have reviewed this patient's medical history and updated it with pertinent information if needed.   Past Medical History:   Diagnosis Date     Cirrhosis, primary biliary (H)      Thyroid disease 20's       Past Surgical History   I have reviewed this patient's surgical history and updated it with pertinent information if needed.  Past Surgical History:   Procedure Laterality Date     GALLBLADDER SURGERY       MASTECTOMY  1994     NEPHRECTOMY         Prior to Admission Medications   Prior to Admission Medications   Prescriptions Last Dose Informant Patient Reported? Taking?   Fish Oil-Cholecalciferol (OMEGA-3 FISH OIL/VITAMIN D3 PO) 5/21/2023  Yes Yes   Sig: Take by mouth daily   QC ZINC PO 5/21/2023  Yes Yes   Sig: Take by mouth 2 times daily   VITAMIN A PO   Yes Yes   Sig: Take by mouth daily   furosemide (LASIX) 20 MG tablet 5/19/2023 at AM  Yes Yes   Sig: Take 20 mg by mouth Every Mon, Wed, Fri  Morning   nadolol (CORGARD) 20 MG tablet 5/21/2023 at PM  Yes Yes   Sig: Take 40 mg by mouth At Bedtime   omeprazole (PRILOSEC) 40 MG DR capsule Past Week  No Yes   Sig: Take 1 capsule (40 mg) by mouth daily for 30 days   spironolactone (ALDACTONE) 50 MG tablet 5/21/2023 at AM  Yes Yes   Sig: Take 50 mg by mouth daily   thyroid (ARMOUR) 90 MG tablet 5/21/2023  Yes Yes   Sig: Take 90 mg by mouth daily   ursodiol (ACTIGALL) 500 MG tablet 5/21/2023 at x2  Yes Yes   Sig: Take 500 mg by mouth 2 times daily      Facility-Administered Medications: None     Allergies   Allergies   Allergen Reactions     Contrast Dye Hives       Immunization History   Immunization History   Administered Date(s) Administered     COVID-19 Bivalent 18+ (Moderna) 10/20/2022     COVID-19 Monovalent 18+ (Moderna) 05/24/2022     TDAP (Adacel,Boostrix) 09/23/2010       Social History   I have reviewed this patient's social history and updated it with pertinent information if needed. Teresa De Santiago  reports that she has never smoked. She has never used smokeless tobacco. She reports that she does not drink alcohol and does not use drugs.    Family History   I have reviewed this patient's family history and updated it with pertinent information if needed.   Family History   Problem Relation Age of Onset     Cancer Mother      Heart Disease Mother      Osteoporosis Mother      Cerebrovascular Disease Mother      Seizure Disorder Mother      Thyroid Disease Mother      High cholesterol Other         sibling     Thyroid Disease Other         sibling       Review of Systems   Cannot be obtained    Physical Exam   Temp: (!) 94.9  F (34.9  C) Temp src: Temporal   Pulse: 117   Resp: 15 SpO2: (!) 88 % O2 Device: Mechanical Ventilator    Vital Signs with Ranges  Temp:  [94.9  F (34.9  C)-96  F (35.6  C)] 94.9  F (34.9  C)  Pulse:  [] 117  Resp:  [14-32] 15  MAP:  [50 mmHg-86 mmHg] 53 mmHg  Arterial Line BP: ()/(32-90) 117/36  FiO2 (%):  [65 %-100  %] 100 %  SpO2:  [75 %-100 %] 88 %  171 lbs 1.23 oz  Body mass index is 32.32 kg/m .    GENERAL APPEARANCE:  Intubated, deeply jaundiced, hypothermic in a warming blanket  EYES: scleral icterus  RESP: lungs clear   CV: regular rates and rhythm  ABDOMEN: soft, nontender  MS: trace edema  SKIN: excoriations on legs, jaundiced skin    Data   All laboratory data reviewed  Component      Latest Ref [facility] 6/4/2023  4:12 AM   Lactic Acid      0.7 - 2.0 mmol/L 13.6 (HH)       Component      Latest Ref [facility] 6/4/2023  4:12 AM   WBC      4.0 - 11.0 10e3/uL 14.9 (H)    RBC Count      3.80 - 5.20 10e6/uL 2.62 (L)    Hemoglobin      11.7 - 15.7 g/dL 8.8 (L)    Hematocrit      35.0 - 47.0 % 27.4 (L)    MCV      78 - 100 fL 105 (H)    MCH      26.5 - 33.0 pg 33.6 (H)    MCHC      31.5 - 36.5 g/dL 32.1    RDW      10.0 - 15.0 % 26.6 (H)    Platelet Count      150 - 450 10e3/uL 36 (LL)       Component      Latest Ref [facility] 6/4/2023  4:12 AM   Sodium      136 - 145 mmol/L 139    Potassium      3.4 - 5.3 mmol/L 4.0    Chloride      98 - 107 mmol/L 103    Carbon Dioxide (CO2)      22 - 29 mmol/L 13 (L)    Anion Gap      7 - 15 mmol/L 23 (H)    Glucose      70 - 99 mg/dL 47 (LL)    Urea Nitrogen      8.0 - 23.0 mg/dL 14.6    Creatinine      0.51 - 0.95 mg/dL 0.54    GFR Estimate      >60 mL/min/1.73m2 >90    Calcium      8.8 - 10.2 mg/dL 9.5    Albumin      3.5 - 5.2 g/dL 3.5    Phosphorus      2.5 - 4.5 mg/dL 4.7 (H)      Microbiology  6/1 abdominal ascites  Abdomen; Ascites Fluid    0 Result Notes  Culture 1+ Aspergillus fumigatus         6/1 BAL  Bronchus, Right; Bronchial Alveolar Lavage    0 Result Notes  Culture <10,000 CFU/mL Aspergillus fumigatus Abnormal         6/1 RML  Lung, Middle Lobe, Right; Bronchial Alveolar Lavage    0 Result Notes  Culture Aspergillus fumigatus         5/22 abdominal ascites  Abdomen; Ascites Fluid    0 Result Notes  Culture 1+ Escherichia coli Abnormal             Resulting Agency: [facility]      Susceptibility     Escherichia coli     KAREL     Ampicillin 8 ug/mL Susceptible     Ampicillin/ Sulbactam 4 ug/mL Susceptible     Cefepime <=1 ug/mL Susceptible     Ceftazidime <=1 ug/mL Susceptible     Ceftriaxone <=1 ug/mL Susceptible     Ciprofloxacin <=0.25 ug/mL Susceptible     Gentamicin <=1 ug/mL Susceptible     Levofloxacin 0.25 ug/mL Susceptible     Meropenem <=0.25 ug/mL Susceptible     Piperacillin/Tazobactam <=4 ug/mL Susceptible     Tobramycin <=1 ug/mL Susceptible     Trimethoprim/Sulfamethoxazole <=1/19 ug/mL Susceptible                 Blood cx 5/21, 5/22 e.coli  Peripheral Blood    1 Result Note  Culture Positive on the 1st day of incubation Abnormal        Escherichia coli Panic     2 of 2 bottles        Resulting Agency: [facility]     Susceptibility     Escherichia coli     KAREL     Ampicillin 8 ug/mL Susceptible     Ampicillin/ Sulbactam 4 ug/mL Susceptible     Cefepime <=1 ug/mL Susceptible     Ceftazidime <=1 ug/mL Susceptible     Ceftriaxone <=1 ug/mL Susceptible     Ciprofloxacin <=0.25 ug/mL Susceptible     Gentamicin <=1 ug/mL Susceptible     Levofloxacin 0.25 ug/mL Susceptible     Meropenem <=0.25 ug/mL Susceptible     Piperacillin/Tazobactam <=4 ug/mL Susceptible     Tobramycin <=1 ug/mL Susceptible     Trimethoprim/Sulfamethoxazole <=1/19 ug/mL Susceptible                 Imaging  6/1 Ct abdomen pelvis  CT ABDOMEN PELVIS WITHOUT CONTRAST  6/1/2023 11:30 AM     HISTORY:  Worsening sepsis and increased WBC.     TECHNIQUE: CT scan obtained of the abdomen, and pelvis without IV  contrast. Radiation dose for this scan was reduced using automated  exposure control, adjustment of the mA and/or kV according to patient  size, or iterative reconstruction technique.     COMPARISON: CT abdomen and pelvis on 5/21/2023.     FINDINGS:     Lower chest: Small right pleural effusion and associated basilar  atelectasis/consolidation. Complete consolidation of the left lower  lobe, and patchy consolidative  pulmonary opacities predominantly in  the right middle lingula with right middle lobe cavitation.     Abdomen/pelvis:Limited evaluation of the abdominal organs due to lack  of intravenous contrast, within this limitation, there is;     Hepatobiliary: Cirrhotic liver morphology. The gallbladder is  surgically absent.     Pancreas: The unenhanced pancreas is grossly unremarkable.     Spleen: The spleen is enlarged measuring 13.7 cm in craniocaudal  dimension.     Adrenal glands: No left adrenal nodule. The right adrenal nodule is  not visualized, likely surgically absent.     Kidneys: The right kidney is not visualized, likely surgically absent.  2 mm stone at the interpolar region of the left kidney. No left  ureteral stone or hydronephrosis.     Bowel: No abnormally dilated bowel loops. There is an enteric tube  with distal tip in the distal stomach. There is a rectal tube.     Peritoneum: Moderate amount of free fluid in the abdomen and pelvis,  not significantly changed as compared to 5/21/2023 exam. No free  peritoneal or portal venous gas.     Pelvic organs: Small focus of gas within the urinary bladder, could be  related to recent catheterization.     Vascular: There is a left femoral approach catheter with the tip in  the left external iliac artery. There is a left femoral approach  venous catheter with the tip in the distal aspect of the left common  iliac artery. Enlarged venous collaterals in the upper abdomen.     Lymph nodes: Multiple mildly enlarged upper abdominal lymph nodes,  likely reactive.     Bones and soft tissue: No suspicious osseous lesion. Generalized  anasarca.                                                                      IMPRESSION:   1. There is complete collapse of the left lower lobe, new as compared  to 5/21/2023 exam. Bilateral patchy consolidative pulmonary opacities  including cavitary opacities in the right middle lobe, new as compared  to 5/21/2023 exam, likely infectious.  Small right pleural effusion and  associated basilar atelectasis/consolidation  2. Cirrhotic liver morphology with evidence of portal hypertension  including splenomegaly, enlarged portosystemic collaterals and  moderate volume ascites.

## 2023-06-04 NOTE — PROGRESS NOTES
CRRT STATUS NOTE  Pressures WNL:  YES  Filter Status:  WDL    Problems Reported/Alarms Noted:  None    ASSESSMENT:  Patient Net Fluid Balance:  Net +   Urine status: Anuric   Vital Signs: Temp:  [94.9  F (34.9  C)-96  F (35.6  C)] 94.9  F (34.9  C)  Pulse:  [0-142] 27  Resp:  [] 15  MAP:  [17 mmHg-86 mmHg] 25 mmHg  Arterial Line BP: ()/(15-90) 55/20  FiO2 (%):  [65 %-100 %] 100 %  SpO2:  [58 %-100 %] 89 %       Temperature managed with   Blood warmer YES  Forced warm air YES  Blankets YES    Vasoactive gtts: Levophed, Vasopressin, Phenyephrine  Goals of Therapy: I/O =       INTERVENTIONS/Significant Events: Profound hypotension despite max dosing of three pressors. Unable to meet therapy goals of I=O due to hypotension, MD aware.    PLAN/Meeting Goal: Per nephrology CRRT discontinued at 0932, treatment ineffective with profound hypotension. Blood returned.

## 2023-06-04 NOTE — DISCHARGE SUMMARY
[facility]  Discharge Summary  Name: Teresa De Santiago    MRN: [id number]  YOB: 1950    Age: 73 year old  Date of Discharge:  6/4/2023  Date of Admission: 5/22/2023  Primary Care Provider: Sally Cool  Discharge Physician:  Troy Diggs MD  Discharging Service:  Hospitalist      Discharge Diagnoses:  Septic shock  Disseminated Aspergillus fumigatus infection  E. coli SBP and bacteremia  Acute hypoxemic respiratory failure  Anuric renal failure  DIC  Lactic acidosis  Metabolic and septic encephalopathy  Acute, subacute CVA  Primary biliary cirrhosis  CLL  Acute on chronic anemia  Acute on chronic thrombocytopenia  Fixed coagulopathy  Hypoalbuminemia  Anasarca  History esophageal varices  History of nephrectomy  Hypothyroidism  Hypokalemia     Hospital Course:  Summary of Stay: Teresa De Santiago is a 73 year old female with history of primary biliary cirrhosis diagnosed 10 years ago, esophageal varices, CLL, anemia, thrombocytopenia, and previous nephrectomy who was admitted on 5/22/2023 after being called back for positive blood cultures for E. coli after initially presenting the day before with malaise and abdominal pain for a few days.     In the ER she received 2 L of IV fluids for hypotension.  She was quite hypoglycemic requiring D50.  She was started on norepinephrine and admitted to the ICU after paracentesis was done showing cloudy and yellow fluid.  She was empirically started on IV vancomycin and IV Zosyn.  She continued to worsen after admission requiring 3 pressors, IV stress dose hydrocortisone, and she had progressive renal failure.  Nephrology was consulted and she was started on CRRT.  She had worsening anemia and thrombocytopenia from baseline along with a low fibrinogen concerning for DIC.  Oncology was consulted and she has received multiple transfusions of cryoprecipitate, FFP, PRBCs, and platelets.  Multiple blood cultures and her ascites fluid is positive for E.  coli.     She has had persistent encephalopathy despite holding all sedation.  Noncontrast head CT was unremarkable.  EEG did not show any seizure focus, but generalized slowing consistent with encephalopathy.  Neurology was consulted and recommends a brain MRI which was done and shows a small 3 mm acute to subacute CVA in the right superior frontal gyrus that is unlikely etiology for encephalopathy.  She continues to require low-dose norepinephrine.  CRRT continues and we are attempting to perform some UF for her significant anasarca and we have started IV albumin to assist with this.  LFTs progressively worsening while here.       IV Zosyn was discontinued after 10 days of antibiotics on  and started on oral ciprofloxacin SBP prophylaxis, however her WBC count is rising to 70k and she is needing some increased pressor needs so infectious work-up repeated.  C. difficile PCR negative.  CT the abdomen pelvis shows part of the lung field which shows collapse of the left lower lobe and consolidation of the right middle lobe with some cavitation concerning for ongoing infection.  Underwent bronchoscopy  with multiple lab studies sent.  Underwent paracentesis  with labs sent as well, ~1,700 neutrophils.  She is started back on IV vancomycin, IV meropenem, and IV micafungin .  Developed A-fib with RVR evening of .  Now on 3 vasopressors again.  Peritoneal fluid and bronchoscopy fluid growing Aspergillus fumigatus.  Discussed with ID and family, stopped IV micafungin and started amphotericin B 6/3.  Progressive severe lactic acidosis with pH falling below 7 on maxed out 3 vasopressors over the next 24 hours and despite this she remained hypotensive.  No longer able to continue CRRT which was discontinued.  Patient provided some comfort medications and over the course the day blood pressure dropped until she went into asystole and  with family at the bedside.     Problem List/Assessment and Plan:    Septic shock  E. coli bacteremia  E. coli SBP  Disseminated Aspergillus fumigatus infection, pneumonia, SBP  Acute hypoxemic respiratory failure: Primary symptoms of malaise and abdominal pain for a few days.  Multiple blood cultures and her ascites fluid cultures positive for E. coli consistent with SBP.  Septic shock with renal failure requiring 3 pressors at one point.  Intubated in part due to hypoxia and encephalopathy.  -Intensivist managing the ventilator  -She was overbreathing ventilator likely in part due to her liver disease.    -FiO2 now to 80%  -decompensated overnight 6/2 into 6/3, now on vasopressin, phenylephrine,  -Propofol changed to Versed infusion due to hypotension  -Received 10 days of IV Zosyn which was discontinued on 5/31 and started on oral ciprofloxacin SBP prophylaxis.  -Leukocytosis now up to peak of 70 (primarily neutrophils although all cell counts up and per oncology this does not represent transformation to ALL and is much more likely secondary to an ongoing infectious/inflammatory process) so infectious work-up restarted with bronchoscopy with studies, paracentesis with studies neutrophils 1,700, blood cultures, C. Difficile PCR negative, and CT the abdomen pelvis that shows collapse of the left lower lobe and cavitary lesion with consolidation of the right middle lobe concerning for cavitary pneumonia.   -On IV vancomycin, IV meropenem, and IV micafungin 6/1  -Continued on IV hydrocortisone 25 mg every 6 hours stress dose steroids  -Peritoneal fluid and bronchoscopy fluid growing Aspergillus fumigatus.  Although standard blood cultures negative presumably must be fungal make.  Discussed with ID and discussed risk factors/toxicity with family, stopped IV micafungin and started amphotericin B on 6/3.  -She had progressive hypotension and lactic acidosis maxed out on 3 vasopressors and pH fell below 7 while on CRRT.  Due to progressive hypotension CRRT could no longer be continued  and was discontinued.  Some comfort medications were provided.  Patient  in the afternoon.     Persistent high leukocytosis  CLL  Chronic anemia  Chronic thrombocytopenia  DIC  Fixed coagulopathy: She has required multiple transfusions of cryoprecipitate, FFP, PVCs, and platelets for likely DIC with ongoing low fibrinogen and anemia and thrombocytopenia from baseline.  Received PRBCs  and , 6/3; cryoprecipitate , , , , , , , 6/3; platelets , , , , , 6/3; FFP .  Also, has a persistent leukocytosis above 40 in the setting of CLL although admission WBC count was only 20.  She has been receiving stress dose hydrocortisone which may be contributing.  INR persistently elevated 2.5 in part secondary to fixed coagulopathy from liver disease and also DIC.  Baseline platelet count is around 30.  -Transfuse RBCs for hemoglobin less than 7   -Transfuse platelets for platelet count less than 20  -Transfuse cryoprecipitate for fibrinogen less than 100  -Oncology reevaluated patient and spoke with family .  Differential is mostly neutrophils.  This does not represent transition of CLL to ALL and the rising leukocytosis is more likely secondary to the infectious and inflammatory process.     Metabolic and septic encephalopathy  Small acute, subacute CVA: Depressed level of consciousness and ongoing confusion secondary to both septic and metabolic encephalopathy with both liver and renal failure.  Ammonia level not elevated.  Frequent liquid stools via rectal tube.  EEG did not show any seizure activity, just general encephalopathic slowing.  -General neurology was consulted.  Brain MRI was recommended for completeness which shows a small 3 mm acute to subacute CVA in the right superior frontal gyrus that is unlikely etiology for ongoing encephalopathy.   -Stroke neurology was consulted and recommended MRA head and neck with contrast nonurgently, but she was not  stable enough to do this  -Propofol had been changed to Versed due to hypotension     Primary biliary cirrhosis  History esophageal varices  Anasarca  Hypoalbuminemia: PBC diagnosed 10 years ago and follows with [facility] hepatology service.  Per her spouse she was being considered for a living donor transplant at the end of last year, but based on risk benefit and her current quite good quality of life they opted to avoid transplant and the risk that this carries especially with transplant medications.  She is chronically on ursodiol as well as nadolol 40 mg at bedtime, furosemide 20 mg every MWF, omeprazole 40 mg daily, and spironolactone 50 mg daily.    -LFTs continued to rise here in the setting of septic shock and renal failure which portends a poor prognosis, bilirubin up to 16.  She has hypoalbuminemia and elevated INR.  -Ursodiol was restarted      Lactic acidosis: Persistent lactic acidosis while here in part due to sepsis, renal failure, and liver failure.  Removing fluid with CRRT as able for diffuse anasarca.  On broad-spectrum antibiotics.     DANDRE on CKD stage II  Anasarca  History of nephrectomy  NAGMA: Previous nephrectomy for nonmalignant mass.  Baseline creatinine 1.1-1.2.  Admission creatinine 2.2 here and progressively worsening and then became an uric secondary to septic shock in the setting of liver failure.  Progressive anasarca secondary to sepsis, hypoalbuminemia, renal failure.  -Nephrology consulted  -Continued on CRRT while here.  Maxed out on 3 vasopressors and remained hypotensive with progressive severe lactic acidosis and pH less than 7.  CRRT no longer effective and was stopped by nephrology service after discussion with family.     Hypothyroidism: thyroid replacement was resumed.     Hypokalemia:  This was replaced per CRRT protocol.     Discharge Disposition:  Patient  during this admission     Allergies:  Allergies   Allergen Reactions     Contrast Dye Hives        Discharge  Medications:   Current Discharge Medication List      CONTINUE these medications which have NOT CHANGED    Details   Fish Oil-Cholecalciferol (OMEGA-3 FISH OIL/VITAMIN D3 PO) Take by mouth daily      furosemide (LASIX) 20 MG tablet Take 20 mg by mouth Every Mon, Wed, Fri Morning      nadolol (CORGARD) 20 MG tablet Take 40 mg by mouth At Bedtime      omeprazole (PRILOSEC) 40 MG DR capsule Take 1 capsule (40 mg) by mouth daily for 30 days  Qty: 30 capsule, Refills: 0      QC ZINC PO Take by mouth 2 times daily      spironolactone (ALDACTONE) 50 MG tablet Take 50 mg by mouth daily      thyroid (ARMOUR) 90 MG tablet Take 90 mg by mouth daily      ursodiol (ACTIGALL) 500 MG tablet Take 500 mg by mouth 2 times daily      VITAMIN A PO Take by mouth daily              Condition on Discharge:  Discharge condition:    Discharge vitals: Blood pressure 102/45, pulse (!) 0, temperature (!) 94.9  F (34.9  C), temperature source Temporal, resp. rate (!) 0, weight 77.6 kg (171 lb 1.2 oz), SpO2 (!) 89 %.   Code status on discharge: DNR     History of Illness:  See detailed admission note for full details.    Physical Exam:  Blood pressure 102/45, pulse (!) 0, temperature (!) 94.9  F (34.9  C), temperature source Temporal, resp. rate (!) 0, weight 77.6 kg (171 lb 1.2 oz), SpO2 (!) 89 %.  Wt Readings from Last 1 Encounters:   23 77.6 kg (171 lb 1.2 oz)     Exam from this morning before patient   Constitutional: Intubated  Eyes: sclera yellow  HEENT: ET tube and OG present  Respiratory: Intubated.  Tachypneic, posterior crackles, no wheeze  Cardiovascular: RRR, no murmur appreciated  GI: Moderately distended, few bowel sounds heard  Skin: Jaundice  Musculoskeletal/extremities: 3+ diffuse anasarca  Neurologic: Sedated on Versed    Procedures other than Imaging:  Intubation  Paracentesis x2  Bronchoscopy  Right IJ CVC placement  Left femoral arterial line placement  Left femoral hemodialysis catheter placement  Cuevas  catheter placement     Imaging:  Results for orders placed or performed during the hospital encounter of 05/22/23   US Paracentesis without Albumin    Narrative    US PARACENTESIS WITHOUT ALBUMIN 5/22/2023 2:30 PM    CLINICAL HISTORY: E.coli bacteremia, ascites    PROCEDURE: Informed consent obtained. Time out performed. The abdomen  was prepped and draped in a sterile fashion. 10 mL of 1% lidocaine was  infused into local soft tissues. A 5 Eston[place] catheter system was  introduced into the abdominal ascites under ultrasound guidance.    1.4 liters of clear fluid were removed and sent to lab if requested.    Patient tolerated procedure well.    Ultrasound imaging was obtained and placed in the patient's permanent  medical record.      Impression    IMPRESSION:  1.  Status post ultrasound-guided paracentesis.    HOWARD WORKMAN MD         SYSTEM ID:  [id number]   XR Chest Port 1 View    Narrative    XR PORTABLE CHEST ONE VIEW   5/22/2023 11:45 AM     HISTORY: Central line placement.    COMPARISON: None.      Impression    IMPRESSION: Right neck central venous line tip within the mid right  atrium. Consider retraction of this line by approximately 2.9 cm for  placement at the cavoatrial junction. Left base atelectasis.  Hypoinflated lungs. Normal cardiac silhouette.    KENDRICK EATON MD         SYSTEM ID:  [id number]   XR Chest Port 1 View    Narrative    EXAM: XR CHEST PORT 1 VIEW  LOCATION: [facility]  DATE/TIME: 5/22/2023 7:15 PM CDT    INDICATION: Endotracheal tube positioning.  COMPARISON: 5/22/2023 11:36 AM      Impression    IMPRESSION: Patient has been intubated with tube tip 3.8 cm above the evelio. Right IJ central venous catheter tip RA/SVC junction. Nasogastric tube enters the stomach with tip inferior to the radiographic field of view. Minimal bibasilar pleural fluid   and atelectasis. Heart size and pulmonary vascularity within normal limits.   XR Abdomen Port 1 View    Narrative    EXAM: XR  ABDOMEN PORT 1 VIEW  LOCATION: [facility]  DATE/TIME: 5/22/2023 7:16 PM CDT    INDICATION: Verify OG placement.  COMPARISON: 05/21/2023 CT AP.      Impression    IMPRESSION: Nasogastric tube tip in the mid stomach. Mild gaseous distention of the stomach. Bowel gas pattern is otherwise normal. Nothing for obstruction or free air. Surgical clips in the right upper quadrant. Minimal bibasilar pleural fluid and   atelectasis.   XR Chest Port 1 View    Narrative    CHEST ONE VIEW PORTABLE May 23, 2023 2:42 PM     HISTORY: Failed line placement.    COMPARISON: 5/22/2023.      Impression    IMPRESSION: Endotracheal tube, enteric tube, and right IJ central  venous catheter are unchanged. A small left pleural effusion with  associated opacity at the left lung base has increased since the  previous exam. Mild patchy opacity at the right lung base is  unchanged. No pneumothorax.    ELLEN GOLDBERG MD         SYSTEM ID:  [id number]   CT Head w/o Contrast    Narrative    CT HEAD W/O CONTRAST 5/26/2023 11:05 AM    INDICATION: off sedation, not waking. Acute pathology?  TECHNIQUE: CT scan of the head without contrast. Dose reduction  techniques were used.  CONTRAST: None.  COMPARISON: None.    FINDINGS:   No intracranial hemorrhage, extraaxial collection, mass effect or CT  evidence of acute infarct.  Mild presumed chronic small vessel  ischemic changes. Mild generalized volume loss. The ventricles are  proportional to the sulci. Benign-appearing thinning in the right  frontal and right parietal calvarium. Postoperative changes to the  bilateral lenses. Paranasal sinuses are free of significant disease.  Clear mastoid air cells.      Impression    IMPRESSION:  Mild age-related changes as above with no acute intracranial  abnormality.    STEPHANIE DALTON MD         SYSTEM ID:  [id number]   XR Chest Port 1 View    Narrative    CHEST ONE VIEW PORTABLE May 30, 2023 9:59 AM     HISTORY: Intubation.    COMPARISON:  5/23/2023.      Impression    IMPRESSION: Endotracheal tube, tip 4.2 cm above the evelio. An enteric  tube, tip not seen, but below the diaphragm. Right IJ central venous  catheter, tip near the SVC/RA junction. Bibasilar opacities, greater  on the left, have increased slightly since the previous exam. A small  left pleural effusion is also increased. No pneumothorax.    ELLEN GOLDBERG MD         SYSTEM ID:  [id number]   MR Brain w/o Contrast    Narrative    EXAM: MR BRAIN W/O CONTRAST  LOCATION: [facility]  DATE/TIME: 5/31/2023 5:51 PM CDT    INDICATION: Sepsis, DIC.  COMPARISON: None.  TECHNIQUE: Head MRI without IV contrast including diffusion weighted imaging, FLAIR and hemosiderin sensitive sequences.    FINDINGS:  INTRACRANIAL CONTENTS: There is a 3 mm focal area of diffusion restriction with corresponding T2 FLAIR signal abnormality in the superior frontal gyrus consistent with acute/early subacute microinfarct. No hemorrhage. No mass, acute hemorrhage, or   extra-axial fluid collections. Normal brain parenchymal signal. Normal ventricles and sulci. Normal position of the cerebellar tonsils.     OTHER: Accounting for technique no additional abnormalities identified.      Impression    IMPRESSION:  1.  There is a 3 mm acute/early subacute microinfarct measuring 3 mm in the right superior frontal gyrus.   XR Chest Port 1 View    Narrative    EXAM: XR CHEST PORT 1 VIEW  LOCATION: [facility]  DATE/TIME: 6/1/2023 5:00 AM CDT    INDICATION: increasing oxygen requiremnts  COMPARISON: 05/30/2023      Impression    IMPRESSION: Endotracheal tube tip about 4 cm above the evelio. Enteric tube terminates below the diaphragm. Right IJ central venous catheter tip at cavoatrial junction. Improved aeration at the left lung base, though with persistent infiltrate in the   right lower lung zone. No visible pneumothorax.   CT Abdomen Pelvis w/o Contrast    Narrative    CT ABDOMEN  PELVIS WITHOUT CONTRAST  6/1/2023 11:30 AM    HISTORY:  Worsening sepsis and increased WBC.    TECHNIQUE: CT scan obtained of the abdomen, and pelvis without IV  contrast. Radiation dose for this scan was reduced using automated  exposure control, adjustment of the mA and/or kV according to patient  size, or iterative reconstruction technique.    COMPARISON: CT abdomen and pelvis on 5/21/2023.    FINDINGS:    Lower chest: Small right pleural effusion and associated basilar  atelectasis/consolidation. Complete consolidation of the left lower  lobe, and patchy consolidative pulmonary opacities predominantly in  the right middle lingula with right middle lobe cavitation.    Abdomen/pelvis:Limited evaluation of the abdominal organs due to lack  of intravenous contrast, within this limitation, there is;    Hepatobiliary: Cirrhotic liver morphology. The gallbladder is  surgically absent.    Pancreas: The unenhanced pancreas is grossly unremarkable.    Spleen: The spleen is enlarged measuring 13.7 cm in craniocaudal  dimension.    Adrenal glands: No left adrenal nodule. The right adrenal nodule is  not visualized, likely surgically absent.    Kidneys: The right kidney is not visualized, likely surgically absent.  2 mm stone at the interpolar region of the left kidney. No left  ureteral stone or hydronephrosis.    Bowel: No abnormally dilated bowel loops. There is an enteric tube  with distal tip in the distal stomach. There is a rectal tube.    Peritoneum: Moderate amount of free fluid in the abdomen and pelvis,  not significantly changed as compared to 5/21/2023 exam. No free  peritoneal or portal venous gas.    Pelvic organs: Small focus of gas within the urinary bladder, could be  related to recent catheterization.    Vascular: There is a left femoral approach catheter with the tip in  the left external iliac artery. There is a left femoral approach  venous catheter with the tip in the distal aspect of the left  common  iliac artery. Enlarged venous collaterals in the upper abdomen.    Lymph nodes: Multiple mildly enlarged upper abdominal lymph nodes,  likely reactive.    Bones and soft tissue: No suspicious osseous lesion. Generalized  anasarca.      Impression    IMPRESSION:   1. There is complete collapse of the left lower lobe, new as compared  to 2023 exam. Bilateral patchy consolidative pulmonary opacities  including cavitary opacities in the right middle lobe, new as compared  to 2023 exam, likely infectious. Small right pleural effusion and  associated basilar atelectasis/consolidation  2. Cirrhotic liver morphology with evidence of portal hypertension  including splenomegaly, enlarged portosystemic collaterals and  moderate volume ascites.      LEEROY JARRELL MD         SYSTEM ID:  [id number]   Echocardiogram Complete     Value    LVEF  55-60%    Narrative    [id number]  [facility]ly [facility], [facility]–1983  [id number]  [id number]^[id number]^BRITT     [facility]  Echocardiography Laboratory  [address] [place] [address]     Name: PARTHA MEDEL  MRN: [id number]  : 1950  Study Date: 2023 08:14 AM  Age: 73 yrs  Gender: Female  Patient Location[facility]  Reason For Study: Shock  Ordering Physician: BRITT TURNER  Performed By: Joan Moses     BSA: 1.8 m2  Height: 61 in  Weight: 172 lb  HR: 65  BP: 86/49 mmHg  ______________________________________________________________________________  Procedure  Complete Portable Echo Adult. Optison (NDC #4239-4598) given intravenously.  ______________________________________________________________________________  Interpretation Summary     The visual ejection fraction is 55-60%.  Left ventricular systolic function is normal.  There is mild to moderate (1-2+) tricuspid regurgitation.  Borderline aortic stenosis.  There is trace aortic regurgitation.  The study was technically  difficult.  ______________________________________________________________________________  Left Ventricle  The left ventricle is normal in size. There is normal left ventricular wall  thickness. Diastolic Doppler findings (E/E' ratio and/or other parameters)  suggest left ventricular filling pressures are indeterminate. The visual  ejection fraction is 55-60%. Left ventricular systolic function is normal.     Right Ventricle  The right ventricle is normal in size and function.     Atria  Normal left atrial size. The right atrium is mildly dilated. There is no color  Doppler evidence of an atrial shunt.     Mitral Valve  There is mild (1+) mitral regurgitation.     Tricuspid Valve  There is mild to moderate (1-2+) tricuspid regurgitation. The right  ventricular systolic pressure is approximated at 36.7 mmHg plus the right  atrial pressure. Right ventricular systolic pressure is elevated, consistent  with mild to moderate pulmonary hypertension.     Aortic Valve  The aortic valve is not well visualized. There is trace aortic regurgitation.  The calculated aortic valve are is 2.0 cm^2. The peak AoV pressure gradient  is  20.0 mmHg. The mean AoV pressure gradient is 9.0 mmHg. Borderline aortic  stenosis.     Pulmonic Valve  There is no pulmonic valvular regurgitation.     Vessels  The aortic root is normal size. Unable to assess mean RA pressure given the  patient is on a ventilator.     Pericardium  There is no pericardial effusion. Epicardial fat pad noted.     Rhythm  Sinus rhythm was noted.  ______________________________________________________________________________  MMode/2D Measurements & Calculations     IVSd: 1.0 cm  LVIDd: 4.4 cm  LVIDs: 2.7 cm  LVPWd: 1.0 cm  IVC diam: 3.0 cm  FS: 38.6 %  LV mass(C)d: 150.9 grams  LV mass(C)dI: 85.2 grams/m2  Ao root diam: 2.7 cm  LVOT diam: 1.7 cm  LVOT area: 2.3 cm2  LA Volume (BP): 57.6 ml  LA Volume Index (BP): 32.5 ml/m2  RV Base: 4.3 cm  RWT: 0.46     TAPSE: 2.9 cm      Doppler Measurements & Calculations  MV E max jayson: 130.0 cm/sec  MV A max jayson: 102.0 cm/sec  MV E/A: 1.3  MV max P.1 mmHg  MV mean P.0 mmHg  MV V2 VTI: 42.9 cm  MVA(VTI): 2.3 cm2  MV dec time: 0.18 sec  Ao V2 max: 222.0 cm/sec  Ao max P.0 mmHg  Ao V2 mean: 134.0 cm/sec  Ao mean P.0 mmHg  Ao V2 VTI: 49.5 cm  MARTIN(I,D): 2.0 cm2  MARTIN(V,D): 1.9 cm2  LV V1 max P.3 mmHg  LV V1 max: 189.0 cm/sec  LV V1 VTI: 43.2 cm  SV(LVOT): 98.1 ml  SI(LVOT): 55.3 ml/m2  TR max jayson: 303.0 cm/sec  TR max P.7 mmHg  AV Jayson Ratio (DI): 0.85  MARTIN Index (cm2/m2): 1.1  E/E' av.4  Lateral E/e': 12.7  Medial E/e': 14.1  RV S Jayson: 15.4 cm/sec     ______________________________________________________________________________  Report approved by: Max Hamm 2023 11:33 AM                Consultations:  Consultation during this admission received from infectious disease, neurology, nephrology, oncology, stroke neurology, and palliative care.       Recent Lab Results:  Recent Labs   Lab 2336 23  1445   PH 6.99* 7.07* 7.35   PO2 65* 56* 82   PCO2 49* 44 26*   HCO3 12* 13* 14*     Recent Labs   Lab 23  1216   WBC 14.9* 15.3* 24.8*   HGB 8.8* 7.6* 7.7*   HCT 27.4* 23.0* 24.1*   * 102* 103*   PLT 36* 36* 15*     7-Day Micro Results     Collected Updated Procedure Result Status      2023 1310 2023 1439 Cell count with differential fluid [[id number]]    (Abnormal)   Ascites Fluid from Abdomen    Final result Component Value   No component results            2023 1310 2023 1524 Ascites Fluid Aerobic Bacterial Culture Routine [[id number]]   (Abnormal)   Ascites Fluid from Abdomen    Final result Component Value   Culture 1+ Aspergillus fumigatus                    2023 1310 2023 1856 Gram stain [[id number]]   Ascites Fluid from Abdomen    Final result Component Value   Gram Stain Result No  organisms seen   Gram Stain Result 3+ WBC seen   Predominantly PMNs            06/01/2023 1310 06/01/2023 1439 Cell Count Body Fluid [[id number]]    (Abnormal)   Ascites Fluid from Abdomen    Final result Component Value Units   Color Yellow    Clarity Cloudy    Cell Count Fluid Source Abdomen    ascites, pericentesis  ascites, pericentesis   Total Nucleated Cells 2,067 /uL            06/01/2023 1310 06/01/2023 1439 Differential Body Fluid [[id number]]    (Abnormal)   Ascites Fluid from Abdomen    Final result Component Value Units   % Neutrophils 85 %   % Lymphocytes 15 %   % Monocyte/Macrophages --    Absolute Neutrophils, Body Fluid 1,757.0 /uL            06/01/2023 1254 06/02/2023 0713 Cytology non gyn - BAL Sites [[id number]]   Bronchial Alveolar Lavage from Bronchus, Right    In process Component Value   No component results            06/01/2023 1254 06/01/2023 1444 Cell count with differential fluid - BAL Site 1 [[id number]]    (Abnormal)   Bronchial Alveolar Lavage from Bronchus, Right    Final result Component Value   No component results            06/01/2023 1254 06/01/2023 1915 Gram stain - BAL Site 1 [[id number]]   Bronchial Alveolar Lavage from Lung, Middle Lobe, Right    Final result Component Value   Gram Stain Result >25 PMNs/low power field   Gram Stain Result No organisms seen            06/01/2023 1254 06/01/2023 1307 Acid-Fast Bacilli Culture and Stain [[id number]]    Bronchial Alveolar Lavage from Bronchus, Right    In process Component Value   No component results            06/01/2023 1254 06/03/2023 1517 Fungus Culture, non-blood - BAL Site 1 [[id number]]   (Abnormal)   Bronchial Alveolar Lavage from Lung, Middle Lobe, Right    Preliminary result Component Value   Culture Aspergillus fumigatus  [P]                06/01/2023 1254 06/01/2023 1905 Koh prep - BAL Site 1 [[id number]]   Bronchial Alveolar Lavage from Lung, Middle Lobe, Right    Final result Component Value   KOH  Preparation No fungal elements seen   KOH Preparation Reference Range: No fungal elements seen.            06/01/2023 1254 06/03/2023 1529 Respiratory Aerobic Bacterial Culture with Gram Stain [[id number]]   (Abnormal)   Bronchial Alveolar Lavage from Bronchus, Right    Final result Component Value   Culture <10,000 CFU/mL Aspergillus fumigatus               06/01/2023 1254 06/02/2023 1132 Legionella culture - BAL Site 1 [[id number]]   Bronchial Alveolar Lavage from Lung, Middle Lobe, Right    Preliminary result Component Value   Culture Culture negative, monitoring continues  [P]                06/01/2023 1254 06/03/2023 0856 CMV Quantitative, PCR - BAL Site 1 [[id number]]    Bronchial Alveolar Lavage from Lung, Middle Lobe, Right    Final result Component Value Units   CMV DNA IU/mL Not Detected IU/mL            06/01/2023 1254 06/03/2023 1816 Nocardia culture - BAL Site 1 [[id number]]   Bronchial Alveolar Lavage from Lung, Middle Lobe, Right    Preliminary result Component Value   Culture No growth after 2 days  [P]                06/01/2023 1254 06/01/2023 1444 Cell Count Body Fluid [[id number]]    (Abnormal)   Bronchial Alveolar Lavage from Bronchus, Right    Final result Component Value Units   Color Orange    Clarity Cloudy    RBC Count 6,150 /uL   Cell Count Fluid Source Bronchus, Right    Total Nucleated Cells 8,450 /uL            06/01/2023 1254 06/02/2023 2201 Acid-Fast Bacilli Culture and Stain [[id number]]    Bronchial Alveolar Lavage from Bronchus, Right    Preliminary result Component Value   Acid Fast Stain No acid fast bacilli seen  [P]    Performed by [facility] [place] [place] [address] [phone].[employer], Demetrio Prado MD, PHD, Lab. Director   Acid Fast Stain No acid fast bacilli seen  [P]    Performed by [facility] [facility] [address] [phone].[employer], Demetrio Prado MD, PHD, Lab. Director  This is an appended report. These  results have been appended to a previously preliminary verified report.            06/01/2023 1254 06/01/2023 1444 Differential Body Fluid [[id number]]    Bronchial Alveolar Lavage from Bronchus, Right    Final result Component Value Units   % Neutrophils 85 %   % Lymphocytes 1 %   % Monocyte/Macrophages 14 %            06/01/2023 0931 06/01/2023 1530 C. difficile Toxin B PCR with reflex to C. difficile Antigen and Toxins A/B EIA [[id number]]    Stool from Per Rectum    Final result Component Value   C Difficile Toxin B by PCR Negative   A negative result does not exclude actual disease due to C. difficile and may be due to improper collection, handling and storage of the specimen or the number of organisms in the specimen is below the detection limit of the assay.            06/01/2023 0921 06/04/2023 1432 Blood Culture Line, arterial [[id number]]   Blood from Line, arterial    Preliminary result Component Value   Culture No growth after 3 days  [P]                06/01/2023 0921 06/04/2023 1432 Blood Culture Line, venous [[id number]]   Blood from Line, venous    Preliminary result Component Value   Culture No growth after 3 days  [P]                    Recent Labs   Lab 06/04/23  0814 06/04/23  0517 06/04/23  0412 06/03/23 2002 06/03/23 2001 06/03/23  1644 06/03/23  1446 06/03/23  1216 06/03/23  0801 06/03/23  0400 06/02/23  0749 06/02/23  0401 06/01/23  0745 06/01/23  0417   NA  --   --  139  --  137  --  138 139  --  138  138   < > 136  136   < > 137  137   POTASSIUM  --   --  4.0  --  3.6  --  3.4 3.4  --  3.8  3.8   < > 4.4  4.4   < > 4.7  4.7   CHLORIDE  --   --  103  --  102  --  101 103  --  100  100   < > 101  101   < > 103  103   CO2  --   --  13*  --  14*  --  13* 13*  --  13*  13*   < > 19*  19*   < > 18*  18*   ANIONGAP  --   --  23*  --  21*  --  24* 23*  --  25*  25*   < > 16*  16*   < > 16*  16*   GLC 96 169* 47*   < > 139*   < > 123* 125*  120*   < > 111*  109*  109*   <  > 177*  177*   < > 160*  177*  177*   BUN  --   --  14.6  --  17.1  --  19.0 19.4  --  24.5*  24.5*   < > 35.9*  35.9*   < > 35.4*  35.4*   CR  --   --  0.54  --  0.60  --  0.63 0.66  --  0.75  0.75   < > 0.92  0.92   < > 0.93  0.93   GFRESTIMATED  --   --  >90  --  >90  --  >90 >90  --  83  83   < > 65  65   < > 64  64   SONYA  --   --  9.5  --  9.5  --  9.3 9.3  --  8.9  8.9   < > 8.4*  8.4*   < > 8.2*  8.2*   MAG  --   --  2.4*  --  1.9  --   --  2.1  --  1.8   < > 2.2   < > 2.2   PHOS  --   --  4.7*  --  3.6  --  3.8 3.7  --  4.1   < > 4.8*   < > 4.2   PROTTOTAL  --   --   --   --   --   --   --   --   --  4.3*  --  5.1*  --  5.0*   ALBUMIN  --   --  3.5  --  3.1*  --  3.6 3.2*  --  3.4*  3.4*   < > 3.7  3.7   < > 3.4*  3.4*   BILITOTAL  --   --   --   --   --   --   --   --   --  13.7*  --  16.4*  --  16.4*   ALKPHOS  --   --   --   --   --   --   --   --   --  160*  --  179*  --  221*   AST  --   --   --   --   --   --   --   --   --  47*  --  43*  --  58*   ALT  --   --   --   --   --   --   --   --   --  34  --  38*  --  48*    < > = values in this interval not displayed.     Recent Labs   Lab 06/04/23  0412 06/03/23  0400 06/02/23  0401   INR 5.27* 6.18* 3.05*     Recent Labs   Lab 06/04/23  0412 06/03/23 2001 06/03/23  1445   LACT 13.6* 10.3* 11.4*     Fibrinogen 72       Pending Results:    Unresulted Labs Ordered in the Past 30 Days of this Admission     Date and Time Order Name Status Description    6/1/2023 12:31 PM Nocardia culture - BAL Site 1 Preliminary     6/1/2023 12:31 PM Legionella culture - BAL Site 1 Preliminary     6/1/2023 12:31 PM Fungus Culture, non-blood - BAL Site 1 Preliminary     6/1/2023 12:31 PM Acid-Fast Bacilli Culture and Stain In process     6/1/2023 12:31 PM Cytology non gyn - BAL Sites In process     6/1/2023  9:07 AM Blood Culture Line, venous Preliminary     6/1/2023  9:07 AM Blood Culture Line, arterial Preliminary          These results will be followed up  by patient's primary care provider.    Discharge Instructions and Follow-Up:   Discharge Procedure Orders   Follow Up ([facility])   Order Comments: Follow up with me  [facility], within 1-2 weeks of discharge. for hospital follow- up.  The following labs/tests are recommended: BMP.    [facility]  [address], [place]            I, Troy Diggs MD, personally saw the patient today and spent greater than 30 minutes discharging this patient.    Troy Diggs MD

## 2023-06-04 NOTE — PLAN OF CARE
ICU End of Shift Summary.  For vital signs and complete assessments, please see documentation flowsheets.      Pertinent assessments: Patient intubated. Initially tolerating CRRT and fluid removal. Around 0130, patient began to desaturate, despite increase in FIO2, suctioning, and repositioning. Orders obtained from Tele-[facility] to increase PEEP and initiate Veletri. Orders also obtained for IVP paralytic x1. Sedation restarted prior to paralytic administration. After desaturation began, MAPs also began to drop. Pressors increased - see MAR for details. Fluid removal stopped on CRRT. Tele-hub MD notified patient's family of situation and patient's overall decline. RN also discussed patient status with family and concern that the patient is deteriorating. Family at bedside prior to end of shift.   Major Shift Events: Increase in pressors, significantly worse overall. MD and family aware.   Plan (Upcoming Events): Continue current plan of care.   Discharge/Transfer Needs: TBD     Bedside Shift Report Completed : Y  Bedside Safety Check Completed: Y    CRRT STATUS NOTE  Pressures WNL:  YES  Filter Status:  WDL    Problems Reported/Alarms Noted: TMP elevated at beginning of shift - filter changed due to clogging.      ASSESSMENT:  Patient Net Fluid Balance: Net positive due to hypotension & fluid removal intolerance.     Vital Signs: Temp:  [94.7  F (34.8  C)-96  F (35.6  C)] 96  F (35.6  C)  Pulse:  [] 126  Resp:  [14-32] 15  MAP:  [50 mmHg-86 mmHg] 56 mmHg  Arterial Line BP: ()/(32-90) 123/37  FiO2 (%):  [45 %-100 %] 100 %  SpO2:  [75 %-100 %] 91 %     Temperature managed with   Blood warmer YES  Forced warm air YES  Blankets YES    Goals of Therapy: Net even.      INTERVENTIONS/Significant Events: Mag replaced x1. Lactic elevated.     PLAN/Meeting Goal:  Continue with CRRT per plan of care. Fluid removal per plan of care. Due for filter change 6/6/23 at 2100 or sooner if indicated.

## 2023-06-04 NOTE — PROGRESS NOTES
Respiratory Therapy Note     On license of [facility] ICU VENTILATOR RESPIRATORY NOTE  Date of Admission: 05/22/23  Date of Intubation (most recent): 05/22/23  Reason for Mechanical Ventilation: Respiratory Failure  Number of Days on Mechanical Ventilation: 14  Met Criteria for Pressure Support Trial: No  Reason for No Pressure Support Trial: Peep +14, 100%.       Started Veletri.      Plan:  Continue to monitor patient's respiratory status.     Vent Mode: CMV/AC  (Continuous Mandatory Ventilation/ Assist Control)  FiO2 (%): 100 %  Resp Rate (Set): 15 breaths/min  Tidal Volume (Set, mL): 380 mL  PEEP (cm H2O): 14 cmH2O  Resp: 25     Suctioning small/moderate, red, thin secretions from ETT. RT to follow.     Tabby Garcia, RT

## 2023-06-04 NOTE — DEATH PRONOUNCEMENT
MD DEATH PRONOUNCEMENT    Called to pronounce Teresa De Santiago dead.    Physical Exam: Unresponsive to noxious stimuli, Spontaneous respirations absent, Breath sounds absent, Carotid pulse absent, Heart sounds absent and Pupillary light reflex absent    Patient was pronounced dead at 15:49 PM, 2023.    Preliminary Cause of Death: septic shock, disseminated Aspergillus infection    Principal Problem:    Hypoglycemia  Active Problems:    Thrombocytopenia (H)    Gram-negative bacteremia    Septic shock (H)    Hypotension, unspecified hypotension type       Infectious disease present?: YES    Communicable disease present? (examples: HIV, chicken pox, TB, Ebola, CJD) :  NO    Multi-drug resistant organism present? (example: MRSA): NO    Please consider an autopsy if any of the following exist:  NO Unexpected or unexplained death during or following any dental, medical, or surgical diagnostic treatment procedures.   NO Death of mother at or up to seven days after delivery.     NO All  and pediatric deaths.     NO Death where the cause is sufficiently obscure to delay completion of the death certificate.   NO Deaths in which autopsy would confirm a suspected illness/condition that would affect surviving family members or recipients of transplanted organs.     The following deaths must be reported to the 's Office:  NO A death that may be due entirely or in part to any factors other than natural disease (recent surgery, recent trauma, suspected abuse/neglect).   NO A death that may be an accident, suicide, or homicide.     NO Any sudden, unexpected death in which there is no prior history of significant heart disease or any other condition associated with sudden death.   NO A death under suspicious, unusual, or unexpected circumstances.    NO Any death which is apparently due to natural causes but in which the  does not have a personal physician familiar with the patient s medical history,  social, or environmental situation or the circumstances of the terminal event.   NO Any death apparently due to Sudden Infant Death Syndrome.     NO Deaths that occur during, in association with, or as consequences of a diagnostic, therapeutic, or anesthetic procedure.   NO Any death in which a fracture of a major bone has occurred within the past (6) six months.   NO A death of persons note seen by their physician within 120 days of demise.     NO Any death in which the  was an inmate of a public institution or was in the custody of Law Enforcement personnel.   NO  All unexpected deaths of children   Unknown Solid organ donors   NO Unidentified bodies   NO Deaths of persons whose bodies are to be cremated or otherwise disposed of so that the bodies will later be unavailable for examination;   NO Deaths unattended by a physician outside of a licensed healthcare facility or licensed residential hospice program   NO Deaths occurring within 24 hours of arrival to a health care facility if death is unexpected.    NO Deaths associated with the decedent s employment.   NO Deaths attributed to acts of terrorism.   NO Any death in which there is uncertainty as to whether it is a medical examiner s care should be discussed with the medical investigator.        Body disposition: Family is currently discussing among themselves if they are wishing for an autopsy to be done or not.  They will let us know.

## 2023-06-04 NOTE — PROGRESS NOTES
Renal Medicine Progress Note    SHORTHAND KEY FOR MY NOTES:  c = with, s = without, p = after, a = before, x = except, asx = asymptomatic, tx = transplant or treatment, sx = symptoms or symptomatic, cx = canceled or culture, rxn = reaction, yday = yesterday, nl = normal, abx = antibiotics, fxn = function, dx = diagnosis, dz = disease, m/h = melena/hematochezia, c/d/l/ha = cramping/dizziness/lightheadedness/headache, d/c = discharge or diarrhea/constipation, f/c/n/v = fevers/chills/nausea/vomiting, cp/sob = chest pain/shortness of breath, tbv = total body volume, rxn = reaction, tdc = tunneled dialysis catheter, pta = prior to admission, hd = hemodialysis, pd = peritoneal dialysis, hhd = home hemodialysis, edw = estimated dry wt         Assessment/Plan:     1.  Oliguric DANDRE.  Pt's BP is currently quite low (30s/10s).  We will stop CRRT and rinse her back.  Hopefully, this will give her enough additional BP to allow other family members to arrive.  Explained this to family (, son).  A.  Stop CRRT.  B.  Pt will move to comfort [facility] soon.    Case d/w Caterina Persaud.    Thank you for this consultation.  I will sign off.  Please call if any questions.        Interval History:     Unable.  Pt is intubated/sedated.          Medications and Allergies:       midazolam         sodium chloride 0.9%  500 mL Intravenous Q24H     acetaminophen  650 mg Oral Q24H     dextrose 5% water  10-20 mL Intravenous Q24H    And     amphotericin B liposome  5 mg/kg (Dosing Weight) Intravenous Q24H    And     dextrose 5% water  10-20 mL Intravenous Q24H     B and C vitamin Complex with folic acid  5 mL Per Feeding Tube Daily     diphenhydrAMINE  25 mg Oral Q24H    Or     diphenhydrAMINE  25 mg Intravenous Q24H     fentaNYL  100 mcg Intravenous Once     hydrocortisone sodium succinate PF  50 mg Intravenous Q6H     insulin aspart  1-4 Units Subcutaneous Q4H     meropenem  1 g Intravenous Q8H     pantoprazole  40 mg Intravenous  Daily with breakfast     thyroid  90 mg Oral or Feeding Tube Daily     ursodiol  500 mg Oral BID      Allergies   Allergen Reactions     Contrast Dye Hives          Physical Exam:     Vitals were reviewed   , Blood pressure 102/45, pulse 89, temperature (!) 94.9  F (34.9  C), temperature source Temporal, resp. rate 15, weight 77.6 kg (171 lb 1.2 oz), SpO2 (!) 80 %.  Wt Readings from Last 3 Encounters:   06/03/23 77.6 kg (171 lb 1.2 oz)   05/21/23 72.1 kg (159 lb)   05/16/23 68 kg (150 lb)     Intake/Output Summary (Last 24 hours) at 6/4/2023 1044  Last data filed at 6/4/2023 1010  Gross per 24 hour   Intake 4318.52 ml   Output 2325 ml   Net 1993.52 ml     GENERAL APPEARANCE: intubated, sedated  HEENT:  eyes/ears/nose/neck grossly nl x + OG  RESP: + coarse rhonchi, vent sounds  CV: irreg  ABDOMEN: + distended  EXTREMITIES/SKIN: + jaundice, + anasarca  NEURO:  sedated  LINES:  + RIJ 2-lumen, + L fem temp HD catheter, + rectal tube, + fem art line    Pt seen on CRRT. BP is very low.         Data:     CBC RESULTS:     Recent Labs   Lab 06/04/23  0412 06/03/23 2001 06/03/23  1216 06/03/23  0400 06/02/23  1958 06/02/23  1221   WBC 14.9* 15.3* 24.8* 36.5* 69.2* 65.9*   RBC 2.62* 2.26* 2.34* 2.07* 2.26* 2.20*   HGB 8.8* 7.6* 7.7* 6.9* 7.6* 7.4*   HCT 27.4* 23.0* 24.1* 22.2* 23.3* 22.4*   PLT 36* 36* 15* 28* 58* 60*     Basic Metabolic Panel:  Recent Labs   Lab 06/04/23  0814 06/04/23  0517 06/04/23 0412 06/04/23  0004 06/03/23 2002 06/03/23 2001 06/03/23  1644 06/03/23  1446 06/03/23  1216 06/03/23  0801 06/03/23  0400 06/02/23 2000 06/02/23  1958   NA  --   --  139  --   --  137  --  138 139  --  138  138  --  137   POTASSIUM  --   --  4.0  --   --  3.6  --  3.4 3.4  --  3.8  3.8  --  4.4   CHLORIDE  --   --  103  --   --  102  --  101 103  --  100  100  --  102   CO2  --   --  13*  --   --  14*  --  13* 13*  --  13*  13*  --  17*   BUN  --   --  14.6  --   --  17.1  --  19.0 19.4  --  24.5*  24.5*  --  31.7*    CR  --   --  0.54  --   --  0.60  --  0.63 0.66  --  0.75  0.75  --  0.87   GLC 96 169* 47* 88 139* 139*   < > 123* 125*  120*   < > 111*  109*  109*   < > 159*   SONYA  --   --  9.5  --   --  9.5  --  9.3 9.3  --  8.9  8.9  --  9.0    < > = values in this interval not displayed.     INR  Recent Labs   Lab 06/04/23  0412 06/03/23  0400 06/02/23  0401 06/01/23  0417   INR 5.27* 6.18* 3.05* 2.97*      Attestation:   I have reviewed today's relevant vital signs, notes, medications, labs and imaging.    Phillip Recinos MD  [facility]  [phone]

## 2023-06-04 NOTE — PROGRESS NOTES
Appears to have worsening hypoxemia while on current vent settings.  Significant pneumonia with large consolidation- shunt physiology,? ARDS.    Start full dose Veletri, try one time dose paralytic- vecuronium  Increase PEEP to 14 and reduce TV- 380 ml  Recheck blood gases.    Wilfred Navarrete MD

## 2023-06-04 NOTE — PROGRESS NOTES
ICU Attending Note    I have seen and examined Teresa De Santiago, reviewed the patient's history, pertinent labs, vital signs, medications, physical exam, and radiographs.  The patient is critically ill by my examination and requires continued ICU monitoring and cares    Teresa De Santiago is admitted to ICU with septic shock due to e coli bacteremia and spontaneous bacterial peritonitis.  Has primary biliary cirrhosis. Yesterday, bronchial and peritoneal cultures demonstrated aspergillis    Recent Events:  Difficulty with gas exchange overnight, worsening shock, pressors doses way up. One amp of bicarb administered with transient improvement. I talked with  and their son and daughter.     Exam:  Temp:  [94.9  F (34.9  C)-96  F (35.6  C)] 94.9  F (34.9  C)  Pulse:  [] 117  Resp:  [] 15  MAP:  [38 mmHg-86 mmHg] 47 mmHg  Arterial Line BP: ()/(23-90) 102/36  FiO2 (%):  [65 %-100 %] 100 %  SpO2:  [58 %-100 %] 71 %  @I/O last 3 completed shifts:  In: 5397.49 [I.V.:2068.49; Other:283; NG/GT:305; IV Piggyback:1451]  Out: 2330 [Other:1255; Stool:1075]  Vent Mode: CMV/AC  (Continuous Mandatory Ventilation/ Assist Control)  FiO2 (%): 100 %  Resp Rate (Set): 15 breaths/min  Tidal Volume (Set, mL): 380 mL  PEEP (cm H2O): 14 cmH2O  Resp: 15    unresponsive  Lungs coarse  Heart rrr  abd soft  Ext warm, pitting edema, jaundiced skin      Results:  ABG   Recent Labs   Lab 06/04/23  0536 06/04/23  0412 06/03/23  1445 06/03/23  1216   PH 6.99* 7.07* 7.35 7.34*   PCO2 49* 44 26* 24*   PO2 65* 56* 82 71*   HCO3 12* 13* 14* 13*     CBC  Recent Labs   Lab 06/04/23  0412 06/03/23  2001 06/03/23  1216 06/03/23  0400   WBC 14.9* 15.3* 24.8* 36.5*   HGB 8.8* 7.6* 7.7* 6.9*   HCT 27.4* 23.0* 24.1* 22.2*   PLT 36* 36* 15* 28*     BMP  Recent Labs   Lab 06/04/23  0814 06/04/23  0517 06/04/23  0412 06/04/23  0004 06/03/23 2002 06/03/23 2001 06/03/23  1644 06/03/23  1446 06/03/23  1216   NA  --   --  139  --   --  137  --   138 139   POTASSIUM  --   --  4.0  --   --  3.6  --  3.4 3.4   CHLORIDE  --   --  103  --   --  102  --  101 103   CO2  --   --  13*  --   --  14*  --  13* 13*   BUN  --   --  14.6  --   --  17.1  --  19.0 19.4   CR  --   --  0.54  --   --  0.60  --  0.63 0.66   GLC 96 169* 47* 88   < > 139*   < > 123* 125*  120*    < > = values in this interval not displayed.     LFT  Recent Labs   Lab 06/04/23 0412 06/03/23 2001 06/03/23  1446 06/03/23  1216 06/03/23  0400 06/02/23  1221 06/02/23  0401 06/01/23  1233 06/01/23  0417 05/31/23  1314 05/31/23  0515   AST  --   --   --   --  47*  --  43*  --  58*  --  68*   ALT  --   --   --   --  34  --  38*  --  48*  --  53*   ALKPHOS  --   --   --   --  160*  --  179*  --  221*  --  194*   BILITOTAL  --   --   --   --  13.7*  --  16.4*  --  16.4*  --  13.9*   ALBUMIN 3.5 3.1* 3.6 3.2* 3.4*  3.4*   < > 3.7  3.7   < > 3.4*  3.4*   < > 2.8*  2.8*   INR 5.27*  --   --   --  6.18*  --  3.05*  --  2.97*  --  2.53*    < > = values in this interval not displayed.     PancreasNo lab results found in last 7 days.  INR  Lab Results   Component Value Date    INR 3.05 06/02/2023    INR 2.97 06/01/2023    INR 2.53 05/31/2023    INR 2.51 05/30/2023    INR 1.21 12/12/2016    INR 1.14 06/20/2014       Current Issues:    Neuro  No sedation 6/3 and unresponsive but medications administered today in the setting of imminent death and concern for undetectable suffering.       Respiratory  o Aspergillus pneumonia with disseminated disease  o High minute volume and respiratory alkalosis.  This may be secondary to liver failure.      Cardiovascular  o Septic shock in the setting of cirrhosis: vasopressors as needed to maintain MAP.  This portends a poor prognosis.      GI  o Decompensated cirrhosis: supportive care  o Protein calorie malnutrition: tube feeds stopped 6/3 due to shock and poor enteric perfusion      Renal  o Acute kidney injury: On CRRT, stopped today by Dr Lima for refractory  hypotension      Endo  o Hyperglycemia  o Insulin dependent diabetes      Heme  o Anemia: follow for need to transfuse such as insufficient oxygen delivery  o Thrombocytopenia, no evidence of bleeding at current level  o CLL with reactive leukocytosis      Infectious Disease  o  SBP with bacteremia: on antibiotics  o Disseminated aspergillosis - presumed due to concomitant illness and steroids - it appears to have rapidly developed during hospitalization from no visible lung involvement on admission to cavitary lung lesion on 6/1 - bronchoscopy lavage was positive on 6/3      This is an overall picture of multisystem organ failure involving renal, liver, and pulmonary with other organ system dysfunction (neuro: MRI of stroke, hem: history of CLL). Mortality in this setting is imminent. Her family is accepting of this, has requested medications to ensure she is not suffering (versed and fentanyl)    Evaluation and management time exclusive of procedures was 60 minutes critical care time including:  examination with the ICU team, discussion of the patient's condition with other physicians and members of the care team, reviewing all data related to the patient, and time utilizing the EMR for documentation of this patient's care.      Mar Roca MD  Acute Care Surgery/Critical Care

## 2023-06-05 LAB
ATRIAL RATE - MUSE: 122 BPM
BACTERIA BRONCH: ABNORMAL
DIASTOLIC BLOOD PRESSURE - MUSE: NORMAL MMHG
INTERPRETATION ECG - MUSE: NORMAL
P AXIS - MUSE: NORMAL DEGREES
PATH REPORT.COMMENTS IMP SPEC: NORMAL
PATH REPORT.FINAL DX SPEC: NORMAL
PATH REPORT.GROSS SPEC: NORMAL
PATH REPORT.MICROSCOPIC SPEC OTHER STN: NORMAL
PATH REPORT.RELEVANT HX SPEC: NORMAL
PR INTERVAL - MUSE: NORMAL MS
QRS DURATION - MUSE: 96 MS
QT - MUSE: 304 MS
QTC - MUSE: 477 MS
R AXIS - MUSE: -24 DEGREES
SYSTOLIC BLOOD PRESSURE - MUSE: NORMAL MMHG
T AXIS - MUSE: 175 DEGREES
VENTRICULAR RATE- MUSE: 148 BPM

## 2023-06-05 ASSESSMENT — ACTIVITIES OF DAILY LIVING (ADL)
ADLS_ACUITY_SCORE: 30

## 2023-06-06 LAB
APPEARANCE FLD: ABNORMAL
BACTERIA BLD CULT: NO GROWTH
BACTERIA BLD CULT: NO GROWTH
CELL COUNT BODY FLUID SOURCE: ABNORMAL
COLOR FLD: YELLOW
WBC # FLD AUTO: 2067 /UL

## 2023-06-14 LAB — BACTERIA BRONCH: NORMAL

## 2023-06-29 LAB — BACTERIA BRONCH: NO GROWTH

## 2023-07-27 LAB
ACID FAST STAIN (ARUP): NORMAL

## 2023-09-29 NOTE — PROGRESS NOTES
Renal Medicine Progress Note    SHORTHAND KEY FOR MY NOTES:  c = with, s = without, p = after, a = before, x = except, asx = asymptomatic, tx = transplant or treatment, sx = symptoms or symptomatic, cx = canceled or culture, rxn = reaction, yday = yesterday, nl = normal, abx = antibiotics, fxn = function, dx = diagnosis, dz = disease, m/h = melena/hematochezia, c/d/l/ha = cramping/dizziness/lightheadedness/headache, d/c = discharge or diarrhea/constipation, f/c/n/v = fevers/chills/nausea/vomiting, cp/sob = chest pain/shortness of breath, tbv = total body volume, rxn = reaction, tdc = tunneled dialysis catheter, pta = prior to admission, hd = hemodialysis, pd = peritoneal dialysis, hhd = home hemodialysis, edw = estimated dry wt         Assessment/Plan:     1.  Oliguric DANDRE.  Pt remains on CRRT.  She is UF'ing ~100-150 ml/h and pressor needs have decreased today.  She remains TBV up from third-spacing, but most of that is extravascular.  Chemistries are ok.  She just clotted the dialyser p 2d and blood was able to be returned.  A.  Continue CRRT while on pressors.  B.  Follow labs, clinically.    2.  Resp failure.  Pt remains on the vent.  A.  Per ICU team.    3.  E. Coli sepsis syndrome.  She is on pressors and broad abx.  WBC remains quite elevated.  She is still sick.  A.  Wean pressors as able.  B.  Continue abx at proper dose for CRRT.  C.  Follow labs, clinically.    4.  Anemia.  Hb remains low, but stable.  A.  Follow labs.    5.  Cirrhosis 2 PBC.  Pt's bili is rising acutely and INR is high.    A.  Monitor LFTs, clinically.    6.  FEN.  Electrolytes are ok x low Ca.  She is getting replacement.   A.  Check ICa daily and replace prn.        Interval History:     Unable.  Pt is intubated/sedated.          Medications and Allergies:       B and C vitamin Complex with folic acid  5 mL Per Feeding Tube Daily     hydrocortisone sodium succinate PF  50 mg Intravenous Q6H     insulin aspart  1-4 Units Subcutaneous  Q4H     pantoprazole  40 mg Per Feeding Tube QAM AC     piperacillin-tazobactam  4.5 g Intravenous Q6H     protein modular  1 packet Per Feeding Tube TID     sodium chloride (PF)  3 mL Intracatheter Q8H     thyroid  90 mg Oral or Feeding Tube Daily     ursodiol  500 mg Oral BID      Allergies   Allergen Reactions     Contrast Dye Hives          Physical Exam:     Vitals were reviewed   , Blood pressure 129/60, pulse 80, temperature 98.3  F (36.8  C), temperature source Axillary, resp. rate 21, weight 79.5 kg (175 lb 4.3 oz), SpO2 91 %.  Wt Readings from Last 3 Encounters:   05/30/23 79.5 kg (175 lb 4.3 oz)   05/21/23 72.1 kg (159 lb)   05/16/23 68 kg (150 lb)     Intake/Output Summary (Last 24 hours) at 5/30/2023 1621  Last data filed at 5/30/2023 1600  Gross per 24 hour   Intake 2999.82 ml   Output 4236 ml   Net -1236.18 ml     GENERAL APPEARANCE: intubated, sedated  HEENT:  eyes/ears/nose/neck grossly nl x + NJ  RESP: + vent sounds o/w CTA B  CV: RRR; nl S1/S2   ABDOMEN: s/nt/nd  EXTREMITIES/SKIN: + jaundice, + anasarca  NEURO:  sedated  LINES:  + guerra, + RIJ 2-lumen, + L fem temp HD catheter, + rectal tube, + fem art line         Data:     CBC RESULTS:     Recent Labs   Lab 05/30/23  1210 05/30/23  0404 05/29/23 1956 05/29/23  1239 05/29/23  0401 05/28/23 1958   WBC 36.4* 31.9* 35.3* 33.3* 38.9* 34.2*   RBC 2.36* 2.42* 2.48* 2.53* 2.65* 2.54*   HGB 7.6* 7.7* 7.9* 7.9* 8.3* 8.1*   HCT 23.3* 23.5* 24.6* 24.4* 26.0* 24.5*   PLT 39* 20* 25* 29* 37* 39*     Basic Metabolic Panel:  Recent Labs   Lab 05/30/23  1602 05/30/23  1212 05/30/23  1210 05/30/23  0835 05/30/23  0404 05/30/23  0402 05/30/23  0017 05/29/23 1956 05/29/23  1538 05/29/23  1239 05/29/23  0802 05/29/23  0401 05/28/23  2356 05/28/23 1958   NA  --   --  138  --  138  138  --   --  138  --  138  --  138  --  137   POTASSIUM  --   --  4.0  --  4.1  4.1  --   --  4.1  --  4.2  --  4.5  --  4.4   CHLORIDE  --   --  103  --  104  104  --   --  105  --   105  --  103  --  102   CO2  --   --  21*  --  21*  21*  --   --  21*  --  22  --  21*  --  21*   BUN  --   --  27.1*  --  24.7*  24.7*  --   --  23.3*  --  22.1  --  21.8  --  21.0   CR  --   --  0.95  --  0.90  0.90  --   --  0.88  --  0.88  --  0.89  --  0.93   * 162* 181* 125* 188*  188* 173*   < > 176*   < > 169*   < > 173*   < > 162*   SONYA  --   --  8.1*  --  7.7*  7.7*  --   --  7.9*  --  7.6*  --  7.6*  --  7.9*    < > = values in this interval not displayed.     INR  Recent Labs   Lab 05/30/23  0404 05/29/23  0602 05/28/23  2159 05/28/23  1422   INR 2.51* 2.48* 2.23* 2.02*      Attestation:   I have reviewed today's relevant vital signs, notes, medications, labs and imaging.    Phillip Recinos MD  [facility] - Nephrology  [phone]   .

## 2024-03-07 NOTE — PROGRESS NOTES
PALLIATIVE CARE PROGRESS NOTE  [facility]     Patient Name: Teresa De Santiago  Date of Admission: 5/22/2023   Today the patient was seen for:  Goals of care follow-up, family support     Recommendations & Counseling     GOALS OF CARE:     Life-prolonging without limits      ADVANCE CARE PLANNING:    No health care directive on file. Per  informed consent policy next of kin should be involved if patient becomes unable.    There is no POLST form on file, plan to complete prior to DC.    Code status: Full Code     MEDICAL MANAGEMENT:    #Pain,acute     Opioids: Fentanyl drip for sedation that will also cover any abdominal pain she might be having    Acetaminophen (Tylenol), PRN     #Dyspnea    Full vent support per medical/ICU team      #General Weakness/Debility     Appreciate the staff for total cares    Appreciate the input of therapies for discharge recommendations when medically indicated     PSYCHOSOCIAL/SPIRITUAL:    Family-Spouse Yomi, 3 adult Children that live in the area    Patient did not work outside of the home, spouse is an     [place] [facility]: [facility]     Palliative Care will continue to follow. Thank you for the consult and allowing us to aid in the care of Teresa De Santiago.     These recommendations have been discussed with medical team and family    Yonas Piper NP  Securely message with [employer] (more info)  Text page via [facility] Paging/Directory           Assessments          Teresa De Santiago is a 73 year old female with a past medical history of primary biliary cirrhosis that was diagnosed 10 years ago and appears to be end stage. She has anasarca, esophageal varices, chronic anemia, chronic thrombocytopenia related to her cirrhosis, she has a history of a right nephrectomy for nonmalignant mass, and recent diagnosis of CLL, who presented on 5/22 with positive blood cultures for Ecoli after  being seen in the ED.    Prognosis, Goals, or Advance Care Planning was addressed today with: Yes.  Mood, coping, and/or meaning in the context of serious illness were addressed today: Yes.              Interval History:     Chart review/discussion with unit or clinical team members:   Course reviewed. No significant events overnight and no new medical concerns today.  Now off vasopressin as of this afternoon.  Continues with norepinephrine.  Minimal nonpurposeful movements noted.  Remains on CRRT    Per patient or family/caregivers today:  Called Yomi today.  He did not answer I did leave a voicemail.           Review of Systems:     Unable to assess               Physical Exam:   Temp: 97.5  F (36.4  C) Temp src: Axillary Temp  Min: 96.9  F (36.1  C)  Max: 97.9  F (36.6  C) BP: 129/60 Pulse: 98   Resp: 21 SpO2: 93 % O2 Device: Mechanical Ventilator    Vital Signs with Ranges  Temp:  [96.9  F (36.1  C)-97.9  F (36.6  C)] 97.5  F (36.4  C)  Pulse:  [57-99] 98  Resp:  [11-29] 21  BP: (119-129)/(55-60) 129/60  MAP:  [57 mmHg-107 mmHg] 72 mmHg  Arterial Line BP: ()/(18-99) 107/52  FiO2 (%):  [40 %] 40 %  SpO2:  [88 %-100 %] 93 %  175 lbs 4.25 oz    Physical Exam  Vitals and nursing note reviewed.   Constitutional:       Interventions: She is sedated and intubated.   HENT:      Mouth/Throat:      Mouth: Mucous membranes are moist.   Eyes:      Conjunctiva/sclera: Conjunctivae normal.      Pupils: Pupils are equal, round, and reactive to light.   Cardiovascular:      Rate and Rhythm: Normal rate and regular rhythm.      Pulses: Normal pulses.   Pulmonary:      Effort: She is intubated.      Breath sounds: Decreased breath sounds present. No wheezing.   Abdominal:      Tenderness: There is no abdominal tenderness.   Musculoskeletal:         General: No tenderness or deformity.   Skin:     General: Skin is warm and dry.      Capillary Refill: Capillary refill takes less than 2 seconds.   Neurological:      General:  No focal deficit present.                  Current Problem List:   Principal Problem:    Hypoglycemia  Active Problems:    Thrombocytopenia (H)    Gram-negative bacteremia    Septic shock (H)    Hypotension, unspecified hypotension type      Allergies   Allergen Reactions     Contrast Dye Hives            Data Reviewed:       Data   Results for orders placed or performed during the hospital encounter of 05/22/23 (from the past 24 hour(s))   Glucose by meter   Result Value Ref Range    GLUCOSE BY METER POCT 173 (H) 70 - 99 mg/dL   Glucose by meter   Result Value Ref Range    GLUCOSE BY METER POCT 161 (H) 70 - 99 mg/dL   Renal panel CRRT   Result Value Ref Range    Sodium 138 136 - 145 mmol/L    Potassium 4.1 3.4 - 5.3 mmol/L    Chloride 105 98 - 107 mmol/L    Carbon Dioxide (CO2) 21 (L) 22 - 29 mmol/L    Anion Gap 12 7 - 15 mmol/L    Glucose 176 (H) 70 - 99 mg/dL    Urea Nitrogen 23.3 (H) 8.0 - 23.0 mg/dL    Creatinine 0.88 0.51 - 0.95 mg/dL    GFR Estimate 68 >60 mL/min/1.73m2    Calcium 7.9 (L) 8.8 - 10.2 mg/dL    Albumin 2.8 (L) 3.5 - 5.2 g/dL    Phosphorus 4.3 2.5 - 4.5 mg/dL   Magnesium CRRT   Result Value Ref Range    Magnesium 2.2 1.7 - 2.3 mg/dL   CBC with platelets CRRT   Result Value Ref Range    WBC Count 35.3 (H) 4.0 - 11.0 10e3/uL    RBC Count 2.48 (L) 3.80 - 5.20 10e6/uL    Hemoglobin 7.9 (L) 11.7 - 15.7 g/dL    Hematocrit 24.6 (L) 35.0 - 47.0 %    MCV 99 78 - 100 fL    MCH 31.9 26.5 - 33.0 pg    MCHC 32.1 31.5 - 36.5 g/dL    RDW 19.5 (H) 10.0 - 15.0 %    Platelet Count 25 (LL) 150 - 450 10e3/uL   Calcium Ionized CRRT   Result Value Ref Range    Calcium Ionized 4.4 4.4 - 5.2 mg/dL   Glucose by meter   Result Value Ref Range    GLUCOSE BY METER POCT 161 (H) 70 - 99 mg/dL   Glucose by meter   Result Value Ref Range    GLUCOSE BY METER POCT 173 (H) 70 - 99 mg/dL   Renal panel CRRT   Result Value Ref Range    Sodium 138 136 - 145 mmol/L    Potassium 4.1 3.4 - 5.3 mmol/L    Chloride 104 98 - 107 mmol/L     Carbon Dioxide (CO2) 21 (L) 22 - 29 mmol/L    Anion Gap 13 7 - 15 mmol/L    Glucose 188 (H) 70 - 99 mg/dL    Urea Nitrogen 24.7 (H) 8.0 - 23.0 mg/dL    Creatinine 0.90 0.51 - 0.95 mg/dL    GFR Estimate 66 >60 mL/min/1.73m2    Calcium 7.7 (L) 8.8 - 10.2 mg/dL    Albumin 2.7 (L) 3.5 - 5.2 g/dL    Phosphorus 4.2 2.5 - 4.5 mg/dL   Magnesium CRRT   Result Value Ref Range    Magnesium 2.2 1.7 - 2.3 mg/dL   CBC with platelets CRRT   Result Value Ref Range    WBC Count 31.9 (H) 4.0 - 11.0 10e3/uL    RBC Count 2.42 (L) 3.80 - 5.20 10e6/uL    Hemoglobin 7.7 (L) 11.7 - 15.7 g/dL    Hematocrit 23.5 (L) 35.0 - 47.0 %    MCV 97 78 - 100 fL    MCH 31.8 26.5 - 33.0 pg    MCHC 32.8 31.5 - 36.5 g/dL    RDW 20.0 (H) 10.0 - 15.0 %    Platelet Count 20 (LL) 150 - 450 10e3/uL   Calcium Ionized CRRT   Result Value Ref Range    Calcium Ionized 4.3 (L) 4.4 - 5.2 mg/dL   Comprehensive metabolic panel   Result Value Ref Range    Sodium 138 136 - 145 mmol/L    Potassium 4.1 3.4 - 5.3 mmol/L    Chloride 104 98 - 107 mmol/L    Carbon Dioxide (CO2) 21 (L) 22 - 29 mmol/L    Anion Gap 13 7 - 15 mmol/L    Urea Nitrogen 24.7 (H) 8.0 - 23.0 mg/dL    Creatinine 0.90 0.51 - 0.95 mg/dL    Calcium 7.7 (L) 8.8 - 10.2 mg/dL    Glucose 188 (H) 70 - 99 mg/dL    Alkaline Phosphatase 177 (H) 35 - 104 U/L    AST 62 (H) 10 - 35 U/L    ALT 46 (H) 10 - 35 U/L    Protein Total 4.4 (L) 6.4 - 8.3 g/dL    Albumin 2.7 (L) 3.5 - 5.2 g/dL    Bilirubin Total 11.9 (H) <=1.2 mg/dL    GFR Estimate 66 >60 mL/min/1.73m2   INR   Result Value Ref Range    INR 2.51 (H) 0.85 - 1.15   Partial thromboplastin time   Result Value Ref Range    aPTT 43 (H) 22 - 38 Seconds   Fibrinogen activity   Result Value Ref Range    Fibrinogen Activity 98 (LL) 170 - 490 mg/dL   Prepare pheresed platelets (unit)   Result Value Ref Range    Blood Component Type Platelets     Product Code B1601N86     Unit Status Transfused     Unit Number [id number]     CODING SYSTEM KFRL833     ISSUE DATE AND TIME  [id number]     UNIT ABO/RH A+     UNIT TYPE ISBT 6200    Prepare cryoprecipitate (5-pack units)   Result Value Ref Range    Blood Component Type Cryoprecipitate     Product Code U0772H57     Unit Status Transfused     Unit Number [id number]     CODING SYSTEM [id number]     ISSUE DATE AND TIME [id number]     UNIT ABO/RH A+     UNIT TYPE ISBT 6200    Glucose by meter   Result Value Ref Range    GLUCOSE BY METER POCT 125 (H) 70 - 99 mg/dL   XR Chest Port 1 View    Narrative    CHEST ONE VIEW PORTABLE May 30, 2023 9:59 AM     HISTORY: Intubation.    COMPARISON: 5/23/2023.      Impression    IMPRESSION: Endotracheal tube, tip 4.2 cm above the evelio. An enteric  tube, tip not seen, but below the diaphragm. Right IJ central venous  catheter, tip near the SVC/RA junction. Bibasilar opacities, greater  on the left, have increased slightly since the previous exam. A small  left pleural effusion is also increased. No pneumothorax.   Renal panel CRRT   Result Value Ref Range    Sodium 138 136 - 145 mmol/L    Potassium 4.0 3.4 - 5.3 mmol/L    Chloride 103 98 - 107 mmol/L    Carbon Dioxide (CO2) 21 (L) 22 - 29 mmol/L    Anion Gap 14 7 - 15 mmol/L    Glucose 181 (H) 70 - 99 mg/dL    Urea Nitrogen 27.1 (H) 8.0 - 23.0 mg/dL    Creatinine 0.95 0.51 - 0.95 mg/dL    GFR Estimate 62 >60 mL/min/1.73m2    Calcium 8.1 (L) 8.8 - 10.2 mg/dL    Albumin 2.8 (L) 3.5 - 5.2 g/dL    Phosphorus 4.1 2.5 - 4.5 mg/dL   Magnesium CRRT   Result Value Ref Range    Magnesium 2.2 1.7 - 2.3 mg/dL   CBC with platelets CRRT   Result Value Ref Range    WBC Count 36.4 (H) 4.0 - 11.0 10e3/uL    RBC Count 2.36 (L) 3.80 - 5.20 10e6/uL    Hemoglobin 7.6 (L) 11.7 - 15.7 g/dL    Hematocrit 23.3 (L) 35.0 - 47.0 %    MCV 99 78 - 100 fL    MCH 32.2 26.5 - 33.0 pg    MCHC 32.6 31.5 - 36.5 g/dL    RDW 20.7 (H) 10.0 - 15.0 %    Platelet Count 39 (LL) 150 - 450 10e3/uL   Calcium Ionized CRRT   Result Value Ref Range    Calcium Ionized 4.3 (L) 4.4 - 5.2 mg/dL    Glucose by meter   Result Value Ref Range    GLUCOSE BY METER POCT 162 (H) 70 - 99 mg/dL   *      -----------------------------------------------------------------------------------------------------------------          ====================================================  TT: I have personally spent a total of 40 minutes on the date of the encounter,  on the unit in review of medical record, consultation with the medical providers and assessment of Teresa De Santiago  today, with more than 50% of this time spent in counseling, coordination of care, and discussion re: diagnostic results, prognosis, symptom management, risks and benefits of management options, and development of plan of care as noted above.      ====================================================           show

## 2024-11-25 NOTE — PLAN OF CARE
Appt has been scheduled for 11/26   ICU End of Shift Summary.  For vital signs and complete assessments, please see documentation flowsheets.      Pertinent assessments: Unresponsive, hypothermic. CPOT 0. Tele Afib, HR labile. MAPs <50 despite max dosing of pressors. LS coarse, coarse crackles to bases posteriorly. ETT secretions rodrick, nominal oral secretions. No UOP. CRRT discontinued. Rectal tube removed, blood tinged/mucous secretions. No measured output. Extensive weeping from CVC/HD and ART line sites L groin and from skin tears on R and L arms.    Major Shift Events: CRRT discontinued at  0932. Per discussion with MD and PharmD, Levophed dosing reduced to standard dosing range. Persistent MAPs down in 30s in 0900 hour, never improved. HR 0 @ 1530.  Donor Referral line contacted  @ 0768 and with time of death. Referral #[id number].      Plan (Upcoming Events): Family to contact ANS with  home information  Discharge/Transfer Needs: TBD     Bedside Shift Report Completed : Y  Bedside Safety Check Completed: Y    Goal Outcome Evaluation:      Plan of Care Reviewed With: spouse, patient, child    Overall Patient Progress: no changeOverall Patient Progress: no change
